# Patient Record
Sex: MALE | Race: WHITE | Employment: OTHER | URBAN - METROPOLITAN AREA
[De-identification: names, ages, dates, MRNs, and addresses within clinical notes are randomized per-mention and may not be internally consistent; named-entity substitution may affect disease eponyms.]

---

## 2018-03-08 ENCOUNTER — HOSPITAL ENCOUNTER (EMERGENCY)
Facility: CLINIC | Age: 73
Discharge: HOME | End: 2018-03-08
Attending: EMERGENCY MEDICINE | Admitting: EMERGENCY MEDICINE
Payer: MEDICARE

## 2018-03-08 ENCOUNTER — APPOINTMENT (OUTPATIENT)
Dept: CT IMAGING | Facility: CLINIC | Age: 73
End: 2018-03-08
Attending: EMERGENCY MEDICINE
Payer: MEDICARE

## 2018-03-08 VITALS
RESPIRATION RATE: 16 BRPM | SYSTOLIC BLOOD PRESSURE: 138 MMHG | OXYGEN SATURATION: 92 % | TEMPERATURE: 98.2 F | WEIGHT: 194 LBS | HEART RATE: 91 BPM | BODY MASS INDEX: 27.77 KG/M2 | DIASTOLIC BLOOD PRESSURE: 88 MMHG | HEIGHT: 70 IN

## 2018-03-08 DIAGNOSIS — S09.90XA CLOSED HEAD INJURY, INITIAL ENCOUNTER: ICD-10-CM

## 2018-03-08 DIAGNOSIS — S01.81XA FACIAL LACERATION, INITIAL ENCOUNTER: ICD-10-CM

## 2018-03-08 PROCEDURE — 99283 EMERGENCY DEPT VISIT LOW MDM: CPT | Mod: 25

## 2018-03-08 PROCEDURE — 70450 CT HEAD/BRAIN W/O DYE: CPT

## 2018-03-08 PROCEDURE — 25000132 ZZH RX MED GY IP 250 OP 250 PS 637

## 2018-03-08 PROCEDURE — 12013 RPR F/E/E/N/L/M 2.6-5.0 CM: CPT

## 2018-03-08 RX ORDER — PANTOPRAZOLE SODIUM 40 MG/1
40 TABLET, DELAYED RELEASE ORAL EVERY EVENING
COMMUNITY
End: 2020-12-03

## 2018-03-08 RX ORDER — ACETAMINOPHEN 325 MG/1
975 TABLET ORAL ONCE
Status: COMPLETED | OUTPATIENT
Start: 2018-03-08 | End: 2018-03-08

## 2018-03-08 RX ORDER — ACETAMINOPHEN 325 MG/1
TABLET ORAL
Status: COMPLETED
Start: 2018-03-08 | End: 2018-03-08

## 2018-03-08 RX ORDER — SIMVASTATIN 80 MG
80 TABLET ORAL EVERY EVENING
COMMUNITY
End: 2021-04-12

## 2018-03-08 RX ORDER — GINSENG 100 MG
CAPSULE ORAL
Status: DISCONTINUED
Start: 2018-03-08 | End: 2018-03-08 | Stop reason: HOSPADM

## 2018-03-08 RX ORDER — LIDOCAINE HYDROCHLORIDE 10 MG/ML
INJECTION, SOLUTION EPIDURAL; INFILTRATION; INTRACAUDAL; PERINEURAL
Status: DISCONTINUED
Start: 2018-03-08 | End: 2018-03-08 | Stop reason: HOSPADM

## 2018-03-08 RX ADMIN — ACETAMINOPHEN 975 MG: 325 TABLET ORAL at 18:11

## 2018-03-08 RX ADMIN — ACETAMINOPHEN 975 MG: 325 TABLET, FILM COATED ORAL at 18:11

## 2018-03-08 NOTE — ED AVS SNAPSHOT
[facility] Emergency Department    [address]    [facility] [address]    Phone:  [phone]    Fax:  [phone]                                       Armen Phelps   MRN: [id number]    Department:  [facility] Emergency Department   Date of Visit:  3/8/2018           Patient Information     Date Of Birth          1945        Your diagnoses for this visit were:     Closed head injury, initial encounter     Facial laceration, initial encounter        You were seen by Armen Lai MD.      Follow-up Information     Follow up with [facility].    Specialties:  Sports Medicine, Pain Management, Obstetrics & Gynecology, Pediatrics, Internal Medicine, Nephrology    Why:  in 5 days for suture removal    Contact information:    [address] [place] [address]  [facility] [address] [phone]        Discharge Instructions       Discharge Instructions  Head Injury    You have been seen today for a head injury. Your evaluation included a history and physical examination. You may have had a CT (CAT) scan performed, though most head injuries do not require a scan. Based on this evaluation, your provider today does not feel that your head injury is serious.    Generally, every Emergency Department visit should have a follow-up clinic visit with either a primary or a specialty clinic/provider. Please follow-up as instructed by your emergency provider today.  Return to the Emergency Department if:    You are confused or you are not acting right.    Your headache gets worse or you start to have a really bad headache even with your recommended treatment plan.    You vomit (throw up) more than once.    You have a seizure.    You have trouble walking.    You have weakness or paralysis (cannot move) in an arm or a leg.    You have blood or fluid coming from your ears or nose.    You have new symptoms or anything that worries you.    Sleeping:   It is okay for you to sleep, but someone should wake you up if instructed by your provider, and someone should check on you at your usual time to wake up.     Activity:    Do not drive for at least 24 hours.    Do not drive if you have dizzy spells or trouble concentrating, or remembering things.    Do not return to any contact sports until cleared by your regular provider.     MORE INFORMATION:    Concussion:  A concussion is a minor head injury that may cause temporary problems with the way the brain works. Although concussions are important, they are generally not an emergency or a reason that a person needs to be hospitalized. Some concussion symptoms include confusion, amnesia (forgetful), nausea (sick to your stomach) and vomiting (throwing up), dizziness, fatigue, memory or concentration problems, irritability and sleep problems. For most people, concussions are mild and temporary but some will have more severe and persistent symptoms that require on-going care and treatment.  CT Scans: Your evaluation today may have included a CT scan (CAT scan) to look for things like bleeding or a skull fracture (broken bone).  CT scans involve radiation and too many CT scans can cause serious health problems like cancer, especially in children.  Because of this, your provider may not have ordered a CT scan today if they think you are at low risk for a serious or life threatening problem.    If you were given a prescription for medicine here today, be sure to read all of the information (including the package insert) that comes with your prescription.  This will include important information about the medicine, its side effects, and any warnings that you need to know about.  The pharmacist who fills the prescription can provide more information and answer questions you may have about the medicine.  If you have questions or concerns that the pharmacist cannot address, please call or return to the Emergency Department.      Remember that you can always come back to the Emergency Department if you are not able to see your regular provider in the amount of time listed above, if you get any new symptoms, or if there is anything that worries you.      Discharge Instructions  Laceration (Cut)    You were seen today for a laceration (cut).  Your provider examined your laceration for any problems such a buried foreign body (like glass, a splinter, or gravel), or injury to blood vessels, tendons, and nerves.  Your provider may have also rinsed and/or scrubbed your laceration to help prevent an infection. It may not be possible to find all problems with your laceration on the first visit; occasionally foreign bodies or a tendon injury can go undetected.    Your laceration may have been closed in one of several ways:    No closure: many wounds will heal just fine without closure.    Stitches: regular stitches that require removal.    Staples: skin staples are often used in the scalp/head.    Wound adhesive (glue): skin glue can be used for certain lacerations and doesn t require removal.    Wound strips (aka Butterfly bandages or steri-strips): these are bandages that help to close a wound.    Absorbable stitches:  dissolving  stitches that go away on their own and usually don t require removal.    A small percentage of wounds will develop an infection regardless of how well the wound is cared for. Antibiotics are generally not indicated to prevent an infection so are only given for a small number of high-risk wounds. Some lacerations are too high risk to close, and are left open to heal because closure can increase the likelihood that an infection will develop.    Remember that all lacerations, no matter how expertly repaired, will cause scarring. We consider many factors, techniques, and materials, in our efforts to provide the best possible cosmetic outcome.    Generally, every Emergency Department visit should have a follow-up clinic  visit with either a primary or a specialty clinic/provider. Please follow-up as instructed by your emergency provider today.     Return to the Emergency Department right away if:    You have more redness, swelling, pain, drainage (pus), a bad smell, or red streaking from your laceration as these symptoms could indicate an infection.    You have a fever of 100.4 F or more.    You have bleeding that you cannot stop at home. If your cut starts to bleed, hold pressure on the bleeding area with a clean cloth or put pressure over the bandage.  If the bleeding does not stop after using constant pressure for 30 minutes, you should return to the Emergency Department for further treatment.    An area past the laceration is cool, pale, or blue compared with the other side, or has a slower return of color when squeezed.    Your dressing seems too tight or starts to get uncomfortable or painful. For children, signs of a problem might be irritability or restlessness.    You have loss of normal function or use of an area, such as being unable to straighten or bend a finger normally.    You have a numb area past the laceration.    Return to the Emergency Department or see your regular provider if:    The laceration starts to come open.     You have something coming out of the cut or a feeling that there is something in the laceration.    Your wound will not heal, or keeps breaking open. There can always be glass, wood, dirt or other things in any wound.  They will not always show up, even on x-rays.  If a wound does not heal, this may be why, and it is important to follow-up with your regular provider.    Home Care:    Take your dressing off in 12-24 hours, or as instructed by your provider, to check your laceration. Remove the dressing sooner if it seems too tight or painful, or if it is getting numb, tingly, or pale past the dressing.    Gently wash your laceration 1-2 times daily with clean water and mild soap. It is okay to  shower or run clean water over the laceration, but do not let the laceration soak in water (no swimming).    If your laceration was closed with wound adhesive or strips: pat it dry and leave it open to the air. For all other repairs: after you wash your laceration, or at least 2 times a day, apply antibiotic ointment (such as Neosporin  or Bacitracin ) to the laceration, then cover it with a Band-Aid  or gauze.    Keep the laceration clean. Wear gloves or other protective clothing if you are around dirt.    Follow-up for removal:    If your wound was closed with staples or regular stitches, they need to be removed according to the instructions and timeline specified by your provider today.    If your wound was closed with absorbable ( dissolving ) sutures, they should fall out, dissolve, or not be visible in about one week. If they are still visible, then they should be removed according to the instructions and timeline specified by your provider today.    Scars:  To help minimize scarring:    Wear sunscreen over the healed laceration when out in the sun.    Massage the area regularly once healed.    You may apply Vitamin E to the healed wound.    Wait. Scars improve in appearance over months and years.    If you were given a prescription for medicine here today, be sure to read all of the information (including the package insert) that comes with your prescription.  This will include important information about the medicine, its side effects, and any warnings that you need to know about.  The pharmacist who fills the prescription can provide more information and answer questions you may have about the medicine.  If you have questions or concerns that the pharmacist cannot address, please call or return to the Emergency Department.       Remember that you can always come back to the Emergency Department if you are not able to see your regular provider in the amount of time listed above, if you get any new symptoms,  or if there is anything that worries you.      24 Hour Appointment Hotline       To make an appointment at any [facility], call [phone] ([phone]). If you don't have a family doctor or clinic, we will help you find one. [place] clinics are conveniently located to serve the needs of you and your family.             Review of your medicines      Our records show that you are taking the medicines listed below. If these are incorrect, please call your family doctor or clinic.        Dose / Directions Last dose taken    ASPIRIN ADULT LOW STRENGTH PO   Dose:  81 mg        Take 81 mg by mouth   Refills:  0        LISINOPRIL PO   Dose:  20 mg        Take 20 mg by mouth   Refills:  0        PROTONIX PO   Dose:  40 mg        Take 40 mg by mouth   Refills:  0        SIMVASTATIN PO        Refills:  0                Procedures and tests performed during your visit     CT Head w/o Contrast      Orders Needing Specimen Collection     None      Pending Results     No orders found from 3/6/2018 to 3/9/2018.            Pending Culture Results     No orders found from 3/6/2018 to 3/9/2018.            Pending Results Instructions     If you had any lab results that were not finalized at the time of your Discharge, you can call the ED Lab Result RN at [phone]. You will be contacted by this team for any positive Lab results or changes in treatment. The nurses are available 7 days a week from 10A to 6:30P.  You can leave a message 24 hours per day and they will return your call.        Test Results From Your Hospital Stay        3/8/2018  6:48 PM      Narrative     CT SCAN OF THE HEAD WITHOUT CONTRAST   3/8/2018 6:11 PM     HISTORY: Closed head injury.    TECHNIQUE: Axial images of the head and coronal reformations without  IV contrast material. Radiation dose for this scan was reduced using  automated exposure control, adjustment of the mA and/or kV according  to patient size, or iterative reconstruction  technique.    COMPARISON: None.    FINDINGS: There is no evidence of intracranial hemorrhage, mass, acute  infarct or anomaly. The ventricles are normal in size, shape and  configuration. Mild diffuse parenchymal volume loss.    The visualized portions of the sinuses and mastoids appear normal.  Left supraorbital soft tissue injury without underlying fracture.        Impression     IMPRESSION:     1. No evidence of acute intracranial hemorrhage, mass, or herniation.  2. Left supraorbital soft tissue injury without underlying fracture.      KIMANI AMIN MD                Clinical Quality Measure: Blood Pressure Screening     Your blood pressure was checked while you were in the emergency department today. The last reading we obtained was  BP: 138/88 . Please read the guidelines below about what these numbers mean and what you should do about them.  If your systolic blood pressure (the top number) is less than 120 and your diastolic blood pressure (the bottom number) is less than 80, then your blood pressure is normal. There is nothing more that you need to do about it.  If your systolic blood pressure (the top number) is 120-139 or your diastolic blood pressure (the bottom number) is 80-89, your blood pressure may be higher than it should be. You should have your blood pressure rechecked within a year by a primary care provider.  If your systolic blood pressure (the top number) is 140 or greater or your diastolic blood pressure (the bottom number) is 90 or greater, you may have high blood pressure. High blood pressure is treatable, but if left untreated over time it can put you at risk for heart attack, stroke, or kidney failure. You should have your blood pressure rechecked by a primary care provider within the next 4 weeks.  If your provider in the emergency department today gave you specific instructions to follow-up with your doctor or provider even sooner than that, you should follow that instruction and not  "wait for up to 4 weeks for your follow-up visit.        Thank you for choosing [facility]       Thank you for choosing [facility] for your care. Our goal is always to provide you with excellent care. Hearing back from our patients is one way we can continue to improve our services. Please take a few minutes to complete the written survey that you may receive in the mail after you visit with us. Thank you!        [employer] Information     [employer] lets you send messages to your doctor, view your test results, renew your prescriptions, schedule appointments and more. To sign up, go to [email or web address] . Click on \"Log in\" on the left side of the screen, which will take you to the Welcome page. Then click on \"Sign up Now\" on the right side of the page.     You will be asked to enter the access code listed below, as well as some personal information. Please follow the directions to create your username and password.     Your access code is: [email or web address]  Expires: 2018  7:34 PM     Your access code will  in 90 days. If you need help or a new code, please call your [facility] clinic or [phone].        Care EveryWhere ID     This is your Care EveryWhere ID. This could be used by other organizations to access your [facility] medical records  [id number]        Equal Access to Services     ROXANNA JAMES AH: Martinez minaya Sosilvino, waaxda luqadaha, qaybta kaalmada adeluda, cory buchanan. So [facility] [phone].    ATENCIÓN: Si habla español, tiene a guevara disposición servicios gratuitos de asistencia lingüística. Llame al [phone].    We comply with applicable federal civil rights laws and [place] laws. We do not discriminate on the basis of race, color, national origin, age, disability, sex, sexual orientation, or gender identity.            After Visit Summary       This is your record. Keep this with you and show to your community pharmacist(s) and doctor(s) at your next visit. "

## 2018-03-08 NOTE — ED PROVIDER NOTES
"  History     Chief Complaint:  Fall and head laceration    HPI   Armen Phelps is a 73 year old male who presents following a fall, with a head laceration. He notes he was walking his dog earlier today and tripped, falling forward. Because he was holding his dogs' leashes, he did not catch him self with both hands, and struck his forehead and nose to the ground. He denies LOC, nausea/vomiting, dizziness, or any other concerns.    Allergies:  No Known Drug Allergies      Medications:    Simvastatin  Lisinopril  Protonix    Past Medical History:    Sciatica  Thoracic or lumbosacral neuritis or radiculitis  Hypertension     Past Surgical History:    Back surgery  Cholecystectomy     Family History:    The patient denies any relevant family medical history.     Social History:  Smoking Status: Former  Alcohol Use: Yes   Marital Status:   [2]    Review of Systems  Neuro: + as per above.  All other systems reviewed and negative      Physical Exam   Vitals:  Patient Vitals for the past 24 hrs:   BP Temp Temp src Pulse Heart Rate Resp SpO2 Height Weight   03/08/18 1745 - - - - - - 92 % - -   03/08/18 1730 138/88 - - 91 91 - 93 % - -   03/08/18 1708 (!) 132/91 98.2  F (36.8  C) Oral - 98 16 97 % 1.778 m (5' 10\") 88 kg (194 lb)        Physical Exam  Constitutional: Alert, attentive, GCS 15  HENT:     Nose: Nose notable for abrasions to the bridge and left nare   Mouth/Throat: Oropharynx is clear, mucous membranes are moist   Ears: Normal external ears. TMs clear bilaterally, normal external canals    bilaterally.  Eyes: EOM are normal. Pupils are equal, round, and reactive to light.   CV: Regular rate and rhythm, no murmurs, rubs or gallups.  Chest: Effort normal and breath sounds normal.   GI: No distension. There is no tenderness.  MSK: Normal range of motion.    C-spine cleared by NEXUS   No tenderness or stepoffs to the C/T/L-spine   Normal, atraumatic inspection to the back  Neurological: Alert, attentive  Skin: " Skin is warm and dry.   Laceration to the left forehead just above the eyebrow, with surrounding abrasions    Emergency Department Course     Imaging:  Radiology findings were communicated with the patient who voiced understanding of the findings.  CT head w/o contrast:  IMPRESSION:     1. No evidence of acute intracranial hemorrhage, mass, or herniation.  2. Left supraorbital soft tissue injury without underlying fracture.  Reading per radiology.     Procedures:  Laceration repaired by ANDRZEJ Becerril with excellent approximation.    Interventions:  1811 Tylenol 975 mg oral    Emergency Department Course:  Nursing notes and vitals reviewed.  I performed an exam of the patient as documented above.   The patient was sent for a CT while in the emergency department, results above.      I discussed the treatment plan with the patient. They expressed understanding of this plan and consented to discharge. They will be discharged home with instructions for care and follow up. In addition, the patient will return to the emergency department if their symptoms persist, worsen, if new symptoms arise or if there is any concern.  All questions were answered.     I personally reviewed the laboratory results with the patient and answered all related questions prior to discharge.    Impression & Plan      Medical Decision Making:  This is a very pleasant 73 year old male who presents with closed head injury and facial laceration after a mechanical fall. Given advanced age, CT was performed to rule out intracranial injury and is negative. C-spine is cleared by NEXUS. He is UTD tetanus. Laceration repair was performed with excellent approximation, plan SR in 5 days. Discussed his CHI and that he would need to return for any worsening headache, vomiting, confusion, etc., as these could indicate delayed bleed.      Diagnosis:    ICD-10-CM    1. Closed head injury, initial encounter S09.90XA    2. Facial laceration, initial encounter  S01.81XA         Disposition:   Discharged    CMS Diagnoses: None     Scribe Disclosure:  I, Cam Horta, am serving as a scribe at 5:16 PM on 3/8/2018 to document services personally performed by Armen Lai MD, based on my observations and the provider's statements to me.   [facility] EMERGENCY DEPARTMENT       Armen Lai MD  03/08/18 7600

## 2018-03-08 NOTE — ED AVS SNAPSHOT
[facility] Emergency Department    [address]    [facility] [address]    Phone:  [phone]    Fax:  [phone]                                       Armen Phelps   MRN: [id number]    Department:  [facility] Emergency Department   Date of Visit:  3/8/2018           After Visit Summary Signature Page     I have received my discharge instructions, and my questions have been answered. I have discussed any challenges I see with this plan with the nurse or doctor.    ..........................................................................................................................................  Patient/Patient Representative Signature      ..........................................................................................................................................  Patient Representative Print Name and Relationship to Patient    ..................................................               ................................................  Date                                            Time    ..........................................................................................................................................  Reviewed by Signature/Title    ...................................................              ..............................................  Date                                                            Time

## 2018-03-08 NOTE — ED NOTES
Pt was walking his dogs and fell on ice.  He struck forehead onto pavement.  He thinks he did not lose consciousness.  He has laceration to left forehead and abrasions to face and nose.

## 2018-03-09 NOTE — DISCHARGE INSTRUCTIONS
Discharge Instructions  Head Injury    You have been seen today for a head injury. Your evaluation included a history and physical examination. You may have had a CT (CAT) scan performed, though most head injuries do not require a scan. Based on this evaluation, your provider today does not feel that your head injury is serious.    Generally, every Emergency Department visit should have a follow-up clinic visit with either a primary or a specialty clinic/provider. Please follow-up as instructed by your emergency provider today.  Return to the Emergency Department if:    You are confused or you are not acting right.    Your headache gets worse or you start to have a really bad headache even with your recommended treatment plan.    You vomit (throw up) more than once.    You have a seizure.    You have trouble walking.    You have weakness or paralysis (cannot move) in an arm or a leg.    You have blood or fluid coming from your ears or nose.    You have new symptoms or anything that worries you.    Sleeping:  It is okay for you to sleep, but someone should wake you up if instructed by your provider, and someone should check on you at your usual time to wake up.     Activity:    Do not drive for at least 24 hours.    Do not drive if you have dizzy spells or trouble concentrating, or remembering things.    Do not return to any contact sports until cleared by your regular provider.     MORE INFORMATION:    Concussion:  A concussion is a minor head injury that may cause temporary problems with the way the brain works. Although concussions are important, they are generally not an emergency or a reason that a person needs to be hospitalized. Some concussion symptoms include confusion, amnesia (forgetful), nausea (sick to your stomach) and vomiting (throwing up), dizziness, fatigue, memory or concentration problems, irritability and sleep problems. For most people, concussions are mild and temporary but some will have more  severe and persistent symptoms that require on-going care and treatment.  CT Scans: Your evaluation today may have included a CT scan (CAT scan) to look for things like bleeding or a skull fracture (broken bone).  CT scans involve radiation and too many CT scans can cause serious health problems like cancer, especially in children.  Because of this, your provider may not have ordered a CT scan today if they think you are at low risk for a serious or life threatening problem.    If you were given a prescription for medicine here today, be sure to read all of the information (including the package insert) that comes with your prescription.  This will include important information about the medicine, its side effects, and any warnings that you need to know about.  The pharmacist who fills the prescription can provide more information and answer questions you may have about the medicine.  If you have questions or concerns that the pharmacist cannot address, please call or return to the Emergency Department.     Remember that you can always come back to the Emergency Department if you are not able to see your regular provider in the amount of time listed above, if you get any new symptoms, or if there is anything that worries you.      Discharge Instructions  Laceration (Cut)    You were seen today for a laceration (cut).  Your provider examined your laceration for any problems such a buried foreign body (like glass, a splinter, or gravel), or injury to blood vessels, tendons, and nerves.  Your provider may have also rinsed and/or scrubbed your laceration to help prevent an infection. It may not be possible to find all problems with your laceration on the first visit; occasionally foreign bodies or a tendon injury can go undetected.    Your laceration may have been closed in one of several ways:    No closure: many wounds will heal just fine without closure.    Stitches: regular stitches that require removal.    Staples:  skin staples are often used in the scalp/head.    Wound adhesive (glue): skin glue can be used for certain lacerations and doesn t require removal.    Wound strips (aka Butterfly bandages or steri-strips): these are bandages that help to close a wound.    Absorbable stitches:  dissolving  stitches that go away on their own and usually don t require removal.    A small percentage of wounds will develop an infection regardless of how well the wound is cared for. Antibiotics are generally not indicated to prevent an infection so are only given for a small number of high-risk wounds. Some lacerations are too high risk to close, and are left open to heal because closure can increase the likelihood that an infection will develop.    Remember that all lacerations, no matter how expertly repaired, will cause scarring. We consider many factors, techniques, and materials, in our efforts to provide the best possible cosmetic outcome.    Generally, every Emergency Department visit should have a follow-up clinic visit with either a primary or a specialty clinic/provider. Please follow-up as instructed by your emergency provider today.     Return to the Emergency Department right away if:    You have more redness, swelling, pain, drainage (pus), a bad smell, or red streaking from your laceration as these symptoms could indicate an infection.    You have a fever of 100.4 F or more.    You have bleeding that you cannot stop at home. If your cut starts to bleed, hold pressure on the bleeding area with a clean cloth or put pressure over the bandage.  If the bleeding does not stop after using constant pressure for 30 minutes, you should return to the Emergency Department for further treatment.    An area past the laceration is cool, pale, or blue compared with the other side, or has a slower return of color when squeezed.    Your dressing seems too tight or starts to get uncomfortable or painful. For children, signs of a problem might  be irritability or restlessness.    You have loss of normal function or use of an area, such as being unable to straighten or bend a finger normally.    You have a numb area past the laceration.    Return to the Emergency Department or see your regular provider if:    The laceration starts to come open.     You have something coming out of the cut or a feeling that there is something in the laceration.    Your wound will not heal, or keeps breaking open. There can always be glass, wood, dirt or other things in any wound.  They will not always show up, even on x-rays.  If a wound does not heal, this may be why, and it is important to follow-up with your regular provider.    Home Care:    Take your dressing off in 12-24 hours, or as instructed by your provider, to check your laceration. Remove the dressing sooner if it seems too tight or painful, or if it is getting numb, tingly, or pale past the dressing.    Gently wash your laceration 1-2 times daily with clean water and mild soap. It is okay to shower or run clean water over the laceration, but do not let the laceration soak in water (no swimming).    If your laceration was closed with wound adhesive or strips: pat it dry and leave it open to the air. For all other repairs: after you wash your laceration, or at least 2 times a day, apply antibiotic ointment (such as Neosporin  or Bacitracin ) to the laceration, then cover it with a Band-Aid  or gauze.    Keep the laceration clean. Wear gloves or other protective clothing if you are around dirt.    Follow-up for removal:    If your wound was closed with staples or regular stitches, they need to be removed according to the instructions and timeline specified by your provider today.    If your wound was closed with absorbable ( dissolving ) sutures, they should fall out, dissolve, or not be visible in about one week. If they are still visible, then they should be removed according to the instructions and timeline  specified by your provider today.    Scars:  To help minimize scarring:    Wear sunscreen over the healed laceration when out in the sun.    Massage the area regularly once healed.    You may apply Vitamin E to the healed wound.    Wait. Scars improve in appearance over months and years.    If you were given a prescription for medicine here today, be sure to read all of the information (including the package insert) that comes with your prescription.  This will include important information about the medicine, its side effects, and any warnings that you need to know about.  The pharmacist who fills the prescription can provide more information and answer questions you may have about the medicine.  If you have questions or concerns that the pharmacist cannot address, please call or return to the Emergency Department.       Remember that you can always come back to the Emergency Department if you are not able to see your regular provider in the amount of time listed above, if you get any new symptoms, or if there is anything that worries you.

## 2018-03-09 NOTE — ED NOTES
Narrative: Procedure: Laceration Repair        LACERATION:  A simple minimally Contaminated 3.5 cm laceration with   surrounding abrasions.       LOCATION: Above left eyebrow      FUNCTION:  Distally sensation and circulation are intact.      ANESTHESIA:  Local using 5 mLs of 1% Lidocaine      PREPARATION:  Scrubbing with Normal Saline and Shur Clens      DEBRIDEMENT:  No debridement.       CLOSURE:  Wound was closed with One Layer. Skin closed with 7 x 5.0   Ethylon using interrupted sutures.    I performed the above laceration repair on the patient. Dr. Lai supervised the procedure.      Diana Becerril PA-C  03/09/18 [id number]

## 2018-10-20 ENCOUNTER — APPOINTMENT (OUTPATIENT)
Dept: CT IMAGING | Facility: CLINIC | Age: 73
End: 2018-10-20
Attending: EMERGENCY MEDICINE
Payer: MEDICARE

## 2018-10-20 ENCOUNTER — HOSPITAL ENCOUNTER (EMERGENCY)
Facility: CLINIC | Age: 73
Discharge: HOME | End: 2018-10-20
Attending: EMERGENCY MEDICINE | Admitting: EMERGENCY MEDICINE
Payer: MEDICARE

## 2018-10-20 VITALS
OXYGEN SATURATION: 96 % | HEART RATE: 98 BPM | SYSTOLIC BLOOD PRESSURE: 154 MMHG | WEIGHT: 185 LBS | RESPIRATION RATE: 20 BRPM | TEMPERATURE: 98 F | BODY MASS INDEX: 26.54 KG/M2 | DIASTOLIC BLOOD PRESSURE: 85 MMHG

## 2018-10-20 DIAGNOSIS — R11.2 NAUSEA AND VOMITING, INTRACTABILITY OF VOMITING NOT SPECIFIED, UNSPECIFIED VOMITING TYPE: ICD-10-CM

## 2018-10-20 LAB
ALBUMIN SERPL-MCNC: 4.4 G/DL (ref 3.4–5)
ALP SERPL-CCNC: 61 U/L (ref 40–150)
ALT SERPL W P-5'-P-CCNC: 27 U/L (ref 0–70)
ANION GAP SERPL CALCULATED.3IONS-SCNC: 14 MMOL/L (ref 3–14)
AST SERPL W P-5'-P-CCNC: 42 U/L (ref 0–45)
BASOPHILS # BLD AUTO: 0 10E9/L (ref 0–0.2)
BASOPHILS NFR BLD AUTO: 0.2 %
BILIRUB SERPL-MCNC: 0.9 MG/DL (ref 0.2–1.3)
BUN SERPL-MCNC: 17 MG/DL (ref 7–30)
CALCIUM SERPL-MCNC: 9 MG/DL (ref 8.5–10.1)
CHLORIDE SERPL-SCNC: 99 MMOL/L (ref 94–109)
CO2 SERPL-SCNC: 23 MMOL/L (ref 20–32)
CREAT BLD-MCNC: 0.9 MG/DL (ref 0.66–1.25)
CREAT SERPL-MCNC: 0.92 MG/DL (ref 0.66–1.25)
DIFFERENTIAL METHOD BLD: ABNORMAL
EOSINOPHIL # BLD AUTO: 0 10E9/L (ref 0–0.7)
EOSINOPHIL NFR BLD AUTO: 0 %
ERYTHROCYTE [DISTWIDTH] IN BLOOD BY AUTOMATED COUNT: 14.2 % (ref 10–15)
ETHANOL SERPL-MCNC: <0.01 G/DL
GFR SERPL CREATININE-BSD FRML MDRD: 81 ML/MIN/1.7M2
GFR SERPL CREATININE-BSD FRML MDRD: 83 ML/MIN/1.7M2
GLUCOSE SERPL-MCNC: 139 MG/DL (ref 70–99)
HCT VFR BLD AUTO: 35.1 % (ref 40–53)
HGB BLD-MCNC: 11.7 G/DL (ref 13.3–17.7)
IMM GRANULOCYTES # BLD: 0 10E9/L (ref 0–0.4)
IMM GRANULOCYTES NFR BLD: 0.5 %
LIPASE SERPL-CCNC: 135 U/L (ref 73–393)
LYMPHOCYTES # BLD AUTO: 0.2 10E9/L (ref 0.8–5.3)
LYMPHOCYTES NFR BLD AUTO: 5.1 %
MCH RBC QN AUTO: 35 PG (ref 26.5–33)
MCHC RBC AUTO-ENTMCNC: 33.3 G/DL (ref 31.5–36.5)
MCV RBC AUTO: 105 FL (ref 78–100)
MONOCYTES # BLD AUTO: 0.2 10E9/L (ref 0–1.3)
MONOCYTES NFR BLD AUTO: 4.6 %
NEUTROPHILS # BLD AUTO: 3.7 10E9/L (ref 1.6–8.3)
NEUTROPHILS NFR BLD AUTO: 89.6 %
NRBC # BLD AUTO: 0 10*3/UL
NRBC BLD AUTO-RTO: 0 /100
PLATELET # BLD AUTO: 140 10E9/L (ref 150–450)
POTASSIUM SERPL-SCNC: 3.4 MMOL/L (ref 3.4–5.3)
PROT SERPL-MCNC: 7.4 G/DL (ref 6.8–8.8)
RBC # BLD AUTO: 3.34 10E12/L (ref 4.4–5.9)
SODIUM SERPL-SCNC: 136 MMOL/L (ref 133–144)
TROPONIN I SERPL-MCNC: <0.015 UG/L (ref 0–0.04)
WBC # BLD AUTO: 4.1 10E9/L (ref 4–11)

## 2018-10-20 PROCEDURE — 83690 ASSAY OF LIPASE: CPT | Performed by: EMERGENCY MEDICINE

## 2018-10-20 PROCEDURE — 25000128 H RX IP 250 OP 636: Performed by: EMERGENCY MEDICINE

## 2018-10-20 PROCEDURE — 82565 ASSAY OF CREATININE: CPT

## 2018-10-20 PROCEDURE — 80053 COMPREHEN METABOLIC PANEL: CPT | Performed by: EMERGENCY MEDICINE

## 2018-10-20 PROCEDURE — 96374 THER/PROPH/DIAG INJ IV PUSH: CPT | Mod: 59

## 2018-10-20 PROCEDURE — 36415 COLL VENOUS BLD VENIPUNCTURE: CPT | Performed by: EMERGENCY MEDICINE

## 2018-10-20 PROCEDURE — 25000125 ZZHC RX 250: Performed by: EMERGENCY MEDICINE

## 2018-10-20 PROCEDURE — 74177 CT ABD & PELVIS W/CONTRAST: CPT

## 2018-10-20 PROCEDURE — 96375 TX/PRO/DX INJ NEW DRUG ADDON: CPT

## 2018-10-20 PROCEDURE — 85025 COMPLETE CBC W/AUTO DIFF WBC: CPT | Performed by: EMERGENCY MEDICINE

## 2018-10-20 PROCEDURE — 99285 EMERGENCY DEPT VISIT HI MDM: CPT | Mod: 25

## 2018-10-20 PROCEDURE — 80320 DRUG SCREEN QUANTALCOHOLS: CPT | Performed by: EMERGENCY MEDICINE

## 2018-10-20 PROCEDURE — 25000132 ZZH RX MED GY IP 250 OP 250 PS 637: Mod: GY | Performed by: EMERGENCY MEDICINE

## 2018-10-20 PROCEDURE — 96361 HYDRATE IV INFUSION ADD-ON: CPT

## 2018-10-20 PROCEDURE — 84484 ASSAY OF TROPONIN QUANT: CPT | Performed by: EMERGENCY MEDICINE

## 2018-10-20 PROCEDURE — A9270 NON-COVERED ITEM OR SERVICE: HCPCS | Mod: GY | Performed by: EMERGENCY MEDICINE

## 2018-10-20 RX ORDER — ONDANSETRON 2 MG/ML
4 INJECTION INTRAMUSCULAR; INTRAVENOUS ONCE
Status: COMPLETED | OUTPATIENT
Start: 2018-10-20 | End: 2018-10-20

## 2018-10-20 RX ORDER — PROMETHAZINE HYDROCHLORIDE 25 MG/1
25 SUPPOSITORY RECTAL EVERY 6 HOURS PRN
Qty: 20 SUPPOSITORY | Refills: 1 | Status: SHIPPED | OUTPATIENT
Start: 2018-10-20 | End: 2019-07-10

## 2018-10-20 RX ORDER — DIPHENHYDRAMINE HYDROCHLORIDE 50 MG/ML
25 INJECTION INTRAMUSCULAR; INTRAVENOUS
Status: COMPLETED | OUTPATIENT
Start: 2018-10-20 | End: 2018-10-20

## 2018-10-20 RX ORDER — METOCLOPRAMIDE HYDROCHLORIDE 5 MG/ML
5 INJECTION INTRAMUSCULAR; INTRAVENOUS
Status: COMPLETED | OUTPATIENT
Start: 2018-10-20 | End: 2018-10-20

## 2018-10-20 RX ORDER — IOPAMIDOL 755 MG/ML
500 INJECTION, SOLUTION INTRAVASCULAR ONCE
Status: COMPLETED | OUTPATIENT
Start: 2018-10-20 | End: 2018-10-20

## 2018-10-20 RX ORDER — PROMETHAZINE HYDROCHLORIDE 25 MG/1
25 TABLET ORAL EVERY 6 HOURS PRN
Qty: 15 TABLET | Refills: 0 | Status: SHIPPED | OUTPATIENT
Start: 2018-10-20 | End: 2019-07-10

## 2018-10-20 RX ADMIN — METOCLOPRAMIDE 5 MG: 5 INJECTION, SOLUTION INTRAMUSCULAR; INTRAVENOUS at 16:08

## 2018-10-20 RX ADMIN — DIPHENHYDRAMINE HYDROCHLORIDE 25 MG: 50 INJECTION, SOLUTION INTRAMUSCULAR; INTRAVENOUS at 16:08

## 2018-10-20 RX ADMIN — LIDOCAINE HYDROCHLORIDE 30 ML: 20 SOLUTION ORAL; TOPICAL at 16:56

## 2018-10-20 RX ADMIN — SODIUM CHLORIDE, POTASSIUM CHLORIDE, SODIUM LACTATE AND CALCIUM CHLORIDE 1000 ML: 600; 310; 30; 20 INJECTION, SOLUTION INTRAVENOUS at 16:07

## 2018-10-20 RX ADMIN — SODIUM CHLORIDE 63 ML: 9 INJECTION, SOLUTION INTRAVENOUS at 16:36

## 2018-10-20 RX ADMIN — ONDANSETRON 4 MG: 2 INJECTION INTRAMUSCULAR; INTRAVENOUS at 16:08

## 2018-10-20 RX ADMIN — IOPAMIDOL 93 ML: 755 INJECTION, SOLUTION INTRAVENOUS at 16:36

## 2018-10-20 ASSESSMENT — ENCOUNTER SYMPTOMS
TREMORS: 1
TROUBLE SWALLOWING: 0
DYSURIA: 0
VOMITING: 1
HEADACHES: 1
DIARRHEA: 0
SPEECH DIFFICULTY: 0
NAUSEA: 1
SHORTNESS OF BREATH: 0

## 2018-10-20 NOTE — ED PROVIDER NOTES
Brief visit to initiate the workup near shift change    73-year-old gentleman who has had acute onset of nausea nonbilious nonbloody emesis once a month for the last several months going back to March (7 months).  No recent travel antibiotics or known sick contacts.  Woke up today just not feeling right and then developed nausea multiple episodes of emesis again nonbilious and nonbloody.  He does not have diarrhea dysuria.  Abdominal cramping with the vomiting otherwise has just a feeling of distention.  No fever no intra-abdominal surgery.  No known heart or lung problems other than hypertension.  Was seen by Dr. Barreto blood work was done and was unremarkable and told he had acid reflux.    Ordered: ECG, CBC, CMP, lipase, CT scan abdomen pelvis, IV fluids, antiemetics    Concerns here would be for a abdominal anatomical/functional etiology versus referred cardiac etiology unlikely a referred CNS pathology given the history and examination.    Ronald Vázquez MD  [facility]  [facility]     Ronald Vázquez MD  10/20/18 [id number]

## 2018-10-20 NOTE — ED TRIAGE NOTES
Pt states intermittent vomiting for over 3 months, states has been seen by PCP for same without definitive diagnosis or relief. States had episode of palpitations immediately after vomiting, denies palpitations currently. States has not had any imaging done for vomiting and has not seen a specialist for same. ABCs intact, GCS 15

## 2018-10-20 NOTE — ED PROVIDER NOTES
History     Chief Complaint:  Vomiting    HPI   Armen Phelps is a 73 year old male status post cholecystectomy who presents with vomiting. The patient reports to having monthly day long emesis episodes since March where he feels nauseas before onset.  For these, after onset he has had symptoms of tremors and headaches, and by spouse nighttime confusion. He has presented to his family physician at [facility] and was told these symptoms could be attributed to acid reflex. He further reports that today he woke up at 0600 hours with vomiting, had breakfast and vomited that up shortly after, and then vomited three more times with unusually violent tremors, prompting his presentation by spouse.  Upon presentation, he states that he has no headaches and no pain.  He denies chest pain, shortness of breath, diarrhea, black stool, and hematemesis, as well as a history of heart attack and angina. He also denies difficulty walking and talking or swallowing, as well as double vision, vertigo, and dysuria. He further denies coffee or nicotine intake. Per spouse account, he endorsed drinking almost a bottle of wine per day and last intake was last night. Of note, he mentioned that he took Zofran just prior to presentation.     Allergies:  No known drug allergies    Medications:    ASPIRIN ADULT LOW STRENGTH PO  LISINOPRIL PO  Pantoprazole Sodium (PROTONIX PO)  SIMVASTATIN PO    Past Medical History:    Hypertension  Sciatica  Thoracic or lumbosacral neuritis or radiculitis, unspecified    Past Surgical History:    Back Surgery  Cholecystectomy    Family History:    History reviewed. No pertinent family history.     Social History:  Marital Status:     Tobacco Status: Former Smoker; Never Used Smokeless   Alcohol Use: Almost a bottle of wine daily  Presents: By Spouse         Review of Systems   HENT: Negative for trouble swallowing.    Eyes: Negative for visual disturbance.   Respiratory: Negative for  shortness of breath.    Cardiovascular: Negative for chest pain.   Gastrointestinal: Positive for nausea and vomiting. Negative for diarrhea.        Negative: Black Stool; Hematemesis   Genitourinary: Negative for dysuria.   Musculoskeletal: Negative for gait problem.   Neurological: Positive for tremors and headaches. Negative for speech difficulty.   All other systems reviewed and are negative.    Physical Exam     Patient Vitals for the past 24 hrs:   BP Temp Temp src Pulse Resp SpO2 Weight   10/20/18 1830 154/85 - - - - 96 % -   10/20/18 1815 - - - - - 97 % -   10/20/18 1800 (!) 161/98 - - - - 95 % -   10/20/18 1745 - - - - - 95 % -   10/20/18 1730 (!) 141/93 - - - - 94 % -   10/20/18 1700 140/88 - - - - 94 % -   10/20/18 1600 163/86 - - - - 99 % -   10/20/18 1545 - - - - - 98 % -   10/20/18 1534 (!) 160/114 98  F (36.7  C) Oral 98 20 96 % 83.9 kg (185 lb)       Physical Exam  General: Anxious. Lying comfortably.  HEENT:   The scalp and head appear normal    The pupils are equal, round, and reactive to light    Extraocular muscles are intact.    The nose is normal.    The oropharynx is normal.      Uvula is in the midline.    Neck:  Normal range of motion.    Lungs:  Clear.      No rales, no wheezing.      There is no tachypnea.  Non-labored.  Cardiac: Regular rate.      Normal S1 and S2.      No pathological murmur/rub    Abdomen: Soft. No distension, no localized tenderness or rebound.  MS:  Normal tone. Normal movement of all extremities.   Neurologic:     Normal mentation.  No cranial nerve deficits.  No focal motor or sensory                              changes.  A little tremulous.    Speech normal.  Psych:  Awake.     Alert.      Normal affect.      Appropriate interactions.  Skin:  No rash.      No lesions.     Emergency Department Course   ECG:  ECG (15:23:29):  Rate 89 bpm. ME interval 154. QRS duration 86. QT/QTc 362/440. P-R-T axes 50  23  25. Interpretation: Normal Sinus Rhythm, Normal ECG.  Interpreted at 1530 by Ronald Vázquez MD on 10/20/2018.    Imaging:  Radiographic findings were communicated with the patient and spouse who voiced understanding of the findings.  CT-scan Abdomen/Pelvis, IV Contrast Only TRAUMA / AAA:  IMPRESSION: No acute abnormality in the abdomen or pelvis. No cause  for abdominal pain or vomiting is identified. No bowel obstruction.  Preliminary result per radiology.     Laboratory:  CBC: RBC 3.34 (L), HGB 11.7 (L), HCT 35.1 (L),  (H), MCH 35.0 (H),  (L), Absolute Lymphocytes 0.2 (L) o/w WNL (WBC 4.1)  CMP: Glucose 139 (H) o/w WNL (Creatinine 0.92)  Lipase: 135  1609: Troponin: <0.015  Alcohol Ethyl: <0.01  Creatinine POCT: Creatinine 0.9 (N), GFR 83 (N)    Interventions:  1607: Lactated Ringers Bolus 1000 ml IV  1608: Zofran 4 mg IV Injection   1608: Reglan 5 mg IV Injection  1608: Benadryl 25 mg IV Injection  1656: GI Cocktail - Maalox 15 mL, Viscous Lidocaine 15 mL, 30 mL suspension PO    Emergency Department Course:  Past medical records, nursing notes, and vitals reviewed.  1540: I performed an exam of the patient and obtained history, as documented above.  IV inserted and blood drawn. The patient was placed on continuous cardiac monitoring and pulse oximetry.  The patient was sent for a Abdomen/Pelvis CT-Scan while in the emergency department, findings above.  1807: I rechecked the patient.   1817: Findings and plan explained to the Patient and spouse. Patient discharged home with instructions regarding supportive care, medications, and reasons to return. The importance of close follow-up was reviewed. The patient was prescribed Promethazine.     Impression & Plan    Medical Decision Making:  Armen Phelps is a 73 year old male who comes in with several months history of recurrent vomiting that happens episodically.  He denies that there has been any bloody vomit.  Evaluation here reveals a comfortable appearing 73-year-old, and had no more vomiting here after  Zofran and fluids.   Reglan and Benadryl that was given to him took away all of his nausea, and he had no more vomiting.  He drank fluids here to go with that.  CT-Scan of the abdomen was included an aorta survey was negative, completely normal.  All of his labs are normal.  He does not have an elevated white count, his vital signs are normal.    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  He does not have any signs of pancreatitis, although I believe that he has to cut back on the amount of wine that he is drinking.  It sounds like, according to his wife, he can drink up to 2 normal bottles of wine per night.  That being said, he needs to continue his pantoprazole, use the anti-emetics as directed, follow-up with [facility].  I did put in a consult for him regarding them.    Diagnosis:    ICD-10-CM    1. Nausea and vomiting, intractability of vomiting not specified, unspecified vomiting type R11.2 GASTROENTEROLOGY ADULT REF CONSULT ONLY       Disposition:  discharged to home    Discharge Medications:   Details   promethazine (PHENERGAN) 25 MG Suppository Place 1 suppository (25 mg) rectally every 6 hours as needed for nausea, Disp-20 suppository, R-1, Local Print      promethazine (PHENERGAN) 25 MG tablet Take 1 tablet (25 mg) by mouth every 6 hours as needed for nausea, Disp-15 tablet, R-0, Local Print          Bennie Greene  10/20/2018   [facility] EMERGENCY DEPARTMENT  I, Bennie Greene, am serving as a scribe at 3:40 PM on 10/20/2018 to document services personally performed by Jeff Bautista MD based on my observations and the provider's statements to me.         Jeff Bautista MD  11/09/18 [id number]

## 2018-10-20 NOTE — ED AVS SNAPSHOT
[facility]    [address] [place] [address]    Phone:  [phone]    Fax:  [phone]                                       Armen Phelps   MRN: [id number]    Department:  [facility] [place] [facility]   Date of Visit:  10/20/2018           Patient Information     Date Of Birth          1945        Your diagnoses for this visit were:     Nausea and vomiting, intractability of vomiting not specified, unspecified vomiting type        You were seen by Ronald Vázquez MD and Jeff Bautista MD.      Follow-up Information     Follow up with Gerald Dickerson MD In 2 days.    Specialty:  Gastroenterology    Contact information:    [facility]  [address] [place] [address]  [phone]          Discharge Instructions       Decrease alcohol consumption recommendation for US male is two 5 ounce glasses of wine/day, or two 12 ounce beers/day, or two 1 ounce shots of hard liquor daily.  Take your aspirin after a full meal.  Use tylenol for pain, not ibuprofen.  Follow up for GI evaluation and possible endoscopy.      Discharge Instructions  Vomiting    You have been seen today for vomiting. This is usually caused by a virus, but some bacteria, parasites, medicines or other medical conditions can cause similar symptoms. At this time your doctor does not find that your vomiting is a sign of anything dangerous or life-threatening. However, sometimes the signs of serious illness do not show up right away. If you have new or worse symptoms, you may need to be seen again in the emergency department or by your primary doctor. Remember that serious problems like appendicitis can start as vomiting.     Return to the Emergency Department if:    You keep throwing up and you are not able to keep liquids down.     You feel you are getting dehydrated, such as being very thirsty, not urinating at least every 8-12 hours, or feeling  faint or lightheaded.     You develop a new fever, or your fever continues for more than 2 days.     You have belly pain that seems worse than cramps, is in one spot, or is getting worse over time.     You have blood in your vomit or stools.     You feel very weak.    You are not starting to improve within 24 hours of your visit here.     What can I do to help myself?    The most important thing to do is to drink clear liquids. If you have been vomiting a lot, it is best to have only small, frequent sips of liquids. Drinking too much at once may cause more vomiting. If you are vomiting often, you must replace minerals, sodium and potassium lost with your illness. Pedialyte  and sports drinks can help you replace these minerals. You can also drink clear liquids such as water, weak tea, apple juice, and 7-Up . Avoid acid liquids (orange), caffeine (coffee) or alcohol. Do not drink milk until you no longer have diarrhea.     After liquids are staying down, you may start eating mild foods. Soda crackers, toast, plain noodles, gelatin, applesauce and bananas are good first choices. Avoid foods that have acid, are spicy, fatty or have a lot of fiber (such as meats, coarse grains, vegetables). You may start eating these foods again in about 3 days when you are better.     Sometimes treatment includes prescription medicine to prevent nausea and vomiting. If your doctor prescribes these for you, take them as directed.     Don t take ibuprofen, or other nonsteroidal anti-inflammatory medicines without checking with your healthcare provider.   If you were given a prescription for medicine here today, be sure to read all of the information (including the package insert) that comes with your prescription.  This will include important information about the medicine, its side effects, and any warnings that you need to know about.  The pharmacist who fills the prescription can provide more information and answer questions you may  have about the medicine.  If you have questions or concerns that the pharmacist cannot address, please call or return to the Emergency Department.       Opioid Medication Information    Pain medications are among the most commonly prescribed medicines, so we are including this information for all our patients. If you did not receive pain medication or get a prescription for pain medicine, you can ignore it.     You may have been given a prescription for an opioid (narcotic) pain medicine and/or have received a pain medicine while here in the Emergency Department. These medicines can make you drowsy or impaired. You must not drive, operate dangerous equipment, or engage in any other dangerous activities while taking these medications. If you drive while taking these medications, you could be arrested for DUI, or driving under the influence. Do not drink any alcohol while you are taking these medications.     Opioid pain medications can cause addiction. If you have a history of chemical dependency of any type, you are at a higher risk of becoming addicted to pain medications.  Only take these prescribed medications to treat your pain when all other options have been tried. Take it for as short a time and as few doses as possible. Store your pain pills in a secure place, as they are frequently stolen and provide a dangerous opportunity for children or visitors in your house to start abusing these powerful medications. We will not replace any lost or stolen medicine.  As soon as your pain is better, you should flush all your remaining medication.     Many prescription pain medications contain Tylenol  (acetaminophen), including Vicodin , Tylenol #3 , Norco , Lortab , and Percocet .  You should not take any extra pills of Tylenol  if you are using these prescription medications or you can get very sick.  Do not ever take more than 3000 mg of acetaminophen in any 24 hour period.    All opioids tend to cause constipation.  Drink plenty of water and eat foods that have a lot of fiber, such as fruits, vegetables, prune juice, apple juice and high fiber cereal.  Take a laxative if you don t move your bowels at least every other day. Miralax , Milk of Magnesia, Colace , or Senna  can be used to keep you regular.      Remember that you can always come back to the Emergency Department if you are not able to see your regular doctor in the amount of time listed above, if you get any new symptoms, or if there is anything that worries you.        24 Hour Appointment Hotline       To make an appointment at any [facility], call [phone] ([phone]). If you don't have a family doctor or clinic, we will help you find one. [facility] clinics are conveniently located to serve the needs of you and your family.          ED Discharge Orders     GASTROENTEROLOGY ADULT REF CONSULT ONLY       Preferred Location: [facility] [phone]      Please be aware that coverage of these services is subject to the terms and limitations of your health insurance plan.  Call member services at your health plan with any benefit or coverage questions.  Any procedures must be performed at a [facility] facility OR coordinated by your clinic's referral office.    Please bring the following with you to your appointment:    (1) Any X-Rays, CTs or MRIs which have been performed.  Contact the facility where they were done to arrange for  prior to your scheduled appointment.    (2) List of current medications   (3) This referral request   (4) Any documents/labs given to you for this referral                     Review of your medicines      START taking        Dose / Directions Last dose taken    * promethazine 25 MG tablet   Commonly known as:  PHENERGAN   Dose:  25 mg   Quantity:  15 tablet        Take 1 tablet (25 mg) by mouth every 6 hours as needed for nausea   Refills:  0        * promethazine 25 MG Suppository   Commonly known as:  PHENERGAN    Dose:  25 mg   Quantity:  20 suppository        Place 1 suppository (25 mg) rectally every 6 hours as needed for nausea   Refills:  1        * Notice:  This list has 2 medication(s) that are the same as other medications prescribed for you. Read the directions carefully, and ask your doctor or other care provider to review them with you.      Our records show that you are taking the medicines listed below. If these are incorrect, please call your family doctor or clinic.        Dose / Directions Last dose taken    ASPIRIN ADULT LOW STRENGTH PO   Dose:  81 mg        Take 81 mg by mouth   Refills:  0        LISINOPRIL PO   Dose:  20 mg        Take 20 mg by mouth   Refills:  0        PROTONIX PO   Dose:  40 mg        Take 40 mg by mouth   Refills:  0        SIMVASTATIN PO        Refills:  0                Prescriptions were sent or printed at these locations (2 Prescriptions)                   Other Prescriptions                Printed at Department/Unit printer (2 of 2)         promethazine (PHENERGAN) 25 MG Suppository               promethazine (PHENERGAN) 25 MG tablet                Procedures and tests performed during your visit     Abd/pelvis CT,  IV  contrast only TRAUMA / AAA    Alcohol ethyl    CBC with platelets differential    Comprehensive metabolic panel    Creatinine POCT    EKG 12-lead, tracing only    ISTAT creatinine nursing POCT    Lipase    Troponin I      Orders Needing Specimen Collection     None      Pending Results     Date and Time Order Name Status Description    10/20/2018 1524 EKG 12-lead, tracing only Preliminary             Pending Culture Results     No orders found from 10/18/2018 to 10/21/2018.            Pending Results Instructions     If you had any lab results that were not finalized at the time of your Discharge, you can call the ED Lab Result RN at [phone]. You will be contacted by this team for any positive Lab results or changes in treatment. The nurses are available 7  days a week from 10A to 6:30P.  You can leave a message 24 hours per day and they will return your call.        Test Results From Your Hospital Stay              10/20/2018  4:47 PM      Component Results     Component Value Ref Range & Units Status    Sodium 136 133 - 144 mmol/L Final    Potassium 3.4 3.4 - 5.3 mmol/L Final    Chloride 99 94 - 109 mmol/L Final    Carbon Dioxide 23 20 - 32 mmol/L Final    Anion Gap 14 3 - 14 mmol/L Final    Glucose 139 (H) 70 - 99 mg/dL Final    Urea Nitrogen 17 7 - 30 mg/dL Final    Creatinine 0.92 0.66 - 1.25 mg/dL Final    GFR Estimate 81 >60 mL/min/1.7m2 Final    Non  GFR Calc    GFR Estimate If Black >90 >60 mL/min/1.7m2 Final    African American GFR Calc    Calcium 9.0 8.5 - 10.1 mg/dL Final    Bilirubin Total 0.9 0.2 - 1.3 mg/dL Final    Albumin 4.4 3.4 - 5.0 g/dL Final    Protein Total 7.4 6.8 - 8.8 g/dL Final    Alkaline Phosphatase 61 40 - 150 U/L Final    ALT 27 0 - 70 U/L Final    AST 42 0 - 45 U/L Final         10/20/2018  4:47 PM      Component Results     Component Value Ref Range & Units Status    Lipase 135 73 - 393 U/L Final         10/20/2018  4:47 PM      Component Results     Component Value Ref Range & Units Status    Troponin I ES <0.015 0.000 - 0.045 ug/L Final    The 99th percentile for upper reference range is 0.045 ug/L.  Troponin values   in the range of 0.045 - 0.120 ug/L may be associated with risks of adverse   clinical events.           10/20/2018  5:32 PM      Narrative     CT ABDOMEN AND PELVIS WITH CONTRAST   10/20/2018 4:44 PM     HISTORY: Abdominal pain and vomiting.    TECHNIQUE: 93 mL Isovue-370 IV were administered. After contrast  administration, volumetric helical sections were acquired from the  lung bases to the ischial tuberosities. Coronal images were also  reconstructed. Radiation dose for this scan was reduced using  automated exposure control, adjustment of the mA and/or kV according  to patient size, or iterative  reconstruction technique.    COMPARISON: None.     FINDINGS: No bowel obstruction. Unremarkable appendix. No free fluid  in the pelvis. No convincing evidence for colitis or diverticulitis.  Small fat-containing paraumbilical hernia. Mild atherosclerotic  aortoiliac calcification. Prior cholecystectomy. A few small bilateral  renal cysts, with the largest on the left measuring 2.8 cm. 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  The liver, spleen, adrenal glands, pancreas, and kidneys are otherwise  unremarkable. No hydronephrosis. Mild prostatic enlargement.  Degenerative changes are noted throughout the visualized thoracolumbar  spine.        Impression     IMPRESSION: No acute abnormality in the abdomen or pelvis. No cause  for abdominal pain or vomiting is identified. No bowel obstruction.    MARIE SIDDIQUI MD         10/20/2018  4:10 PM      Component Results     Component Value Ref Range & Units Status    Creatinine 0.9 0.66 - 1.25 mg/dL Final    GFR Estimate 83 >60 mL/min/1.7m2 Final    GFR Estimate If Black >90 >60 mL/min/1.7m2 Final         10/20/2018  4:47 PM      Component Results     Component Value Ref Range & Units Status    Ethanol g/dL <0.01 <0.01 g/dL Final         10/20/2018  4:56 PM      Component Results     Component Value Ref Range & Units Status    WBC 4.1 4.0 - 11.0 10e9/L Final    RBC Count 3.34 (L) 4.4 - 5.9 10e12/L Final    Hemoglobin 11.7 (L) 13.3 - 17.7 g/dL Final    Hematocrit 35.1 (L) 40.0 - 53.0 % Final     (H) 78 - 100 fl Final    MCH 35.0 (H) 26.5 - 33.0 pg Final    MCHC 33.3 31.5 - 36.5 g/dL Final    RDW 14.2 10.0 - 15.0 % Final    Platelet Count 140 (L) 150 - 450 10e9/L Final    Diff Method Automated Method  Final    % Neutrophils 89.6 % Final    % Lymphocytes 5.1 % Final    % Monocytes 4.6 % Final    % Eosinophils 0.0 % Final    % Basophils 0.2 % Final    % Immature  Granulocytes 0.5 % Final    Nucleated RBCs 0 0 /100 Final    Absolute Neutrophil 3.7 1.6 - 8.3 10e9/L Final    Absolute Lymphocytes 0.2 (L) 0.8 - 5.3 10e9/L Final    Absolute Monocytes 0.2 0.0 - 1.3 10e9/L Final    Absolute Eosinophils 0.0 0.0 - 0.7 10e9/L Final    Absolute Basophils 0.0 0.0 - 0.2 10e9/L Final    Abs Immature Granulocytes 0.0 0 - 0.4 10e9/L Final    Absolute Nucleated RBC 0.0  Final                Clinical Quality Measure: Blood Pressure Screening     Your blood pressure was checked while you were in the emergency department today. The last reading we obtained was  BP: (!) 141/93 . Please read the guidelines below about what these numbers mean and what you should do about them.  If your systolic blood pressure (the top number) is less than 120 and your diastolic blood pressure (the bottom number) is less than 80, then your blood pressure is normal. There is nothing more that you need to do about it.  If your systolic blood pressure (the top number) is 120-139 or your diastolic blood pressure (the bottom number) is 80-89, your blood pressure may be higher than it should be. You should have your blood pressure rechecked within a year by a primary care provider.  If your systolic blood pressure (the top number) is 140 or greater or your diastolic blood pressure (the bottom number) is 90 or greater, you may have high blood pressure. High blood pressure is treatable, but if left untreated over time it can put you at risk for heart attack, stroke, or kidney failure. You should have your blood pressure rechecked by a primary care provider within the next 4 weeks.  If your provider in the emergency department today gave you specific instructions to follow-up with your doctor or provider even sooner than that, you should follow that instruction and not wait for up to 4 weeks for your follow-up visit.        Thank you for choosing [facility]       Thank you for choosing [facility] for your care. Our goal is  "always to provide you with excellent care. Hearing back from our patients is one way we can continue to improve our services. Please take a few minutes to complete the written survey that you may receive in the mail after you visit with us. Thank you!        mmCHANNEL Information     mmCHANNEL lets you send messages to your doctor, view your test results, renew your prescriptions, schedule appointments and more. To sign up, go to [email or web address] . Click on \"Log in\" on the left side of the screen, which will take you to the Welcome page. Then click on \"Sign up Now\" on the right side of the page.     You will be asked to enter the access code listed below, as well as some personal information. Please follow the directions to create your username and password.     Your access code is: [email or web address]  Expires: 2019  6:44 PM     Your access code will  in 90 days. If you need help or a new code, please call your [facility] clinic or [phone].        Care EveryWhere ID     This is your Care EveryWhere ID. This could be used by other organizations to access your [facility] medical records  [id number]        Equal Access to Services     ROXANNA JAMES : Martinez [place], watam celestin, john ugarte, cory buchanan. So [facility] [phone].    ATENCIÓN: Si habla español, tiene a guevara disposición servicios gratuitos de asistencia lingüística. Kimberley al [phone].    We comply with applicable federal civil rights laws and [place] laws. We do not discriminate on the basis of race, color, national origin, age, disability, sex, sexual orientation, or gender identity.            After Visit Summary       This is your record. Keep this with you and show to your community pharmacist(s) and doctor(s) at your next visit.                  "

## 2018-10-20 NOTE — ED AVS SNAPSHOT
[facility]    [address]    [facility] [address]    Phone:  [phone]    Fax:  [phone]                                       Armen Phelps   MRN: [id number]    Department:  [facility] Emergency Department   Date of Visit:  10/20/2018           After Visit Summary Signature Page     I have received my discharge instructions, and my questions have been answered. I have discussed any challenges I see with this plan with the nurse or doctor.    ..........................................................................................................................................  Patient/Patient Representative Signature      ..........................................................................................................................................  Patient Representative Print Name and Relationship to Patient    ..................................................               ................................................  Date                                   Time    ..........................................................................................................................................  Reviewed by Signature/Title    ...................................................              ..............................................  Date                                               Time          22EPIC Rev 08/18

## 2018-10-20 NOTE — DISCHARGE INSTRUCTIONS
Decrease alcohol consumption recommendation for US male is two 5 ounce glasses of wine/day, or two 12 ounce beers/day, or two 1 ounce shots of hard liquor daily.  Take your aspirin after a full meal.  Use tylenol for pain, not ibuprofen.  Follow up for GI evaluation and possible endoscopy.      Discharge Instructions  Vomiting    You have been seen today for vomiting. This is usually caused by a virus, but some bacteria, parasites, medicines or other medical conditions can cause similar symptoms. At this time your doctor does not find that your vomiting is a sign of anything dangerous or life-threatening. However, sometimes the signs of serious illness do not show up right away. If you have new or worse symptoms, you may need to be seen again in the emergency department or by your primary doctor. Remember that serious problems like appendicitis can start as vomiting.     Return to the Emergency Department if:    You keep throwing up and you are not able to keep liquids down.     You feel you are getting dehydrated, such as being very thirsty, not urinating at least every 8-12 hours, or feeling faint or lightheaded.     You develop a new fever, or your fever continues for more than 2 days.     You have belly pain that seems worse than cramps, is in one spot, or is getting worse over time.     You have blood in your vomit or stools.     You feel very weak.    You are not starting to improve within 24 hours of your visit here.     What can I do to help myself?    The most important thing to do is to drink clear liquids. If you have been vomiting a lot, it is best to have only small, frequent sips of liquids. Drinking too much at once may cause more vomiting. If you are vomiting often, you must replace minerals, sodium and potassium lost with your illness. Pedialyte  and sports drinks can help you replace these minerals. You can also drink clear liquids such as water, weak tea, apple juice, and 7-Up . Avoid acid liquids  (orange), caffeine (coffee) or alcohol. Do not drink milk until you no longer have diarrhea.     After liquids are staying down, you may start eating mild foods. Soda crackers, toast, plain noodles, gelatin, applesauce and bananas are good first choices. Avoid foods that have acid, are spicy, fatty or have a lot of fiber (such as meats, coarse grains, vegetables). You may start eating these foods again in about 3 days when you are better.     Sometimes treatment includes prescription medicine to prevent nausea and vomiting. If your doctor prescribes these for you, take them as directed.     Don t take ibuprofen, or other nonsteroidal anti-inflammatory medicines without checking with your healthcare provider.   If you were given a prescription for medicine here today, be sure to read all of the information (including the package insert) that comes with your prescription.  This will include important information about the medicine, its side effects, and any warnings that you need to know about.  The pharmacist who fills the prescription can provide more information and answer questions you may have about the medicine.  If you have questions or concerns that the pharmacist cannot address, please call or return to the Emergency Department.       Opioid Medication Information    Pain medications are among the most commonly prescribed medicines, so we are including this information for all our patients. If you did not receive pain medication or get a prescription for pain medicine, you can ignore it.     You may have been given a prescription for an opioid (narcotic) pain medicine and/or have received a pain medicine while here in the Emergency Department. These medicines can make you drowsy or impaired. You must not drive, operate dangerous equipment, or engage in any other dangerous activities while taking these medications. If you drive while taking these medications, you could be arrested for DUI, or driving under the  influence. Do not drink any alcohol while you are taking these medications.     Opioid pain medications can cause addiction. If you have a history of chemical dependency of any type, you are at a higher risk of becoming addicted to pain medications.  Only take these prescribed medications to treat your pain when all other options have been tried. Take it for as short a time and as few doses as possible. Store your pain pills in a secure place, as they are frequently stolen and provide a dangerous opportunity for children or visitors in your house to start abusing these powerful medications. We will not replace any lost or stolen medicine.  As soon as your pain is better, you should flush all your remaining medication.     Many prescription pain medications contain Tylenol  (acetaminophen), including Vicodin , Tylenol #3 , Norco , Lortab , and Percocet .  You should not take any extra pills of Tylenol  if you are using these prescription medications or you can get very sick.  Do not ever take more than 3000 mg of acetaminophen in any 24 hour period.    All opioids tend to cause constipation. Drink plenty of water and eat foods that have a lot of fiber, such as fruits, vegetables, prune juice, apple juice and high fiber cereal.  Take a laxative if you don t move your bowels at least every other day. Miralax , Milk of Magnesia, Colace , or Senna  can be used to keep you regular.      Remember that you can always come back to the Emergency Department if you are not able to see your regular doctor in the amount of time listed above, if you get any new symptoms, or if there is anything that worries you.

## 2018-10-21 LAB — INTERPRETATION ECG - MUSE: NORMAL

## 2019-07-09 ENCOUNTER — APPOINTMENT (OUTPATIENT)
Dept: CT IMAGING | Facility: CLINIC | Age: 74
End: 2019-07-09
Attending: EMERGENCY MEDICINE
Payer: COMMERCIAL

## 2019-07-09 ENCOUNTER — HOSPITAL ENCOUNTER (EMERGENCY)
Facility: CLINIC | Age: 74
Discharge: HOME | End: 2019-07-10
Attending: EMERGENCY MEDICINE | Admitting: EMERGENCY MEDICINE
Payer: COMMERCIAL

## 2019-07-09 DIAGNOSIS — F10.920 ALCOHOLIC INTOXICATION WITHOUT COMPLICATION (H): ICD-10-CM

## 2019-07-09 LAB
BASOPHILS # BLD AUTO: 0 10E9/L (ref 0–0.2)
BASOPHILS NFR BLD AUTO: 0.2 %
DIFFERENTIAL METHOD BLD: ABNORMAL
EOSINOPHIL # BLD AUTO: 0.1 10E9/L (ref 0–0.7)
EOSINOPHIL NFR BLD AUTO: 1.4 %
ERYTHROCYTE [DISTWIDTH] IN BLOOD BY AUTOMATED COUNT: 14.2 % (ref 10–15)
ETHANOL SERPL-MCNC: 0.33 G/DL
HCT VFR BLD AUTO: 34.6 % (ref 40–53)
HGB BLD-MCNC: 11.6 G/DL (ref 13.3–17.7)
IMM GRANULOCYTES # BLD: 0 10E9/L (ref 0–0.4)
IMM GRANULOCYTES NFR BLD: 0.7 %
LYMPHOCYTES # BLD AUTO: 0.8 10E9/L (ref 0.8–5.3)
LYMPHOCYTES NFR BLD AUTO: 13.5 %
MCH RBC QN AUTO: 36 PG (ref 26.5–33)
MCHC RBC AUTO-ENTMCNC: 33.5 G/DL (ref 31.5–36.5)
MCV RBC AUTO: 108 FL (ref 78–100)
MONOCYTES # BLD AUTO: 0.3 10E9/L (ref 0–1.3)
MONOCYTES NFR BLD AUTO: 4.2 %
NEUTROPHILS # BLD AUTO: 4.7 10E9/L (ref 1.6–8.3)
NEUTROPHILS NFR BLD AUTO: 80 %
NRBC # BLD AUTO: 0 10*3/UL
NRBC BLD AUTO-RTO: 0 /100
PLATELET # BLD AUTO: 181 10E9/L (ref 150–450)
RBC # BLD AUTO: 3.22 10E12/L (ref 4.4–5.9)
TROPONIN I SERPL-MCNC: <0.015 UG/L (ref 0–0.04)
WBC # BLD AUTO: 5.9 10E9/L (ref 4–11)

## 2019-07-09 PROCEDURE — 93005 ELECTROCARDIOGRAM TRACING: CPT

## 2019-07-09 PROCEDURE — 25000128 H RX IP 250 OP 636: Performed by: EMERGENCY MEDICINE

## 2019-07-09 PROCEDURE — 80320 DRUG SCREEN QUANTALCOHOLS: CPT | Performed by: EMERGENCY MEDICINE

## 2019-07-09 PROCEDURE — 85025 COMPLETE CBC W/AUTO DIFF WBC: CPT | Performed by: EMERGENCY MEDICINE

## 2019-07-09 PROCEDURE — 90471 IMMUNIZATION ADMIN: CPT

## 2019-07-09 PROCEDURE — 96360 HYDRATION IV INFUSION INIT: CPT

## 2019-07-09 PROCEDURE — 99285 EMERGENCY DEPT VISIT HI MDM: CPT | Mod: 25

## 2019-07-09 PROCEDURE — 80053 COMPREHEN METABOLIC PANEL: CPT | Performed by: EMERGENCY MEDICINE

## 2019-07-09 PROCEDURE — 72125 CT NECK SPINE W/O DYE: CPT

## 2019-07-09 PROCEDURE — 36415 COLL VENOUS BLD VENIPUNCTURE: CPT | Performed by: EMERGENCY MEDICINE

## 2019-07-09 PROCEDURE — 90715 TDAP VACCINE 7 YRS/> IM: CPT | Performed by: EMERGENCY MEDICINE

## 2019-07-09 PROCEDURE — 84484 ASSAY OF TROPONIN QUANT: CPT | Performed by: EMERGENCY MEDICINE

## 2019-07-09 PROCEDURE — 70450 CT HEAD/BRAIN W/O DYE: CPT

## 2019-07-09 RX ADMIN — CLOSTRIDIUM TETANI TOXOID ANTIGEN (FORMALDEHYDE INACTIVATED), CORYNEBACTERIUM DIPHTHERIAE TOXOID ANTIGEN (FORMALDEHYDE INACTIVATED), BORDETELLA PERTUSSIS TOXOID ANTIGEN (GLUTARALDEHYDE INACTIVATED), BORDETELLA PERTUSSIS FILAMENTOUS HEMAGGLUTININ ANTIGEN (FORMALDEHYDE INACTIVATED), BORDETELLA PERTUSSIS PERTACTIN ANTIGEN, AND BORDETELLA PERTUSSIS FIMBRIAE 2/3 ANTIGEN 0.5 ML: 5; 2; 2.5; 5; 3; 5 INJECTION, SUSPENSION INTRAMUSCULAR at 23:50

## 2019-07-09 RX ADMIN — SODIUM CHLORIDE 1000 ML: 9 INJECTION, SOLUTION INTRAVENOUS at 23:30

## 2019-07-09 ASSESSMENT — ENCOUNTER SYMPTOMS
VOMITING: 0
HEADACHES: 0

## 2019-07-09 NOTE — ED AVS SNAPSHOT
[facility] Emergency Department  [address]  [facility] [address]  Phone:  [phone]  Fax:  [phone]                                    Armen Phelps   MRN: [id number]    Department:  [facility] Emergency Department   Date of Visit:  7/9/2019           After Visit Summary Signature Page    I have received my discharge instructions, and my questions have been answered. I have discussed any challenges I see with this plan with the nurse or doctor.    ..........................................................................................................................................  Patient/Patient Representative Signature      ..........................................................................................................................................  Patient Representative Print Name and Relationship to Patient    ..................................................               ................................................  Date                                   Time    ..........................................................................................................................................  Reviewed by Signature/Title    ...................................................              ..............................................  Date                                               Time          22EPIC Rev 08/18

## 2019-07-10 VITALS
BODY MASS INDEX: 25.83 KG/M2 | WEIGHT: 180 LBS | HEART RATE: 77 BPM | TEMPERATURE: 97.6 F | RESPIRATION RATE: 16 BRPM | OXYGEN SATURATION: 96 % | SYSTOLIC BLOOD PRESSURE: 120 MMHG | DIASTOLIC BLOOD PRESSURE: 85 MMHG

## 2019-07-10 LAB
ALBUMIN SERPL-MCNC: 3.7 G/DL (ref 3.4–5)
ALP SERPL-CCNC: 54 U/L (ref 40–150)
ALT SERPL W P-5'-P-CCNC: 29 U/L (ref 0–70)
ANION GAP SERPL CALCULATED.3IONS-SCNC: 8 MMOL/L (ref 3–14)
AST SERPL W P-5'-P-CCNC: 35 U/L (ref 0–45)
BILIRUB SERPL-MCNC: 0.4 MG/DL (ref 0.2–1.3)
BUN SERPL-MCNC: 13 MG/DL (ref 7–30)
CALCIUM SERPL-MCNC: 7.6 MG/DL (ref 8.5–10.1)
CHLORIDE SERPL-SCNC: 110 MMOL/L (ref 94–109)
CO2 SERPL-SCNC: 24 MMOL/L (ref 20–32)
CREAT SERPL-MCNC: 0.78 MG/DL (ref 0.66–1.25)
GFR SERPL CREATININE-BSD FRML MDRD: 87 ML/MIN/{1.73_M2}
GLUCOSE SERPL-MCNC: 107 MG/DL (ref 70–99)
INTERPRETATION ECG - MUSE: NORMAL
POTASSIUM SERPL-SCNC: 3.7 MMOL/L (ref 3.4–5.3)
PROT SERPL-MCNC: 6.5 G/DL (ref 6.8–8.8)
SODIUM SERPL-SCNC: 142 MMOL/L (ref 133–144)

## 2019-07-10 RX ORDER — ONDANSETRON 4 MG/1
4 TABLET, ORALLY DISINTEGRATING ORAL EVERY 8 HOURS PRN
COMMUNITY
End: 2020-06-17

## 2019-07-10 RX ORDER — LISINOPRIL AND HYDROCHLOROTHIAZIDE 12.5; 2 MG/1; MG/1
1 TABLET ORAL EVERY EVENING
COMMUNITY
End: 2020-12-03

## 2019-07-10 RX ORDER — ACETAMINOPHEN 325 MG/1
325-650 TABLET ORAL EVERY 6 HOURS PRN
COMMUNITY
End: 2022-02-14

## 2019-07-10 ASSESSMENT — ENCOUNTER SYMPTOMS
BACK PAIN: 0
NECK PAIN: 0
ABDOMINAL PAIN: 0

## 2019-07-10 NOTE — PHARMACY-ADMISSION MEDICATION HISTORY
Admission medication history interview status for this patient is complete. See [facility] admission navigator for allergy information, prior to admission medications and immunization status.     Medication history interview source(s):Patient  Medication history resources (including written lists, pill bottles, clinic record):eBillme/NaviExpert  Primary [facility]    Changes made to PTA medication list:  Added: Zofran, tylenol  Deleted:   Changed: lisinopril to combo product, dose /freq for ASA, Protonix, Zocor    Actions taken by pharmacist (provider contacted, etc):None     Additional medication history information:None    Medication reconciliation/reorder completed by provider prior to medication history? No    Do you take OTC medications (eg tylenol, ibuprofen, fish oil, eye/ear drops, etc)? Y(Y/N)    For patients on insulin therapy: NA (Y/N)      Prior to Admission medications    Medication Sig Last Dose Taking? Auth Provider   acetaminophen (TYLENOL) 325 MG tablet Take 325-650 mg by mouth every 6 hours as needed for mild pain  Yes Unknown, Entered By History   ASPIRIN ADULT LOW STRENGTH PO Take 81 mg by mouth daily as needed  Past Month at Unknown time Yes Reported, Patient   lisinopril-hydrochlorothiazide (PRINZIDE/ZESTORETIC) 20-12.5 MG tablet Take 1 tablet by mouth every evening 7/9/2019 at Unknown time Yes Unknown, Entered By History   ondansetron (ZOFRAN-ODT) 4 MG ODT tab Take 4 mg by mouth every 8 hours as needed for nausea  Yes Unknown, Entered By History   pantoprazole (PROTONIX) 40 MG EC tablet Take 40 mg by mouth every evening  7/9/2019 at Unknown time Yes Reported, Patient   simvastatin (ZOCOR) 80 MG tablet Take 80 mg by mouth every evening  7/9/2019 at Unknown time Yes Reported, Patient

## 2019-07-10 NOTE — ED NOTES
Pt stood up to urinate pulled off his iv's and cloths was somewhat unsteady. Says he has know one to call to come and get him.

## 2019-07-10 NOTE — ED TRIAGE NOTES
Pt presents via EMS, neighbor called PD after finding pt curled up at the top of his concrete steps. Pt was not wearing pants, and found incontinent of urine.   Hx of hemochromatosis, and does frequent blood draws, per medics, wife reports pt does not drink frequently but does become intoxicated easily.  C-collar in place. Pt denies pain. ABCs intact.  PVC 0.267, .

## 2019-07-10 NOTE — ED NOTES
"Pt up walking to bathroom. Steady on feet. Let him know wife on her way back. \" pt stated Im in a lot of trouble.\"  "

## 2019-07-10 NOTE — ED PROVIDER NOTES
"  History     Chief Complaint:  Alcohol Intoxication    The history is provided by the EMS personnel and the patient. The history is limited by the condition of the patient.      Armen Phelps is a 74 year old male with a history of hemochromatosis, HTN, and HLD who presents via EMS for evaluation due to alcohol intoxication. Per EMS, Armen was found by a neighbor \"curled up\" at the top of his concrete steps. He endorsed use of alcohol today and had Breathalyzer of 0.267. He has been incontinent of urine. His wife is out of town and there was no witnessed fall. Armen reports he had \"a couple of drinks\" but does not believe he fell. He denies any pain including headache and has had no emesis. His last tetanus booster is unknown.    Allergies:  NKDA     Medications:    Aspirin Adult Low Strength  Lisinopril  Protonix  Phenergan  Simvastatin  Zocor  Ativan    Past Medical History:    HTN  Spinal stenosis of lumbar region  HNP, lumbar  Thoracic or lumbosacral neuritis or radiulitis  GERD  HLD  Hemochromatosis     Past Surgical History:    Bilateral lumbar laminectomy L2-3 and L3-4 with left discectomy and excision of left cyst  Laparoscopic colecystectomy    Family History:    No past pertinent family history.    Social History:  Smoking Status: Former Smoker  Smokeless Tobacco: Negative  Alcohol Use: Positive, see HPI  Drug Use: Negative  Marital Status:     Lives with his wife who is out of town.    Review of Systems   Unable to perform ROS: Other (Intoxicated)   Cardiovascular: Negative for chest pain.   Gastrointestinal: Negative for abdominal pain and vomiting.   Musculoskeletal: Negative for back pain and neck pain.   Neurological: Negative for headaches.   Psychiatric/Behavioral:        Intoxication     Physical Exam     Patient Vitals for the past 24 hrs:   BP Temp Temp src Pulse Heart Rate Resp SpO2 Weight   07/10/19 0500 116/81 -- -- 81 -- -- -- --   07/10/19 0445 125/81 -- -- -- -- -- -- --   07/10/19 " 0415 120/77 -- -- -- -- -- 96 % --   07/10/19 0400 116/70 -- -- 73 -- -- 93 % --   07/10/19 0345 123/77 -- -- -- -- -- 95 % --   07/10/19 0300 98/60 -- -- 76 -- -- 94 % --   07/10/19 0245 124/70 -- -- -- -- -- 93 % --   07/10/19 0230 -- -- -- -- -- -- 95 % --   07/10/19 0215 102/61 -- -- -- -- -- 96 % --   07/10/19 0200 109/67 -- -- 80 -- -- 96 % --   07/10/19 0030 -- -- -- -- 86 -- 95 % --   07/10/19 0015 (!) 137/91 -- -- -- 91 27 -- --   07/10/19 0000 102/72 -- -- 92 83 -- 93 % --   07/09/19 2230 -- -- -- -- 89 15 -- --   07/09/19 2215 -- -- -- -- 85 10 97 % --   07/09/19 2213 (!) 152/93 97.6  F (36.4  C) Oral -- 86 16 100 % 81.6 kg (180 lb)       Physical Exam  General: Well-developed and well-nourished. Well appearing elderly  man. Cooperative.  Head:  Atraumatic.  Eyes:  Conjunctivae, lids, and sclerae are normal.  ENT:    Normal nose. Moist mucous membranes.  Neck:  Cervical collar in place.  CV:  Regular rate and rhythm. Normal heart sounds with no murmurs, rubs, or gallops detected.  Resp:  No respiratory distress. Clear to auscultation bilaterally without decreased breath sounds, wheezing, rales, or rhonchi.  GI:  Soft. Non-distended. Non-tender.    MS:  Normal ROM. No bilateral lower extremity edema.  No midline tenderness of the thoracic or lumbar spine.  Skin:  Warm. Non-diaphoretic. No pallor.  Abrasion on the lateral aspect of the left knee.  Neuro: Awake. A&Ox3. Normal strength.  Dysarthric speech.  PERRL.  Psych: Normal mood and affect. Normal speech.  Vitals reviewed.      Emergency Department Course   EKG  Indication: Fall  Time: 2217  Rate 83 bpm. NJ interval 160. QRS duration 86. QT/QTc 384/451.   Normal sinus rhythm. Normal ECG.   No acute ST changes.  No significant change as compared to prior, dated 10/20/18.    Imaging:  Radiographic findings were communicated with the patient who voiced understanding of the findings.    CT Head without contrast:   1.  No CT finding of mass, infarct  or hemorrhage.  2.  Age-related changes as per radiology.    CT Cervical Spine without contrast:   1.  No acute cervical spine abnormality.   2.  Cervical spondylosis at C3-C4, C4-C5, and C5-C6 with foraminal stenosis as described. as per radiology.    Laboratory:  CBC: WBC: 5.9, HGB: 11.6 (L), PLT: 181  CMP: Glucose 107 (H), Chloride 10 (H), Calcium 7.6 (L), Protein total 6.5 (L), o/w WNL (Creatinine: 0.78)    Alcohol ethyl: 0.33 (H)    2317 Troponin: <0.015    Interventions:  2330 NS 1L IV  2350 Tdap 0.5 mL IM    Emergency Department Course:  Nursing notes and vitals reviewed. (2209) I performed an exam of the patient as documented above.      IV inserted. Medicine administered as documented above. Blood drawn. This was sent to the lab for further testing, results above.     The patient was sent for a CT head and cervical spine while in the emergency department, findings above.      (0006) I removed cervical collar and advised he call a friend to come pick him up since his wife is out of town.    (8130) I rechecked the patient and discussed the results of his workup thus far.    I personally answered all related questions prior to signing out the patient to Dr. Roblero.  Impression & Plan    Medical Decision Making:  Armen is a 74-year-old man with hemochromatosis who was found at the top of his steps by a neighbor intoxicated.  There is no known fall, but the patient was placed in a cervical collar by paramedics.  Armen denies any concerns or complaints and generally appears well on exam.  He does have an abrasion on the lateral aspect of his left knee though no other evidence of trauma including no focal bony tenderness of the thoracic or lumbar spine.  However, given his intoxicated state I am unable to clear his cervical collar and he was sent for CT of the cervical spine.  Fortunately, this shows only chronic spondylosis without acute fracture or malalignment.  Further, CT of the head reveals no skull fracture  or intracranial hemorrhage.  EKG is reassuring without acute ST changes or arrhythmias and troponin is negative.  Without complaint of chest pain or shortness of breath I highly doubt a cardiac etiology and presentation today appears to be solely related to intoxication.  There is no evidence of kidney injury or significant electrolyte disturbances.  There is no evidence of biliary obstruction, hepatitis, leukocytosis, with baseline macrocytic anemia to 11.6.  Blood alcohol is elevated at 0.33 as expected.  Patient was placed on health officer hold for his safety given he is intoxicated, and he was allowed to sober in the emergency department.  He was calm, cooperative, and without complaint while under my care.  However, at the end of my shift he will require further time for sobriety as he is unable to find a safe ride home.  His wife is out of town currently but has called to let us know she is on her way back home and will be able to pick him up later this morning.  At the end of my shift, patient was endorsed my partner, Dr. Roblero, pending sobriety and/or safe ride for discharge.    Diagnosis:    ICD-10-CM    1. Alcoholic intoxication without complication (H) F10.920        Disposition:  Signed out to Dr. Roblero    Scribe Disclosure:  I, Amanda Pradhan, am serving as a scribe on 7/9/2019 at 10:58 PM to personally document services performed by Rosamaria Vera MD based on my observations and the provider's statements to me.       Amanda Pradhan  7/9/2019   [facility] EMERGENCY DEPARTMENT       Rosamaria Vera MD  07/10/19 3302

## 2019-07-10 NOTE — ED PROVIDER NOTES
Patient changed over from Dr. Dr. Vera to be discharged once he has metabolized the effects of alcohol.  Reexamination at 1115 reveals patient is alert and oriented x3.  He is ambulatory.  Speech and dee are normal. He has no concerns at this time.  He is clinically sober.  He has no suicidal or homicidal ideation.  Patient feels comfortable with discharge home.  KENNY discontinued.  Patient safe for discharge.     Alberto Roblero MD  07/10/19 1117

## 2020-06-17 ENCOUNTER — OFFICE VISIT (OUTPATIENT)
Dept: FAMILY MEDICINE | Facility: CLINIC | Age: 75
End: 2020-06-17

## 2020-06-17 VITALS
HEIGHT: 68 IN | TEMPERATURE: 98.3 F | WEIGHT: 177.8 LBS | BODY MASS INDEX: 26.95 KG/M2 | OXYGEN SATURATION: 95 % | DIASTOLIC BLOOD PRESSURE: 80 MMHG | HEART RATE: 105 BPM | SYSTOLIC BLOOD PRESSURE: 162 MMHG

## 2020-06-17 DIAGNOSIS — Z76.89 HEALTH CARE HOME: ICD-10-CM

## 2020-06-17 DIAGNOSIS — Z71.89 ACP (ADVANCE CARE PLANNING): ICD-10-CM

## 2020-06-17 DIAGNOSIS — R25.1 TREMOR: ICD-10-CM

## 2020-06-17 DIAGNOSIS — E83.110 HEREDITARY HEMOCHROMATOSIS (H): ICD-10-CM

## 2020-06-17 DIAGNOSIS — R30.0 DYSURIA: ICD-10-CM

## 2020-06-17 DIAGNOSIS — I10 ESSENTIAL HYPERTENSION: ICD-10-CM

## 2020-06-17 DIAGNOSIS — R00.0 TACHYCARDIA: Primary | ICD-10-CM

## 2020-06-17 PROBLEM — F10.20: Status: ACTIVE | Noted: 2020-06-17

## 2020-06-17 LAB
% GRANULOCYTES: 71.3 %
ALBUMIN (URINE): ABNORMAL MG/DL
ALBUMIN SERPL-MCNC: 4 G/DL (ref 3.6–5.1)
ALBUMIN/GLOB SERPL: 1.8 {RATIO} (ref 1–2.5)
ALP SERPL-CCNC: 60 U/L (ref 33–130)
ALT 1742-6: 53 U/L (ref 0–32)
APPEARANCE UR: ABNORMAL
AST 1920-8: 113 U/L (ref 0–35)
BACTERIA, UR: ABNORMAL
BILIRUB SERPL-MCNC: 1 MG/DL (ref 0.2–1.2)
BILIRUB UR QL: ABNORMAL
BUN SERPL-MCNC: 7 MG/DL (ref 7–25)
BUN/CREATININE RATIO: 7.6 (ref 6–22)
CALCIUM SERPL-MCNC: 9.2 MG/DL (ref 8.6–10.3)
CASTS/LPF: ABNORMAL
CHLORIDE SERPLBLD-SCNC: 104.3 MMOL/L (ref 98–110)
CO2 SERPL-SCNC: 27.2 MMOL/L (ref 20–32)
COLOR UR: ABNORMAL
CREAT SERPL-MCNC: 0.92 MG/DL (ref 0.7–1.18)
EP/HPF: ABNORMAL
GLOBULIN, CALCULATED - QUEST: 2.2 (ref 1.9–3.7)
GLUCOSE SERPL-MCNC: 150 MG/DL (ref 60–99)
GLUCOSE URINE: ABNORMAL MG/DL
HCT VFR BLD AUTO: 40.5 % (ref 40–53)
HEMOGLOBIN: 13.4 G/DL (ref 13.3–17.7)
HGB UR QL: ABNORMAL
KETONES UR QL: ABNORMAL MG/DL
LEUKOCYTE ESTERASE - QUEST: ABNORMAL
LYMPHOCYTES NFR BLD AUTO: 18.3 %
MCH RBC QN AUTO: 37.4 PG (ref 26–33)
MCHC RBC AUTO-ENTMCNC: 33.1 G/DL (ref 31–36)
MCV RBC AUTO: 113.1 FL (ref 78–100)
MISC.: ABNORMAL
MONOCYTES NFR BLD AUTO: 10.4 %
NITRITE UR QL STRIP: ABNORMAL
PH UR STRIP: 5 PH (ref 5–7)
PLATELET COUNT - QUEST: 160 10^9/L (ref 150–375)
POTASSIUM SERPL-SCNC: 3.8 MMOL/L (ref 3.5–5.3)
PROT SERPL-MCNC: 6.2 G/DL (ref 6.1–8.1)
RBC # BLD AUTO: 3.58 10*12/L (ref 4.4–5.9)
RBC, UR MICRO: ABNORMAL (ref ?–2)
SODIUM SERPL-SCNC: 141.5 MMOL/L (ref 135–146)
SP. GRAVITY: >=1.03
UROBILINOGEN UR QL STRIP: 0.2 EU/DL (ref 0.2–1)
WBC # BLD AUTO: 4.3 10*9/L (ref 4–11)
WBC, UR MICRO: ABNORMAL (ref ?–2)

## 2020-06-17 PROCEDURE — 80053 COMPREHEN METABOLIC PANEL: CPT | Performed by: FAMILY MEDICINE

## 2020-06-17 PROCEDURE — 85025 COMPLETE CBC W/AUTO DIFF WBC: CPT | Performed by: FAMILY MEDICINE

## 2020-06-17 PROCEDURE — 81001 URINALYSIS AUTO W/SCOPE: CPT | Performed by: FAMILY MEDICINE

## 2020-06-17 PROCEDURE — 93000 ELECTROCARDIOGRAM COMPLETE: CPT | Performed by: FAMILY MEDICINE

## 2020-06-17 PROCEDURE — 99203 OFFICE O/P NEW LOW 30 MIN: CPT | Performed by: FAMILY MEDICINE

## 2020-06-17 RX ORDER — CIPROFLOXACIN 500 MG/1
500 TABLET, FILM COATED ORAL 2 TIMES DAILY
Qty: 20 TABLET | Refills: 0 | Status: SHIPPED | OUTPATIENT
Start: 2020-06-17 | End: 2020-08-05

## 2020-06-17 SDOH — HEALTH STABILITY: MENTAL HEALTH: HOW MANY STANDARD DRINKS CONTAINING ALCOHOL DO YOU HAVE ON A TYPICAL DAY?: 1 OR 2

## 2020-06-17 SDOH — HEALTH STABILITY: MENTAL HEALTH: HOW OFTEN DO YOU HAVE A DRINK CONTAINING ALCOHOL?: 4 OR MORE TIMES A WEEK

## 2020-06-17 ASSESSMENT — MIFFLIN-ST. JEOR: SCORE: 1508.06

## 2020-06-17 NOTE — NURSING NOTE
Armen is here to discuss memory loss, some other health issues shaking today    Pre-visit Screening:  Immunizations:  up to date  Colonoscopy:  NA d/t age  Mammogram: NA  Asthma Action Test/Plan:  NA  PHQ9:  None  GAD7:  None  Questioned patient about current smoking habits Pt. quit smoking some time ago.  Ok to leave detailed message on voice mail for today's visit only Yes, phone # [phone]

## 2020-06-17 NOTE — LETTER
[facility]  [address]  [address]  [facility] [address]  [phone]      June 17, 2020      Armen MADHAVI Pehlps  [address]  [facility] [address]      EMERGENCY CARE PLAN  Presenting Problem Treatment Plan   Questions or concerns during clinic hours I will call the clinic directly:    [facility]  [address] , [address]  [place] [address] [phone]   Questions or concerns outside clinic hours  I will call the 24 hour line at [phone]   Patient needs to schedule an appointment  I will call the  scheduling line at [phone]   Same day treatment   I will call the clinic first, then  urgent care and/or  express care if needed   Clinic Care Coordinators Ira Aponte RN:  [phone]  [facility] Clinical Support Staff: [phone]    Crisis Services:  Behavioral or Mental Health [facility] ([facility] Providers)   [phone]   Emergency treatment--Immediately CALL [phone]

## 2020-06-17 NOTE — PROGRESS NOTES
Subjective     Armen Phelps is a 75 year old male who presents to clinic today for the following health issues:    HPI   Hyperlipidemia Follow-Up      Are you regularly taking any medication or supplement to lower your cholesterol?   Yes- zocor    Are you having muscle aches or other side effects that you think could be caused by your cholesterol lowering medication?  No    Hypertension Follow-up      Do you check your blood pressure regularly outside of the clinic? No     Are you following a low salt diet? Yes    Are your blood pressures ever more than 140 on the top number (systolic) OR more   than 90 on the bottom number (diastolic), for example 140/90? No      How many servings of fruits and vegetables do you eat daily?  2-3    On average, how many sweetened beverages do you drink each day (Examples: soda, juice, sweet tea, etc.  Do NOT count diet or artificially sweetened beverages)?   1    How many days per week do you exercise enough to make your heart beat faster? 6    How many minutes a day do you exercise enough to make your heart beat faster? 30 - 60    How many days per week do you miss taking your medication? 0    Feels shakey today denies fever or chills  Denies dysuria or freq    Patient Active Problem List   Diagnosis     Sciatica     Thoracic or lumbosacral neuritis or radiculitis, unspecified     HNP (herniated nucleus pulposus), lumbar     Mild alcohol dependence (H)     Spinal stenosis of lumbar region     Health Care Home     ACP (advance care planning)     Past Surgical History:   Procedure Laterality Date     BILATERAL LUMBAR LAMINECTOMY L2-3 and L3-4 WITH LEFT DISCECTOMY AND EXCISION OF LEFT CYST AT L5-S1  12/2013     CHOLECYSTECTOMY  1997       Social History     Tobacco Use     Smoking status: Former Smoker     Smokeless tobacco: Never Used   Substance Use Topics     Alcohol use: Yes     Frequency: 4 or more times a week     Drinks per session: 1 or 2     Comment: daily 2 wine     History  "reviewed. No pertinent family history.      Current Outpatient Medications   Medication Sig Dispense Refill     acetaminophen (TYLENOL) 325 MG tablet Take 325-650 mg by mouth every 6 hours as needed for mild pain       lisinopril-hydrochlorothiazide (PRINZIDE/ZESTORETIC) 20-12.5 MG tablet Take 1 tablet by mouth every evening       pantoprazole (PROTONIX) 40 MG EC tablet Take 40 mg by mouth every evening        simvastatin (ZOCOR) 80 MG tablet Take 80 mg by mouth every evening        No Known Allergies      Reviewed and updated as needed this visit by Provider         Review of Systems   Constitutional, HEENT, cardiovascular, pulmonary, gi and gu systems are negative, except as otherwise noted.      Objective    BP (!) 162/80 (BP Location: Right arm, Patient Position: Sitting, Cuff Size: Adult Large)   Pulse 105   Temp 98.3  F (36.8  C) (Oral)   Ht 1.715 m (5' 7.5\")   Wt 80.6 kg (177 lb 12.8 oz)   SpO2 95%   BMI 27.44 kg/m    Body mass index is 27.44 kg/m .  Physical Exam   GENERAL: healthy, alert and no distress  NECK: no adenopathy, no asymmetry, masses, or scars and thyroid normal to palpation  RESP: lungs clear to auscultation - no rales, rhonchi or wheezes  CV: tachycardia, normal S1 S2, no S3 or S4, no murmur, click or rub, peripheral pulses strong and no peripheral edema  ABDOMEN: soft, nontender, no hepatosplenomegaly, no masses and bowel sounds normal  MS: no gross musculoskeletal defects noted, no edema  LYMPH: no cervical, supraclavicular, axillary, or inguinal adenopathy    Diagnostic Test Results:  Labs reviewed in Epic  Results for orders placed or performed in visit on 06/17/20 (from the past 24 hour(s))   HCL  Urinalysis, Routine (BFP)   Result Value Ref Range    Color Urine Paty (A)     Appearance Urine Slightly Cloudy (A)     Glucose Urine Neg neg mg/dL    Bilirubin Urine Neg neg    Ketones Urine Neg neg mg/dL    Specific Gravity >=1.030 (A)     Blood Urine Neg neg    pH Urine 5.0 5.0 - 7.0 " "pH    Albumin Urine trace (A) neg - neg mg/dL    Urobilinogen Urine 0.2 0.2 - 1.0 EU/dL    Nitrite Urine Neg NEG    Leukocyte Esterase neg neg - neg    Wbc, Urine Micro 0-1 neg - 2    RBC Micro Urine neg neg - 2    EP/HPF neg     Bacteria Urine moderate (A) neg - neg    Casts/LPF neg     Miscellaneous mucous strands-large (A)    CL AFF HEMOGRAM/PLATE/DIFF (BFP)   Result Value Ref Range    WBC 4.3 4.0 - 11 10*9/L    RBC Count 3.58 (A) 4.4 - 5.9 10*12/L    Hemoglobin 13.4 13.3 - 17.7 g/dL    Hematocrit 40.5 40.0 - 53.0 %    .1 (A) 78 - 100 fL    MCH 37.4 (A) 26 - 33 pg    MCHC 33.1 31 - 36 g/dL    Platelet Count 160 150 - 375 10^9/L    % Granulocytes 71.3 %    % Lymphocytes 18.3 %    % Monocytes 10.4 %    Narrative    Ran twice.            Assessment & Plan     1. Tachycardia  Possible related to uti  - TSH with free T4 reflex  - EKG 12-lead complete w/read - Clinics  - Comprehensive Metobolic Panel (BFP)    2. Health Care Home      3. ACP (advance care planning)      4. Tremor  Related to uti?  - HCL  Urinalysis, Routine (BFP)  - CL AFF HEMOGRAM/PLATE/DIFF (BFP)  - Comprehensive Metobolic Panel (BFP)    5. Essential hypertension    - Comprehensive Metobolic Panel (BFP)    6. Hereditary hemochromatosis (H)    - Comprehensive Metobolic Panel (BFP)  - IRON ([facility])    7. Dysuria    - Urine Culture Aerobic Bacterial  - ciprofloxacin (CIPRO) 500 MG tablet; Take 1 tablet (500 mg) by mouth 2 times daily  Dispense: 20 tablet; Refill: 0     BMI:   Estimated body mass index is 27.44 kg/m  as calculated from the following:    Height as of this encounter: 1.715 m (5' 7.5\").    Weight as of this encounter: 80.6 kg (177 lb 12.8 oz).           MEDICATIONS:  Continue current medications without change    No follow-ups on file.    Jeff Trivedi MD  [facility]        "

## 2020-06-17 NOTE — PROGRESS NOTES
Cognitive Screen  Patient repeats three objects (ball, flag, tree)      Clock drawing test:   NORMAL  Recalls three objects after 3 minutes (ball,flag,tree):                                                                                               recalls 2 objects (2 points) missed flag

## 2020-06-18 LAB
IRON: 167 MCG/DL (ref 50–180)
TSH SERPL-ACNC: 1.54 MIU/L (ref 0.4–4.5)

## 2020-06-19 LAB — URINE VOIDED CULTURE: NORMAL

## 2020-07-08 DIAGNOSIS — E83.110 HEREDITARY HEMOCHROMATOSIS (H): Primary | ICD-10-CM

## 2020-07-08 RX ORDER — ONDANSETRON 4 MG/1
4 TABLET, ORALLY DISINTEGRATING ORAL EVERY 8 HOURS PRN
Qty: 90 TABLET | Refills: 3 | Status: SHIPPED | OUTPATIENT
Start: 2020-07-08 | End: 2021-04-12

## 2020-07-08 NOTE — TELEPHONE ENCOUNTER
Armen's wife Cam called to say that he needs a new Rx for his ondansetron for his stomach issues called to [facility].    Pending Prescriptions:                       Disp   Refills    ondansetron (ZOFRAN-ODT) 4 MG ODT tab     90 tab*3            Sig: Take 1 tablet (4 mg) by mouth every 8 hours as           needed for nausea    Patient's phone #[phone]

## 2020-08-05 ENCOUNTER — OFFICE VISIT (OUTPATIENT)
Dept: FAMILY MEDICINE | Facility: CLINIC | Age: 75
End: 2020-08-05

## 2020-08-05 VITALS — SYSTOLIC BLOOD PRESSURE: 136 MMHG | DIASTOLIC BLOOD PRESSURE: 78 MMHG

## 2020-08-05 DIAGNOSIS — R73.9 HYPERGLYCEMIA: Primary | ICD-10-CM

## 2020-08-05 DIAGNOSIS — R29.6 FALLS FREQUENTLY: ICD-10-CM

## 2020-08-05 LAB — HBA1C MFR BLD: 4.9 % (ref 4–7)

## 2020-08-05 PROCEDURE — 36415 COLL VENOUS BLD VENIPUNCTURE: CPT | Performed by: FAMILY MEDICINE

## 2020-08-05 PROCEDURE — 99214 OFFICE O/P EST MOD 30 MIN: CPT | Performed by: FAMILY MEDICINE

## 2020-08-05 PROCEDURE — 83036 HEMOGLOBIN GLYCOSYLATED A1C: CPT | Performed by: FAMILY MEDICINE

## 2020-08-05 RX ORDER — ONDANSETRON 4 MG/1
TABLET, FILM COATED ORAL
COMMUNITY
Start: 2020-07-09 | End: 2020-08-05

## 2020-08-05 NOTE — NURSING NOTE
Chief Complaint   Patient presents with     Balance/ Vestibular     has been falling a lot lately, losing balance, shakiness     Memory Loss     has been having memory issues     Pre-visit Screening:  Immunizations:  up to date  Colonoscopy:  is up to date  Mammogram: NA  Asthma Action Test/Plan:  NA  PHQ9:  NA  GAD7:  NA  Questioned patient about current smoking habits Pt. quit smoking some time ago.  Ok to leave detailed message on voice mail for today's visit only Yes, phone #   [phone]      Cognitive Screen  Patient repeats three objects (ball, flag, tree)      Clock drawing test:   NORMAL  Recalls three objects after 3 minutes (ball,flag,tree):                                                                                               recalls 2 objects (2 points)    Recalled ball tree but no flag

## 2020-08-05 NOTE — PROGRESS NOTES
Subjective:   Armen Phelps is a 75 year old male with hypertension.  Current Outpatient Medications   Medication Sig Dispense Refill     acetaminophen (TYLENOL) 325 MG tablet Take 325-650 mg by mouth every 6 hours as needed for mild pain       lisinopril-hydrochlorothiazide (PRINZIDE/ZESTORETIC) 20-12.5 MG tablet Take 1 tablet by mouth every evening       pantoprazole (PROTONIX) 40 MG EC tablet Take 40 mg by mouth every evening        simvastatin (ZOCOR) 80 MG tablet Take 80 mg by mouth every evening        ondansetron (ZOFRAN-ODT) 4 MG ODT tab Take 1 tablet (4 mg) by mouth every 8 hours as needed for nausea (Patient not taking: Reported on 8/5/2020) 90 tablet 3      Hypertension ROS: taking medications as instructed, no medication side effects noted, no TIA's, no chest pain on exertion, no dyspnea on exertion, no swelling of ankles.   New concerns: frequent falls especially at night on steps no head trauma.   Wife says etoh may be involved   Drinks aigloria aft goes to bed early and wakes up at 3AM and then is at risk for falls  Objective:   /78    Appearance healthy, alert and cooperative.  General exam BP noted to be well controlled today in office, S1, S2 normal, no gallop, no murmur, chest clear, no JVD, no HSM, no edema.   Neuro gait nml Mental status nml  Lab review: labs reviewed, I note that elevated glucose  This SmartLink has not been configured with any valid records.     Recent Results (from the past 24 hour(s))   Hemoglobin A1c (BFP)    Collection Time: 08/05/20  3:20 PM   Result Value Ref Range    Hemoglobin A1C 4.9 4.0 - 7.0 %       .     (R73.9) Hyperglycemia  (primary encounter diagnosis)  Comment:   Plan: VENOUS COLLECTION, Hemoglobin A1c (BFP),         CANCELED: Hemoglobin A1c        No evidence of diabetes    (R29.6) Falls frequently  Comment:   Plan: PHYSICAL THERAPY REFERRAL  D/W wife and pt etoh use and risk

## 2020-08-11 ENCOUNTER — TELEPHONE (OUTPATIENT)
Dept: FAMILY MEDICINE | Facility: CLINIC | Age: 75
End: 2020-08-11

## 2020-08-11 NOTE — TELEPHONE ENCOUNTER
Pt's wife called stating her  has decided not to go forward with PT. She just wanted to update you.     Thanks, Laxmi

## 2020-08-18 ENCOUNTER — APPOINTMENT (OUTPATIENT)
Dept: CT IMAGING | Facility: CLINIC | Age: 75
End: 2020-08-18
Attending: EMERGENCY MEDICINE
Payer: COMMERCIAL

## 2020-08-18 ENCOUNTER — HOSPITAL ENCOUNTER (EMERGENCY)
Facility: CLINIC | Age: 75
Discharge: HOME | End: 2020-08-19
Attending: EMERGENCY MEDICINE | Admitting: EMERGENCY MEDICINE
Payer: COMMERCIAL
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
## 2020-08-18 DIAGNOSIS — F10.920 ALCOHOLIC INTOXICATION WITHOUT COMPLICATION (H): ICD-10-CM

## 2020-08-18 DIAGNOSIS — S09.90XA TRAUMATIC INJURY OF HEAD, INITIAL ENCOUNTER: ICD-10-CM

## 2020-08-18 PROCEDURE — 99284 EMERGENCY DEPT VISIT MOD MDM: CPT | Mod: 25

## 2020-08-18 PROCEDURE — 82075 ASSAY OF BREATH ETHANOL: CPT

## 2020-08-18 PROCEDURE — 70450 CT HEAD/BRAIN W/O DYE: CPT

## 2020-08-18 PROCEDURE — 72125 CT NECK SPINE W/O DYE: CPT

## 2020-08-18 NOTE — ED AVS SNAPSHOT
[facility] Emergency Department  [address]  [facility] [address]  Phone:  [phone]  Fax:  [phone]                                    Armen Phelps   MRN: [id number]    Department:  [facility] Emergency Department   Date of Visit:  8/18/2020           After Visit Summary Signature Page    I have received my discharge instructions, and my questions have been answered. I have discussed any challenges I see with this plan with the nurse or doctor.    ..........................................................................................................................................  Patient/Patient Representative Signature      ..........................................................................................................................................  Patient Representative Print Name and Relationship to Patient    ..................................................               ................................................  Date                                   Time    ..........................................................................................................................................  Reviewed by Signature/Title    ...................................................              ..............................................  Date                                               Time          22EPIC Rev 08/18

## 2020-08-19 VITALS
TEMPERATURE: 99 F | DIASTOLIC BLOOD PRESSURE: 102 MMHG | OXYGEN SATURATION: 96 % | SYSTOLIC BLOOD PRESSURE: 160 MMHG | HEART RATE: 78 BPM | RESPIRATION RATE: 16 BRPM

## 2020-08-19 LAB — ALCOHOL BREATH TEST: 0.18 (ref 0–0.01)

## 2020-08-19 PROCEDURE — 25000132 ZZH RX MED GY IP 250 OP 250 PS 637: Performed by: EMERGENCY MEDICINE

## 2020-08-19 RX ORDER — OLANZAPINE 5 MG/1
5 TABLET, ORALLY DISINTEGRATING ORAL ONCE
Status: COMPLETED | OUTPATIENT
Start: 2020-08-19 | End: 2020-08-19

## 2020-08-19 RX ADMIN — OLANZAPINE 5 MG: 5 TABLET, ORALLY DISINTEGRATING ORAL at 01:04

## 2020-08-19 NOTE — ED TRIAGE NOTES
Multiple falls, dementia, wife came home after being gone all day and found him naked in the bathroom, unknown how or when he got there. Pt verbally beligerent and pulling away with cares. Multiple bruises in different stages on body from multiple falls.    Wife does not want him to come home, and felt also mentioned that she felt like she was mistreated when she came to pick him up last time.    Pt yelling out upon arrival, seems to be redirectable and allowed us to complete vital signs.

## 2020-08-19 NOTE — ED NOTES
I called pt's wife Cam w/ care update. She verbalized understanding and reports she is able to provide a sober ride for patient. Will update MD. She will arrive in 15 minutes.

## 2020-08-19 NOTE — ED NOTES
Pt coming out of room again. Pt informed of plan of care again. Pt verbalized understanding and said to try to call his wife around 0530 as she may be up by then.

## 2020-08-19 NOTE — ED NOTES
Pt continually coming out of room requesting to call wife. Pt assisted in calling wife. Pt was able to speak w/ wife using hospital phone.

## 2020-08-19 NOTE — ED NOTES
Pt repeatedly coming out of his room appearing disoriented. Pt wondering where his wife is, pt reminded that it is almost 0100 and that his wife is likely sleeping at this time. Pt reoriented and assisted back to bed.

## 2020-08-19 NOTE — ED PROVIDER NOTES
History     Chief Complaint:  Alcohol Intoxication    HPI   Armen Phelps is a 75 year old male with past medical history of early dementia and alcohol abuse who presents with falls and alcohol intoxication. Per nursing report patient has had multiple falls at home today. He has early dementia and is not able to provide adequate history. Wife came home after being gone all day and found him naked in the bathroom, unknown how or when he got there. Pt verbally beligerent and pulling away with cares. Multiple bruises in different stages on body from multiple falls. Wife does not want him to come home, and felt also mentioned that she felt like she was mistreated when she came to pick him up last time.     Allergies:  No known drug allergies    Medications:    Lisinopril-hydrochlorothiazide  Zofran  Protonix  Simvastatin     Past Medical History:    GERD  HTN  Spinal stenosis of lumbar region  Sciatica  HNP, lumbar  Mild alcohol dependence    Past Surgical History:    Bilateral lumbar laminectomy L2-3 and L3-4 with left discectomy and excision of left cyst at L5S1  Cholecystectomy    Family History:    History reviewed. No pertinent family history.     Social History:  Smoking status: former smoker  Alcohol use: yes  Drug use: no  The patient presents to the emergency department via EMS.  PCP: Jeff Trivedi  Marital Status:       Review of Systems   Unable to perform ROS: Mental status change     Physical Exam     Patient Vitals for the past 24 hrs:   BP Temp Temp src Pulse Heart Rate Resp SpO2   08/19/20 0410 -- -- -- -- -- -- 96 %   08/19/20 0400 (!) 160/102 -- -- 78 -- -- 96 %   08/19/20 0340 (!) 156/109 -- -- 83 -- -- 95 %   08/19/20 0320 (!) 148/106 -- -- 90 -- -- 95 %   08/19/20 0300 (!) 145/102 -- -- 88 -- -- 97 %   08/19/20 0015 -- -- -- -- -- -- 94 %   08/19/20 0000 -- -- -- 95 -- -- 96 %   08/18/20 2330 (!) 153/96 -- -- 98 -- -- 97 %   08/18/20 2325 (!) 154/104 -- -- -- -- -- 97 %   08/18/20  "2220 (!) 149/100 -- -- -- -- -- --   08/18/20 2203 (!) 168/101 99  F (37.2  C) Oral 98 98 16 96 %       Physical Exam  General: sleeping but wakes to voice   Head: small abrasion to the left sleep of the head   Eyes: The pupils are equal, round, and reactive to light. Conjunctivae and sclerae are normal  ENT: No hemotympanum or signs basilar skull fracture. The oropharynx is normal without erythema. No tenderness to palpation of the face, nose or jaw.   Neck: Normal range of motion. No cervical midline tenderness.   CV: Regular rate. S1/S2. No murmurs.   Resp: Lungs are clear without wheezes or rales. No distress. No crepitance.   GI: Abdomen is soft, no rigidity, guarding, or rebound. No contusion. No distension. No tenderness to palpation in any quadrant.     MS: Normal tone. Joints grossly normal without effusions. No asymmetric leg swelling, calf or thigh tenderness. Normal motor assessment of all extremities. PROM performed of all major joints without pain. No C/T/L tenderness in midline  Skin: ecchymosis scattered over arms and legs in various stages of healing. Normal capillary refill noted  Neuro: GCS 15.  CN\"s II-XII intact. Speech is slurred. Face is symmetric. Strength is normal and symmetric.   Psych: labile     Emergency Department Course     Imaging:  Radiographic findings were communicated with the patient who voiced understanding of the findings.  CT Head without contrast:   1.  No acute intracranial abnormality or significant change compared to the prior study.     2.  Mild age-related changes are stable. as per radiology.    CT Cervical spine w/o contrast   1.  No acute cervical spine fracture or posttraumatic subluxation. as per radiology.    Laboratory:  Alcohol breath test POCT: 0.182    Interventions:  104 Zyprexa 5 mg Oral    Emergency Department Course:  Nursing notes and vitals reviewed. () I performed an exam of the patient as documented above.     Medicine administered as documented " above.    The patient was sent for CT while in the emergency department, findings above.     520 I rechecked the patient and discussed the results of his workup thus far.     Findings and plan explained to the Patient. Patient discharged home with instructions regarding supportive care, medications, and reasons to return. The importance of close follow-up was reviewed.     I personally reviewed the laboratory results with the Patient and answered all related questions prior to discharge.      Impression & Plan      Medical Decision Making:  Armen Phelps is a 75 year old male who presents for evaluation of altered mental status.  He is intoxicated here in ED and this is the most likely etiology for their AMS. However he does admit to multiple falls recently and one this evening where he stuck his head. CT scan was obtained and was negative for acute traumatic pathology. Neuro exam intake. He does have scattered bruising over his arms and legs. No bony tenderness requiring X-rays. He has no history of DT's or alcohol withdrawal seizures. There are no history of co-ingestion including acetaminophen, drugs, medications, volatile alcohols. Supportive outpatient management is indicated as I do not believe there is a coingestion nor do I anticipate deterioration from their current level of mentation. He was ambulated and did well here in ED.  Patient has a sober ride.  Alcohol abuse precautions are given for home.        Diagnosis:    ICD-10-CM    1. Alcoholic intoxication without complication (H)  F10.920    2. Fall, initial encounter  W19.XXXA    3. Traumatic injury of head, initial encounter  S09.90XA        Disposition:  discharged to home  Wiley Uribe  8/18/2020   [facility] EMERGENCY DEPARTMENT  Scribe Disclosure:  I, Wiley Uribe, am serving as a scribe at 10:45 PM on 8/18/2020 to document services personally performed by Jono Figueroa MD based on my observations and the provider's statements  to me.        Jono Figueroa MD  08/19/20 0605

## 2020-08-19 NOTE — ED NOTES
Pt often coming out of his room wondering what plan of care is. Pt appears steady on his feet and says that he feels well enough to go home. Pt informed that plan for discharge will be to discharge when wife is available to take him home. Due to the early hour it may be a bit as she is sleeping. Verbalized understanding and was encouraged back into his room. Pt is attempting to rest at this time.

## 2020-10-06 ENCOUNTER — TELEPHONE (OUTPATIENT)
Dept: FAMILY MEDICINE | Facility: CLINIC | Age: 75
End: 2020-10-06

## 2020-10-06 NOTE — TELEPHONE ENCOUNTER
Pt called and left a message asking if he could stop taking his Simvastatin. Please Advise. Thanks

## 2020-10-06 NOTE — TELEPHONE ENCOUNTER
LM on VM informing pt of TAN message. I told him to call back to discuss why he wanted to stop taking it .

## 2020-10-06 NOTE — TELEPHONE ENCOUNTER
The decision is his but the simvistatin does decrease his risk for stroke and Heart attack. Is there a reason why he wants to stop this med? Dr MAURER

## 2020-10-07 ENCOUNTER — TELEPHONE (OUTPATIENT)
Dept: FAMILY MEDICINE | Facility: CLINIC | Age: 75
End: 2020-10-07

## 2020-10-07 NOTE — TELEPHONE ENCOUNTER
Obviously cannot d/w wife pt without permission  Usually taking liscense away is family decision usually they start with taking keys away Dr MAURER

## 2020-10-07 NOTE — TELEPHONE ENCOUNTER
Armen's wife, Cam, called and left a detailed voicemail regarding Armen's memory loss and treatment at Dr. Preston's office.    Armen has told his wife that he forgets how to get places and he is still driving and that concerns her. And possibly how to go about taking his license away.  The other concern was about his treatments with Dr. Preston for his very low iron levels because he has alcoholism.    Armen did not sign a PMI for his wife to have access to his medical information and for us to communicate with her.    I called back and left a detailed message informing Cam that we cannot disclose information and perhaps Armen comes in to sign a PMI and/or come in for an OV regarding his medication and memory loss.      Dr. Trivedi,  Do you have any thoughts about his driving license and if he should continue treatments at Dr. Preston's office.    Thanks, Yue      Cam's # [phone]  Armen's # [phone]

## 2020-10-23 ENCOUNTER — OFFICE VISIT (OUTPATIENT)
Dept: FAMILY MEDICINE | Facility: CLINIC | Age: 75
End: 2020-10-23

## 2020-10-23 VITALS
HEART RATE: 93 BPM | BODY MASS INDEX: 28.18 KG/M2 | OXYGEN SATURATION: 97 % | WEIGHT: 182.6 LBS | DIASTOLIC BLOOD PRESSURE: 80 MMHG | TEMPERATURE: 98.7 F | SYSTOLIC BLOOD PRESSURE: 128 MMHG

## 2020-10-23 DIAGNOSIS — R41.0 CONFUSION: Primary | ICD-10-CM

## 2020-10-23 DIAGNOSIS — R29.6 FALLS FREQUENTLY: ICD-10-CM

## 2020-10-23 DIAGNOSIS — F03.90 DEMENTIA WITHOUT BEHAVIORAL DISTURBANCE, UNSPECIFIED DEMENTIA TYPE: ICD-10-CM

## 2020-10-23 LAB
% GRANULOCYTES: 66.4 %
ALBUMIN (URINE): ABNORMAL MG/DL
APPEARANCE UR: CLEAR
BACTERIA, UR: ABNORMAL
BILIRUB UR QL: ABNORMAL
CASTS/LPF: ABNORMAL
COLOR UR: YELLOW
EP/HPF: ABNORMAL
GLUCOSE URINE: ABNORMAL MG/DL
HCT VFR BLD AUTO: 42.5 % (ref 40–53)
HEMOGLOBIN: 13.5 G/DL (ref 13.3–17.7)
HGB UR QL: ABNORMAL
KETONES UR QL: ABNORMAL MG/DL
LEUKOCYTE ESTERASE - QUEST: ABNORMAL
LYMPHOCYTES NFR BLD AUTO: 23.9 %
MCH RBC QN AUTO: 37.6 PG (ref 26–33)
MCHC RBC AUTO-ENTMCNC: 31.8 G/DL (ref 31–36)
MCV RBC AUTO: 118.4 FL (ref 78–100)
MISC.: ABNORMAL
MONOCYTES NFR BLD AUTO: 9.7 %
NITRITE UR QL STRIP: ABNORMAL
PH UR STRIP: 6 PH (ref 5–7)
PLATELET COUNT - QUEST: 152 10^9/L (ref 150–375)
RBC # BLD AUTO: 3.59 10*12/L (ref 4.4–5.9)
RBC, UR MICRO: ABNORMAL (ref ?–2)
SP. GRAVITY: >=1.03
UROBILINOGEN UR QL STRIP: 0.2 EU/DL (ref 0.2–1)
WBC # BLD AUTO: 4.9 10*9/L (ref 4–11)
WBC, UR MICRO: ABNORMAL (ref ?–2)

## 2020-10-23 PROCEDURE — 85025 COMPLETE CBC W/AUTO DIFF WBC: CPT | Performed by: FAMILY MEDICINE

## 2020-10-23 PROCEDURE — 81001 URINALYSIS AUTO W/SCOPE: CPT | Performed by: FAMILY MEDICINE

## 2020-10-23 PROCEDURE — 99214 OFFICE O/P EST MOD 30 MIN: CPT | Performed by: FAMILY MEDICINE

## 2020-10-23 RX ORDER — FOLIC ACID 1 MG/1
TABLET ORAL
COMMUNITY
Start: 2020-10-17 | End: 2021-07-15

## 2020-10-23 NOTE — PROGRESS NOTES
SUBJECTIVE:                                                      Patient presents for Hospital Followup Visit:    [facility]  Date of Admission: 10/16/2020  Date of Discharge: 10/17/2020  Transitional Care Management Phone Call:   Reason(s) for Admission:Intoxication             Problems taking medications regularly:  None       Medication changes since discharge: None       Problems adhering to non-medication therapy:  None    Summary of [facility]:  [facility] not [facility]  Diagnostic Tests/Treatments reviewed.  Follow up needed: MRI for confusion and head trauma   Other Healthcare Providers Involved in Patient s Care:         Imaging  Update since discharge: stable.     ROS:  Constitutional, HEENT, cardiovascular, pulmonary, gi and gu systems are negative, except as otherwise noted.    OBJECTIVE:                                                      GENERAL APPEARANCE: healthy, alert and no distress  EYES: Eyes grossly normal to inspection, PERRL and conjunctivae and sclerae normal  HENT: ear canals and TM's normal and nose and mouth without ulcers or lesions  NECK: no adenopathy, no asymmetry, masses, or scars and thyroid normal to palpation  RESP: lungs clear to auscultation - no rales, rhonchi or wheezes  CV: normal S1 S2, no S3 or S4, no murmur, click or rub, no irregular beats and borderline tachycardia  LYMPHATICS: no cervical adenopathy  ABDOMEN: soft, nontender, without hepatosplenomegaly or masses and bowel sounds normal  NEURO: Normal strength and tone, speech normal and memory loss  PSYCH: mentation appears normal and affect normal/bright  MENTAL STATUS EXAM:  Appearance/Behavior: No apparent distress  Speech: Normal  Mood/Affect: normal affect  Insight: Fair    ASSESSMENT/PLAN:                                                          Post Discharge Medication Reconciliation: discharge medications reconciled, continue medications without change.  Issues to address: Issues identified were:   alcohol over  use and memory loss.  Plan of care communicated with patient and family      Type of Medical Decision Making Face-to-Face Visit within 7 Days Face-to-Face Visit within 14 days   Moderate Complexity [id number]   High Complexity [id number]

## 2020-10-23 NOTE — NURSING NOTE
Armen is here to discuss his medications.    Pre-Visit Screening:  Immunizations:UTD  Colonoscopy:Needs  Mammogram:NA  Asthma Action Test/Plan:NA  PHQ9:NA  GAD7:NA  Questioned patient about current smoking habits Pt.former smoker  OK to leave a detailed message on voice mail for today's visit yes, phone # [phone]       

## 2020-10-26 DIAGNOSIS — R41.0 CONFUSION: ICD-10-CM

## 2020-10-26 DIAGNOSIS — F03.90 DEMENTIA WITHOUT BEHAVIORAL DISTURBANCE, UNSPECIFIED DEMENTIA TYPE: ICD-10-CM

## 2020-10-26 DIAGNOSIS — R29.6 FALLS FREQUENTLY: ICD-10-CM

## 2020-11-09 ENCOUNTER — TELEPHONE (OUTPATIENT)
Dept: FAMILY MEDICINE | Facility: CLINIC | Age: 75
End: 2020-11-09

## 2020-11-09 NOTE — TELEPHONE ENCOUNTER
Pt wife called to say pt has COVID. She was wondering what could be done about it. I informed her that there is nothing we can do, but if he has severe symptoms to go to ER.

## 2020-12-03 DIAGNOSIS — I10 ESSENTIAL HYPERTENSION: Primary | ICD-10-CM

## 2020-12-03 DIAGNOSIS — K21.00 GASTROESOPHAGEAL REFLUX DISEASE WITH ESOPHAGITIS, UNSPECIFIED WHETHER HEMORRHAGE: ICD-10-CM

## 2020-12-03 NOTE — TELEPHONE ENCOUNTER
Pt last seen 10/23/20. Pt's wife called he is in need of a few medications.    Armen Phelps is requesting a refill of:    Pending Prescriptions:                       Disp   Refills    pantoprazole (PROTONIX) 40 MG EC tablet   90 tab*1            Sig: Take 1 tablet (40 mg) by mouth every evening    lisinopril-hydrochlorothiazide (ZESTORETI*90 tab*1            Sig: Take 1 tablet by mouth every evening

## 2020-12-04 RX ORDER — PANTOPRAZOLE SODIUM 40 MG/1
40 TABLET, DELAYED RELEASE ORAL EVERY EVENING
Qty: 90 TABLET | Refills: 1 | Status: SHIPPED | OUTPATIENT
Start: 2020-12-04 | End: 2021-04-12

## 2020-12-04 RX ORDER — LISINOPRIL AND HYDROCHLOROTHIAZIDE 12.5; 2 MG/1; MG/1
1 TABLET ORAL EVERY EVENING
Qty: 90 TABLET | Refills: 1 | Status: SHIPPED | OUTPATIENT
Start: 2020-12-04 | End: 2021-04-12

## 2020-12-15 ENCOUNTER — OFFICE VISIT (OUTPATIENT)
Dept: FAMILY MEDICINE | Facility: CLINIC | Age: 75
End: 2020-12-15

## 2020-12-15 VITALS
SYSTOLIC BLOOD PRESSURE: 124 MMHG | OXYGEN SATURATION: 99 % | DIASTOLIC BLOOD PRESSURE: 70 MMHG | WEIGHT: 179.8 LBS | TEMPERATURE: 98.1 F | HEART RATE: 79 BPM | BODY MASS INDEX: 27.75 KG/M2

## 2020-12-15 DIAGNOSIS — F05 SUNDOWNING: Primary | ICD-10-CM

## 2020-12-15 PROCEDURE — 99213 OFFICE O/P EST LOW 20 MIN: CPT | Performed by: FAMILY MEDICINE

## 2020-12-15 RX ORDER — QUETIAPINE FUMARATE 25 MG/1
25 TABLET, FILM COATED ORAL 2 TIMES DAILY
Qty: 60 TABLET | Refills: 1 | Status: SHIPPED | OUTPATIENT
Start: 2020-12-15 | End: 2021-04-12

## 2020-12-15 ASSESSMENT — ANXIETY QUESTIONNAIRES
7. FEELING AFRAID AS IF SOMETHING AWFUL MIGHT HAPPEN: SEVERAL DAYS
3. WORRYING TOO MUCH ABOUT DIFFERENT THINGS: NOT AT ALL
2. NOT BEING ABLE TO STOP OR CONTROL WORRYING: NOT AT ALL
6. BECOMING EASILY ANNOYED OR IRRITABLE: SEVERAL DAYS
5. BEING SO RESTLESS THAT IT IS HARD TO SIT STILL: NOT AT ALL
1. FEELING NERVOUS, ANXIOUS, OR ON EDGE: MORE THAN HALF THE DAYS
IF YOU CHECKED OFF ANY PROBLEMS ON THIS QUESTIONNAIRE, HOW DIFFICULT HAVE THESE PROBLEMS MADE IT FOR YOU TO DO YOUR WORK, TAKE CARE OF THINGS AT HOME, OR GET ALONG WITH OTHER PEOPLE: SOMEWHAT DIFFICULT
GAD7 TOTAL SCORE: 4

## 2020-12-15 ASSESSMENT — PATIENT HEALTH QUESTIONNAIRE - PHQ9
5. POOR APPETITE OR OVEREATING: NOT AT ALL
SUM OF ALL RESPONSES TO PHQ QUESTIONS 1-9: 11

## 2020-12-15 NOTE — NURSING NOTE
Armen is here to discuss anxiety issues at bedtime.    Pre-Visit Screening:  Immunizations:UTD  Colonoscopy:Needs  Mammogram:NA  Asthma Action Test/Plan:Na  PHQ9:today  GAD7:today  Questioned patient about current smoking habits Pt.former smoker  OK to leave a detailed message on voice mail for today's visit yes, phone #   [phone]

## 2020-12-15 NOTE — PROGRESS NOTES
SUBJECTIVE:  Pt with known dementia is starting to have increasing confusion in the evening  Starts going to bed around 5:30 but probably falls a sleep at 8:30  will wake up 1/2 hour to 1 hour after complaining he cant breath though O2 sats and pulse are nml  Also a little more confused at night     Patient Active Problem List   Diagnosis     Sciatica     Thoracic or lumbosacral neuritis or radiculitis, unspecified     HNP (herniated nucleus pulposus), lumbar     Mild alcohol dependence (H)     Spinal stenosis of lumbar region     Health Care Home     ACP (advance care planning)     Current Outpatient Medications   Medication Instructions     acetaminophen (TYLENOL) 325-650 mg, Oral, EVERY 6 HOURS PRN     folic acid (FOLVITE) 1 MG tablet TAKE ONE TABLET BY MOUTH EVERY DAY FOR FOLIC ACID SUPPLEMENTATION     lisinopril-hydrochlorothiazide (ZESTORETIC) 20-12.5 MG tablet 1 tablet, Oral, EVERY EVENING     ondansetron (ZOFRAN-ODT) 4 mg, Oral, EVERY 8 HOURS PRN     pantoprazole (PROTONIX) 40 mg, Oral, EVERY EVENING     QUEtiapine (SEROQUEL) 25 mg, Oral, 2 TIMES DAILY     simvastatin (ZOCOR) 80 mg, Oral, EVERY EVENING      ROS: 10 point ROS neg other than the symptoms noted above in the HPI.  Has had some increasing difficulty with driving     /70 (BP Location: Right arm, Patient Position: Sitting, Cuff Size: Adult Large)   Pulse 79   Temp 98.1  F (36.7  C) (Oral)   Wt 81.6 kg (179 lb 12.8 oz)   SpO2 99%   BMI 27.75 kg/m    GENERAL: no apparent distress  EYES: Conjunctiva are not injected, no discharge.  EARS: Left TM -no erythema, no effusion,  not bulged.               Right TM -no erythema, no effusion,  not bulged.  NOSE: no discharge, no sinus tenderness  THROAT: no erythema, no exudate, no lesions  NECK: supple, no adenopathy.  CARDIAC: regular rate and rhythm, no murmur  RESP: clear, no wheezing, no rales, no rhonchi  ABD: soft, no distension, no tenderness    (F05) Sundowning  (primary encounter  diagnosis)  Comment: dementia underlying  Start with first dose at 8pm or so start with 1/2 tablet   Discussed meds and use  Plan: QUEtiapine (SEROQUEL) 25 MG tablet        Recommend stop pt from driving      SKIN: No rashes

## 2020-12-16 ASSESSMENT — ANXIETY QUESTIONNAIRES: GAD7 TOTAL SCORE: 4

## 2021-01-11 RX ORDER — SIMVASTATIN 80 MG
80 TABLET ORAL EVERY EVENING
Qty: 90 TABLET | Status: CANCELLED | OUTPATIENT
Start: 2021-01-11

## 2021-01-11 NOTE — TELEPHONE ENCOUNTER
Wife called wondering how much medication pt should have left. Informed when it was sent in and refills.

## 2021-04-12 ENCOUNTER — OFFICE VISIT (OUTPATIENT)
Dept: FAMILY MEDICINE | Facility: CLINIC | Age: 76
End: 2021-04-12

## 2021-04-12 ENCOUNTER — TELEPHONE (OUTPATIENT)
Dept: FAMILY MEDICINE | Facility: CLINIC | Age: 76
End: 2021-04-12

## 2021-04-12 VITALS
HEART RATE: 68 BPM | SYSTOLIC BLOOD PRESSURE: 128 MMHG | BODY MASS INDEX: 26.85 KG/M2 | RESPIRATION RATE: 20 BRPM | TEMPERATURE: 97.7 F | WEIGHT: 174 LBS | DIASTOLIC BLOOD PRESSURE: 72 MMHG

## 2021-04-12 DIAGNOSIS — R21 RASH: ICD-10-CM

## 2021-04-12 DIAGNOSIS — F03.90 DEMENTIA WITHOUT BEHAVIORAL DISTURBANCE, UNSPECIFIED DEMENTIA TYPE: ICD-10-CM

## 2021-04-12 DIAGNOSIS — I10 ESSENTIAL HYPERTENSION: Primary | ICD-10-CM

## 2021-04-12 DIAGNOSIS — K21.00 GASTROESOPHAGEAL REFLUX DISEASE WITH ESOPHAGITIS, UNSPECIFIED WHETHER HEMORRHAGE: ICD-10-CM

## 2021-04-12 DIAGNOSIS — R79.89 ELEVATED SERUM CREATININE: Primary | ICD-10-CM

## 2021-04-12 DIAGNOSIS — E78.2 MIXED HYPERLIPIDEMIA: ICD-10-CM

## 2021-04-12 DIAGNOSIS — E83.110 HEREDITARY HEMOCHROMATOSIS (H): ICD-10-CM

## 2021-04-12 DIAGNOSIS — F05 SUNDOWNING: ICD-10-CM

## 2021-04-12 LAB
BUN SERPL-MCNC: 11 MG/DL (ref 7–25)
BUN/CREATININE RATIO: 8 (ref 6–22)
CALCIUM SERPL-MCNC: 8.9 MG/DL (ref 8.6–10.3)
CHLORIDE SERPLBLD-SCNC: 92.9 MMOL/L (ref 98–110)
CO2 SERPL-SCNC: 28.6 MMOL/L (ref 20–32)
CREAT SERPL-MCNC: 1.37 MG/DL (ref 0.6–1.3)
GLUCOSE SERPL-MCNC: 145 MG/DL (ref 60–99)
POTASSIUM SERPL-SCNC: 3.73 MMOL/L (ref 3.5–5.3)
SODIUM SERPL-SCNC: 128.6 MMOL/L (ref 135–146)

## 2021-04-12 PROCEDURE — 36415 COLL VENOUS BLD VENIPUNCTURE: CPT | Performed by: FAMILY MEDICINE

## 2021-04-12 PROCEDURE — 99214 OFFICE O/P EST MOD 30 MIN: CPT | Performed by: FAMILY MEDICINE

## 2021-04-12 PROCEDURE — 80048 BASIC METABOLIC PNL TOTAL CA: CPT | Performed by: FAMILY MEDICINE

## 2021-04-12 RX ORDER — SIMVASTATIN 80 MG
80 TABLET ORAL EVERY EVENING
Qty: 90 TABLET | Refills: 1 | Status: ON HOLD | OUTPATIENT
Start: 2021-04-12 | End: 2021-10-05

## 2021-04-12 RX ORDER — TRIAMCINOLONE ACETONIDE 1 MG/G
CREAM TOPICAL 2 TIMES DAILY
Qty: 30 G | Refills: 0 | Status: SHIPPED | OUTPATIENT
Start: 2021-04-12 | End: 2021-07-15

## 2021-04-12 RX ORDER — CYANOCOBALAMIN 1000 UG/ML
1 INJECTION, SOLUTION INTRAMUSCULAR; SUBCUTANEOUS
COMMUNITY
Start: 2021-04-12 | End: 2022-02-14 | Stop reason: DRUGHIGH

## 2021-04-12 RX ORDER — QUETIAPINE FUMARATE 25 MG/1
25 TABLET, FILM COATED ORAL AT BEDTIME
Qty: 90 TABLET | Refills: 1 | Status: SHIPPED | OUTPATIENT
Start: 2021-04-12 | End: 2021-12-09

## 2021-04-12 RX ORDER — TESTOSTERONE CYPIONATE 1000 MG/10ML
50 INJECTION, SOLUTION INTRAMUSCULAR
COMMUNITY
Start: 2021-04-12 | End: 2022-02-14

## 2021-04-12 RX ORDER — PANTOPRAZOLE SODIUM 40 MG/1
40 TABLET, DELAYED RELEASE ORAL EVERY EVENING
Qty: 90 TABLET | Refills: 1 | Status: SHIPPED | OUTPATIENT
Start: 2021-04-12 | End: 2021-12-13

## 2021-04-12 RX ORDER — ONDANSETRON 4 MG/1
4 TABLET, ORALLY DISINTEGRATING ORAL EVERY 8 HOURS PRN
Qty: 90 TABLET | Refills: 3 | Status: SHIPPED | OUTPATIENT
Start: 2021-04-12 | End: 2021-11-24

## 2021-04-12 RX ORDER — LISINOPRIL AND HYDROCHLOROTHIAZIDE 12.5; 2 MG/1; MG/1
1 TABLET ORAL EVERY EVENING
Qty: 90 TABLET | Refills: 1 | Status: SHIPPED | OUTPATIENT
Start: 2021-04-12 | End: 2021-05-06 | Stop reason: SINTOL

## 2021-04-12 ASSESSMENT — ANXIETY QUESTIONNAIRES
5. BEING SO RESTLESS THAT IT IS HARD TO SIT STILL: NOT AT ALL
IF YOU CHECKED OFF ANY PROBLEMS ON THIS QUESTIONNAIRE, HOW DIFFICULT HAVE THESE PROBLEMS MADE IT FOR YOU TO DO YOUR WORK, TAKE CARE OF THINGS AT HOME, OR GET ALONG WITH OTHER PEOPLE: NOT DIFFICULT AT ALL
2. NOT BEING ABLE TO STOP OR CONTROL WORRYING: NOT AT ALL
1. FEELING NERVOUS, ANXIOUS, OR ON EDGE: NOT AT ALL
GAD7 TOTAL SCORE: 0
6. BECOMING EASILY ANNOYED OR IRRITABLE: NOT AT ALL
7. FEELING AFRAID AS IF SOMETHING AWFUL MIGHT HAPPEN: NOT AT ALL
3. WORRYING TOO MUCH ABOUT DIFFERENT THINGS: NOT AT ALL

## 2021-04-12 ASSESSMENT — PATIENT HEALTH QUESTIONNAIRE - PHQ9
SUM OF ALL RESPONSES TO PHQ QUESTIONS 1-9: 0
5. POOR APPETITE OR OVEREATING: NOT AT ALL

## 2021-04-12 NOTE — TELEPHONE ENCOUNTER
D/w pt- creatinine up, should not be from serequel prescribed by TAN- recheck 2 weeks, push fluids

## 2021-04-12 NOTE — NURSING NOTE
Armen Phelps is here for a rash on legs, loose stool at times.  Questioned patient about current smoking habits.  Pt. quit smoking some time ago.  PULSE regular  My Chart:   CLASSIFICATION OF OVERWEIGHT AND OBESITY BY BMI                        Obesity Class           BMI(kg/m2)  Underweight                                    < 18.5  Normal                                         18.5-24.9  Overweight                                     25.0-29.9  OBESITY                     I                  30.0-34.9                             II                 35.0-39.9  EXTREME OBESITY             III                >40                            Patient's  BMI Body mass index is 26.85 kg/m .  http://hin.nhlbi.nih.gov/menuplanner/menu.cgi  Pre-visit planning  Immunizations - up to date  Colonoscopy -   Mammogram -   Asthma -   PHQ9 -    DARLIN-7 -

## 2021-04-12 NOTE — PROGRESS NOTES
"    Assessment & Plan     Essential hypertension  Well controlled, continue current medications at current doses   - pantoprazole (PROTONIX) 40 MG EC tablet  Dispense: 90 tablet; Refill: 1    Gastroesophageal reflux disease with esophagitis, unspecified whether hemorrhage  Well controlled, continue current medications at current doses   - lisinopril-hydrochlorothiazide (ZESTORETIC) 20-12.5 MG tablet  Dispense: 90 tablet; Refill: 1    Hereditary hemochromatosis (H)  Stable, continue current medications at current doses   - ondansetron (ZOFRAN-ODT) 4 MG ODT tab  Dispense: 90 tablet; Refill: 3    Sundowning  Unclear if meds helped but he wishes to continue-wife not here for visit  - QUEtiapine (SEROQUEL) 25 MG tablet  Dispense: 90 tablet; Refill: 1    Dementia without behavioral disturbance, unspecified dementia type (H)  stable    Mixed hyperlipidemia  continue current medications at current doses   - simvastatin (ZOCOR) 80 MG tablet  Dispense: 90 tablet; Refill: 1    Rash-left calf, likely eczematous issue vs granuloma annulare      Review of external notes as documented elsewhere in note  Review of the result(s) of each unique test - labs  Ordering of each unique test  Prescription drug management         BMI:   Estimated body mass index is 26.85 kg/m  as calculated from the following:    Height as of 6/17/20: 1.715 m (5' 7.5\").    Weight as of this encounter: 78.9 kg (174 lb).       FUTURE APPOINTMENTS:       - Follow-up visit in 6 mo  Regular exercise    No follow-ups on file.    Gerald Diggs MD  [facility]    Ivania Ayers is a 76 year old who presents for the following health issues     HPI     Hypertension Follow-up      Do you check your blood pressure regularly outside of the clinic? No     Are you following a low salt diet? No    Are your blood pressures ever more than 140 on the top number (systolic) OR more   than 90 on the bottom number (diastolic), for example 140/90? " No    GERD-No fevers, melena, hematemesis, or weight loss. , takes PPI    MIxed hyperlipidemie-stable on meds, no muscle aches    Dementia- pt here alone- not sure if seroquel helps -somewhat poor historian- he does think he wants to fill    How many servings of fruits and vegetables do you eat daily?  2-3    On average, how many sweetened beverages do you drink each day (Examples: soda, juice, sweet tea, etc.  Do NOT count diet or artificially sweetened beverages)?   1    How many days per week do you exercise enough to make your heart beat faster? 7    How many minutes a day do you exercise enough to make your heart beat faster? 30 - 60    How many days per week do you miss taking your medication? 0        Review of Systems   Constitutional, HEENT, cardiovascular, pulmonary, gi and gu systems are negative, except as otherwise noted.      Objective    /72 (BP Location: Left arm, Patient Position: Chair, Cuff Size: Adult Regular)   Pulse 68   Temp 97.7  F (36.5  C)   Resp 20   Wt 78.9 kg (174 lb)   BMI 26.85 kg/m    Body mass index is 26.85 kg/m .  Physical Exam   GENERAL: healthy, alert and no distress  EYES: Eyes grossly normal to inspection, PERRL and conjunctivae and sclerae normal  HENT: ear canals and TM's normal, nose and mouth without ulcers or lesions  NECK: no adenopathy, no asymmetry, masses, or scars and thyroid normal to palpation  RESP: lungs clear to auscultation - no rales, rhonchi or wheezes  CV: regular rate and rhythm, normal S1 S2, no S3 or S4, no murmur, click or rub, no peripheral edema and peripheral pulses strong  ABDOMEN: soft, nontender, no hepatosplenomegaly, no masses and bowel sounds normal  MS: no gross musculoskeletal defects noted, no edema  NEURO: Normal strength and tone, mentation intact and speech normal  PSYCH: mentation appears normal, affect normal/bright  PSYCH: pt does ask several question multiple times after already answereed  Skin_ left calf with annular patch  of erythema, no leading scale  Results for orders placed or performed in visit on 04/12/21 (from the past 24 hour(s))   Basic Metabolic Panel (BFP)   Result Value Ref Range    Carbon Dioxide 28.6 20 - 32 mmol/L    Creatinine 1.37 (A) 0.60 - 1.30 mg/dL    Glucose 145 (A) 60 - 99 mg/dL    Sodium 128.6 (A) 135 - 146 mmol/L    Potassium 3.73 3.5 - 5.3 mmol/L    Chloride 92.9 (A) 98 - 110 mmol/L    Urea Nitrogen 11 7 - 25 mg/dL    Calcium 8.9 8.6 - 10.3 mg/dL    BUN/Creatinine Ratio 8.0 6 - 22

## 2021-04-13 ASSESSMENT — ANXIETY QUESTIONNAIRES: GAD7 TOTAL SCORE: 0

## 2021-04-26 ENCOUNTER — TELEPHONE (OUTPATIENT)
Dept: FAMILY MEDICINE | Facility: CLINIC | Age: 76
End: 2021-04-26

## 2021-04-26 DIAGNOSIS — R73.9 HYPERGLYCEMIA: ICD-10-CM

## 2021-04-26 DIAGNOSIS — R79.89 ELEVATED SERUM CREATININE: ICD-10-CM

## 2021-04-26 LAB
BUN SERPL-MCNC: 13 MG/DL (ref 7–25)
BUN/CREATININE RATIO: 8.1 (ref 6–22)
CALCIUM SERPL-MCNC: 9.1 MG/DL (ref 8.6–10.3)
CHLORIDE SERPLBLD-SCNC: 92 MMOL/L (ref 98–110)
CO2 SERPL-SCNC: 28.2 MMOL/L (ref 20–32)
CREAT SERPL-MCNC: 1.61 MG/DL (ref 0.6–1.3)
GFR SERPL CREATININE-BSD FRML MDRD: 41 ML/MIN/1.73M2
GLUCOSE SERPL-MCNC: 103 MG/DL (ref 60–99)
POTASSIUM SERPL-SCNC: 3.55 MMOL/L (ref 3.5–5.3)
SODIUM SERPL-SCNC: 128.8 MMOL/L (ref 135–146)

## 2021-04-26 PROCEDURE — 80048 BASIC METABOLIC PNL TOTAL CA: CPT | Performed by: FAMILY MEDICINE

## 2021-04-26 PROCEDURE — 36415 COLL VENOUS BLD VENIPUNCTURE: CPT | Performed by: FAMILY MEDICINE

## 2021-04-27 NOTE — TELEPHONE ENCOUNTER
Patient and patients wife were informed of message from Dr. Diggs. I transferred them to  to schedule a 3 week f/u.

## 2021-04-29 ENCOUNTER — TRANSFERRED RECORDS (OUTPATIENT)
Dept: FAMILY MEDICINE | Facility: CLINIC | Age: 76
End: 2021-04-29

## 2021-05-06 ENCOUNTER — OFFICE VISIT (OUTPATIENT)
Dept: FAMILY MEDICINE | Facility: CLINIC | Age: 76
End: 2021-05-06

## 2021-05-06 VITALS
BODY MASS INDEX: 28.42 KG/M2 | HEART RATE: 80 BPM | DIASTOLIC BLOOD PRESSURE: 70 MMHG | TEMPERATURE: 98 F | SYSTOLIC BLOOD PRESSURE: 122 MMHG | RESPIRATION RATE: 20 BRPM | WEIGHT: 184.2 LBS

## 2021-05-06 DIAGNOSIS — R79.89 ELEVATED SERUM CREATININE: Primary | ICD-10-CM

## 2021-05-06 PROCEDURE — 99213 OFFICE O/P EST LOW 20 MIN: CPT | Performed by: FAMILY MEDICINE

## 2021-05-06 NOTE — NURSING NOTE
Armen Phelps is here for a medication check and refill.    Questioned patient about current smoking habits.  Pt. quit smoking some time ago.  PULSE regular  My Chart:   CLASSIFICATION OF OVERWEIGHT AND OBESITY BY BMI                        Obesity Class           BMI(kg/m2)  Underweight                                    < 18.5  Normal                                         18.5-24.9  Overweight                                     25.0-29.9  OBESITY                     I                  30.0-34.9                             II                 35.0-39.9  EXTREME OBESITY             III                >40                            Patient's  BMI Body mass index is 28.42 kg/m .  http://hin.nhlbi.nih.gov/menuplanner/menu.cgi  Pre-visit planning  Immunizations - up to date  Colonoscopy -   Mammogram -   Asthma -   PHQ9 -    DARLIN-7 -    Hearing Test -

## 2021-05-06 NOTE — PROGRESS NOTES
SUBJECTIVE: Armen Phelps is a 76 year old male who presents for recheck elevated creatinine.  Pt Cr jumped to 1.61 on 4/26/21- we had pt stop Prinzide-here for recheck but actually saw endocrine this am and had blood done there.    Pt not checking BP at home    Patient Active Problem List   Diagnosis     Sciatica     Thoracic or lumbosacral neuritis or radiculitis, unspecified     HNP (herniated nucleus pulposus), lumbar     Mild alcohol dependence (H)     Spinal stenosis of lumbar region     Health Care Home     ACP (advance care planning)     Past Medical History:   Diagnosis Date     GERD (gastroesophageal reflux disease)      Hypertension      Spinal stenosis of lumbar region      No family history on file.  Social History     Socioeconomic History     Marital status:      Spouse name: Not on file     Number of children: Not on file     Years of education: Not on file     Highest education level: Not on file   Occupational History     Not on file   Social Needs     Financial resource strain: Not on file     Food insecurity     Worry: Not on file     Inability: Not on file     Transportation needs     Medical: Not on file     Non-medical: Not on file   Tobacco Use     Smoking status: Former Smoker     Smokeless tobacco: Never Used   Substance and Sexual Activity     Alcohol use: Yes     Frequency: 4 or more times a week     Drinks per session: 1 or 2     Comment: daily 2 wine     Drug use: No     Sexual activity: Not on file   Lifestyle     Physical activity     Days per week: Not on file     Minutes per session: Not on file     Stress: Not on file   Relationships     Social connections     Talks on phone: Not on file     Gets together: Not on file     Attends [employer] service: Not on file     Active member of club or organization: Not on file     Attends meetings of clubs or organizations: Not on file     Relationship status: Not on file     Intimate partner violence     Fear of current or ex partner:  Not on file     Emotionally abused: Not on file     Physically abused: Not on file     Forced sexual activity: Not on file   Other Topics Concern     Not on file   Social History Narrative     Not on file     Past Surgical History:   Procedure Laterality Date     BILATERAL LUMBAR LAMINECTOMY L2-3 and L3-4 WITH LEFT DISCECTOMY AND EXCISION OF LEFT CYST AT L5-S1  12/2013     CHOLECYSTECTOMY  1997     acetaminophen (TYLENOL) 325 MG tablet, Take 325-650 mg by mouth every 6 hours as needed for mild pain  cyanocobalamin (CYANOCOBALAMIN) 1000 MCG/ML injection, Inject 1 mL (1,000 mcg) into the muscle every 21 days  folic acid (FOLVITE) 1 MG tablet, TAKE ONE TABLET BY MOUTH EVERY DAY FOR FOLIC ACID SUPPLEMENTATION  ondansetron (ZOFRAN-ODT) 4 MG ODT tab, Take 1 tablet (4 mg) by mouth every 8 hours as needed for nausea  pantoprazole (PROTONIX) 40 MG EC tablet, Take 1 tablet (40 mg) by mouth every evening  QUEtiapine (SEROQUEL) 25 MG tablet, Take 1 tablet (25 mg) by mouth At Bedtime  simvastatin (ZOCOR) 80 MG tablet, Take 1 tablet (80 mg) by mouth every evening  testosterone cypionate (DEPOTESTOSTERONE) 100 MG/ML injection, Inject 0.5 mLs (50 mg) into the muscle every 21 days  triamcinolone (KENALOG) 0.1 % external cream, Apply topically 2 times daily    No current facility-administered medications on file prior to visit.        Allergies: Patient has no known allergies.    Immunization History   Administered Date(s) Administered     COVID-19,PF,Moderna 01/29/2021, 02/26/2021     Flu, Unspecified 10/01/2020     Influenza (High Dose) 3 valent vaccine 11/05/2013, 09/28/2014, 09/15/2015, 09/21/2016, 09/06/2017, 09/07/2018, 10/12/2019, 10/21/2019, 09/16/2020     Influenza (IIV3) PF 11/17/2008, 10/08/2010, 10/21/2011, 09/20/2012, 11/05/2013     Influenza, Quad, High Dose, Pf, 65yr + 09/16/2020     Pneumo Conj 13-V (2010&after) 10/12/2016     Pneumococcal 23 valent 10/08/2010     TDAP Vaccine (Adacel) 07/09/2019     Zoster vaccine  recombinant adjuvanted (SHINGRIX) 10/12/2019, 10/21/2019, 12/12/2019     Zoster vaccine, live 07/21/2010      OBJECTIVE: /70 (BP Location: Right arm, Patient Position: Chair, Cuff Size: Adult Regular)   Pulse 80   Temp 98  F (36.7  C)   Resp 20   Wt 83.6 kg (184 lb 3.2 oz)   BMI 28.42 kg/m     S1 and S2 normal, no murmurs, clicks, gallops or rubs. Regular rate and rhythm. Chest is clear; no wheezes or rales. No edema or JVD.     ASSESSMENT: /PLAN:   (R79.89) Elevated serum creatinine  (primary encounter diagnosis)  Comment: pt had blood drawn this am at [facility]- we were able to speak with that clinic and lab was sent out- did include BMP- they will fax results when they have  Plan: Basic Metabolic Panel (BFP), VENOUS COLLECTION        Await labs, BP controlled off Prinzide-Check blood pressure readings outside of the clinic several times per week, write down values, and follow up if elevated within the next several weeks. Blood pressure can be checked at the firestation, drugstore,  or any valid site.     (F84.97) Creatinine elevation  Comment:   Plan:

## 2021-05-12 ENCOUNTER — TRANSFERRED RECORDS (OUTPATIENT)
Dept: FAMILY MEDICINE | Facility: CLINIC | Age: 76
End: 2021-05-12

## 2021-05-13 ENCOUNTER — OFFICE VISIT (OUTPATIENT)
Dept: FAMILY MEDICINE | Facility: CLINIC | Age: 76
End: 2021-05-13

## 2021-05-13 VITALS
RESPIRATION RATE: 20 BRPM | DIASTOLIC BLOOD PRESSURE: 80 MMHG | SYSTOLIC BLOOD PRESSURE: 134 MMHG | TEMPERATURE: 99.5 F | HEART RATE: 96 BPM | WEIGHT: 184 LBS | BODY MASS INDEX: 28.39 KG/M2

## 2021-05-13 DIAGNOSIS — G89.29 CHRONIC BILATERAL LOW BACK PAIN WITHOUT SCIATICA: Primary | ICD-10-CM

## 2021-05-13 DIAGNOSIS — M54.50 CHRONIC BILATERAL LOW BACK PAIN WITHOUT SCIATICA: Primary | ICD-10-CM

## 2021-05-13 LAB — ERYTHROCYTE [SEDIMENTATION RATE] IN BLOOD: 5 MM/HR (ref 0–20)

## 2021-05-13 PROCEDURE — 85651 RBC SED RATE NONAUTOMATED: CPT | Performed by: FAMILY MEDICINE

## 2021-05-13 PROCEDURE — 36415 COLL VENOUS BLD VENIPUNCTURE: CPT | Performed by: FAMILY MEDICINE

## 2021-05-13 PROCEDURE — 99213 OFFICE O/P EST LOW 20 MIN: CPT | Performed by: FAMILY MEDICINE

## 2021-05-13 RX ORDER — GABAPENTIN 100 MG/1
100 CAPSULE ORAL
Qty: 30 CAPSULE | Refills: 1 | Status: SHIPPED | OUTPATIENT
Start: 2021-05-13 | End: 2021-07-15

## 2021-05-13 NOTE — PROGRESS NOTES
SUBJECTIVE:   Armen Phelps is a 76 year old male who complains of low back pain for 3 months, positional with bending or lifting, without radiation down the legs. Pain worse when gets up from sitting. Precipitating factors: none recalled by the patient. Prior history of back problems: previous spinal surgery - lumbar laminectomy 12/13. There is no numbness in the legs.    No fecal incontinence, saddle numbness, fever, or weakness.     Pt does not feel tylenol helps much    Pt took a tramadol that helped    Pain most bothersome at bedtime    OBJECTIVE:  /80 (BP Location: Right arm, Patient Position: Chair, Cuff Size: Adult Regular)   Pulse 96   Temp 99.5  F (37.5  C)   Resp 20   Wt 83.5 kg (184 lb)   BMI 28.39 kg/m     Patient appears to be in mild to moderate pain, antalgic gait noted. Lumbosacral spine area reveals no local tenderness or mass.  Painful and reduced LS ROM noted. Straight leg raise is negative at 70 degrees on both sides. DTR's,d sensation normal     Pt does have notable weakness left LE dorsiflexion     Peripheral pulses are palpable. X-Ray: not indicated.    ASSESSMENT:   Low back pain, hx DDD, suspect spinal stenosis-discussed options    Check esr given difficulty rising from chair    PLAN:will try gabapentin at hs as sleep main issue, can increase as needed to 300 mg if not too drowsy      Consider spine eval if not better    Consider Physical Therapy and XRay studies if not improving. Call or return to clinic prn if these symptoms worsen or fail to improve as anticipated.

## 2021-05-13 NOTE — NURSING NOTE
"Armen Phelps is here for back, hip and knee pain for some time. Fell on some ice and has been sore since. He gets \"zapping pain\" at times and it happens when moving or sitting still.    Questioned patient about current smoking habits.  Pt. quit smoking some time ago.  PULSE regular  My Chart:   CLASSIFICATION OF OVERWEIGHT AND OBESITY BY BMI                        Obesity Class           BMI(kg/m2)  Underweight                                    < 18.5  Normal                                         18.5-24.9  Overweight                                     25.0-29.9  OBESITY                     I                  30.0-34.9                             II                 35.0-39.9  EXTREME OBESITY             III                >40                            Patient's  BMI Body mass index is 28.39 kg/m .  http://hin.nhlbi.nih.gov/menuplanner/menu.cgi  Pre-visit planning  Immunizations - up to date  Colonoscopy -   Mammogram -   Asthma -   PHQ9 -    DARLIN-7 -    Hearing Test -         "

## 2021-05-25 ENCOUNTER — OFFICE VISIT (OUTPATIENT)
Dept: FAMILY MEDICINE | Facility: CLINIC | Age: 76
End: 2021-05-25

## 2021-05-25 VITALS
OXYGEN SATURATION: 96 % | TEMPERATURE: 98.1 F | DIASTOLIC BLOOD PRESSURE: 90 MMHG | SYSTOLIC BLOOD PRESSURE: 136 MMHG | HEART RATE: 95 BPM | BODY MASS INDEX: 29.01 KG/M2 | WEIGHT: 188 LBS | RESPIRATION RATE: 22 BRPM

## 2021-05-25 DIAGNOSIS — Z98.890 HISTORY OF LUMBAR LAMINECTOMY: ICD-10-CM

## 2021-05-25 DIAGNOSIS — R60.9 EDEMA, UNSPECIFIED TYPE: ICD-10-CM

## 2021-05-25 DIAGNOSIS — G89.29 CHRONIC BILATERAL LOW BACK PAIN WITHOUT SCIATICA: Primary | ICD-10-CM

## 2021-05-25 DIAGNOSIS — M54.50 CHRONIC BILATERAL LOW BACK PAIN WITHOUT SCIATICA: Primary | ICD-10-CM

## 2021-05-25 PROCEDURE — 99213 OFFICE O/P EST LOW 20 MIN: CPT | Performed by: FAMILY MEDICINE

## 2021-05-25 NOTE — NURSING NOTE
Chief Complaint   Patient presents with     Follow Up     right hip and lower back area are not getting any better, gabapentin did not help with the pain at all     Edema     in foot and ankle areas, has been going on for the past week but does look better now     Pre-visit Screening:  Immunizations:  up to date  Colonoscopy:  is up to date-need to get records from [facility]  Mammogram: NA  Asthma Action Test/Plan:  NA  PHQ9:  NA  GAD7:  NA  Questioned patient about current smoking habits Pt. quit smoking some time ago.  Ok to leave detailed message on voice mail for today's visit only Yes, phone # [phone]

## 2021-05-25 NOTE — PROGRESS NOTES
SUBJECTIVE:  Armen Phelps, a 76 year old male scheduled an appointment to discuss the following issues:     Chronic bilateral low back pain without sciatica  History of lumbar laminectomy  Edema, unspecified type     Pt states back still bothering him- did not respond to gabapentin, struggling at night    Pt noting some swelling in bilateral ankles and lower legs for a few weeks, better today. No /cp or shortness of breath . No calf pain    Medical, social, surgical, and family histories reviewed.    Patient Active Problem List   Diagnosis     Sciatica     Thoracic or lumbosacral neuritis or radiculitis, unspecified     HNP (herniated nucleus pulposus), lumbar     Mild alcohol dependence (H)     Spinal stenosis of lumbar region     Health Care Home     ACP (advance care planning)     Past Medical History:   Diagnosis Date     GERD (gastroesophageal reflux disease)      Hypertension      Spinal stenosis of lumbar region      No family history on file.  Social History     Socioeconomic History     Marital status:      Spouse name: Not on file     Number of children: Not on file     Years of education: Not on file     Highest education level: Not on file   Occupational History     Not on file   Social Needs     Financial resource strain: Not on file     Food insecurity     Worry: Not on file     Inability: Not on file     Transportation needs     Medical: Not on file     Non-medical: Not on file   Tobacco Use     Smoking status: Former Smoker     Smokeless tobacco: Never Used   Substance and Sexual Activity     Alcohol use: Yes     Frequency: 4 or more times a week     Drinks per session: 1 or 2     Comment: daily 2 wine     Drug use: No     Sexual activity: Not on file   Lifestyle     Physical activity     Days per week: Not on file     Minutes per session: Not on file     Stress: Not on file   Relationships     Social connections     Talks on phone: Not on file     Gets together: Not on file     Attends  Confucianism service: Not on file     Active member of club or organization: Not on file     Attends meetings of clubs or organizations: Not on file     Relationship status: Not on file     Intimate partner violence     Fear of current or ex partner: Not on file     Emotionally abused: Not on file     Physically abused: Not on file     Forced sexual activity: Not on file   Other Topics Concern     Not on file   Social History Narrative     Not on file     Past Surgical History:   Procedure Laterality Date     BILATERAL LUMBAR LAMINECTOMY L2-3 and L3-4 WITH LEFT DISCECTOMY AND EXCISION OF LEFT CYST AT L5-S1  12/2013     CHOLECYSTECTOMY  1997     acetaminophen (TYLENOL) 325 MG tablet, Take 325-650 mg by mouth every 6 hours as needed for mild pain  cyanocobalamin (CYANOCOBALAMIN) 1000 MCG/ML injection, Inject 1 mL (1,000 mcg) into the muscle every 21 days  folic acid (FOLVITE) 1 MG tablet, TAKE ONE TABLET BY MOUTH EVERY DAY FOR FOLIC ACID SUPPLEMENTATION  gabapentin (NEURONTIN) 100 MG capsule, Take 1 capsule (100 mg) by mouth nightly as needed  ondansetron (ZOFRAN-ODT) 4 MG ODT tab, Take 1 tablet (4 mg) by mouth every 8 hours as needed for nausea  pantoprazole (PROTONIX) 40 MG EC tablet, Take 1 tablet (40 mg) by mouth every evening  QUEtiapine (SEROQUEL) 25 MG tablet, Take 1 tablet (25 mg) by mouth At Bedtime  simvastatin (ZOCOR) 80 MG tablet, Take 1 tablet (80 mg) by mouth every evening  testosterone cypionate (DEPOTESTOSTERONE) 100 MG/ML injection, Inject 0.5 mLs (50 mg) into the muscle every 21 days  triamcinolone (KENALOG) 0.1 % external cream, Apply topically 2 times daily    No current facility-administered medications on file prior to visit.        Allergies: Patient has no known allergies.    Immunization History   Administered Date(s) Administered     COVID-19,PF,Moderna 01/29/2021, 02/26/2021     Flu, Unspecified 10/01/2020     Influenza (High Dose) 3 valent vaccine 11/05/2013, 09/28/2014, 09/15/2015,  09/21/2016, 09/06/2017, 09/07/2018, 10/12/2019, 10/21/2019, 09/16/2020     Influenza (IIV3) PF 11/17/2008, 10/08/2010, 10/21/2011, 09/20/2012, 11/05/2013     Influenza, Quad, High Dose, Pf, 65yr + 09/16/2020     Pneumo Conj 13-V (2010&after) 10/12/2016     Pneumococcal 23 valent 10/08/2010     TDAP Vaccine (Adacel) 07/09/2019     Zoster vaccine recombinant adjuvanted (SHINGRIX) 10/12/2019, 10/21/2019, 12/12/2019     Zoster vaccine, live 07/21/2010        ROS:  CONSTITUTIONAL: NEGATIVE for fever, chills  RESP: NEGATIVE for significant cough or SOB  CV: NEGATIVE for chest pain, palpitations     OBJECTIVE:  BP (!) 136/90 (BP Location: Right arm, Patient Position: Sitting, Cuff Size: Adult Large)   Pulse 95   Temp 98.1  F (36.7  C) (Temporal)   Resp 22   Wt 85.3 kg (188 lb)   SpO2 96%   BMI 29.01 kg/m    EXAM:  GENERAL APPEARANCE: healthy, alert and no distress  RESP: lungs clear to auscultation - no rales, rhonchi or wheezes  CV: regular rates and rhythm, normal S1 S2, no S3 or S4 and no murmur, click or rub -  Trace to 1+ edema bilateral ankles to mid calf  MS: decreased lumbar ROM    ASSESSMENT/PLAN:  (M54.5,  G89.29) Chronic bilateral low back pain without sciatica  (primary encounter diagnosis)  Comment: likely spinal stenosis, DJD-given ongoing pain despite conservative measures will suggest ortho  Plan: Other Specialty Referral            (Z98.890) History of lumbar laminectomy  Comment:   Plan: Other Specialty Referral            (R60.9) Edema, unspecified type  Comment: likely dependent edema, pt not as active as in past due to back pain, discussed options  Plan: elevate legs, get mild compression socks, work on more activity    If not responding or worsening consider diuretics but with decreased renal function try to avoid    Consider cardiac eval as well

## 2021-06-15 ENCOUNTER — TRANSFERRED RECORDS (OUTPATIENT)
Dept: FAMILY MEDICINE | Facility: CLINIC | Age: 76
End: 2021-06-15

## 2021-06-28 ENCOUNTER — TRANSFERRED RECORDS (OUTPATIENT)
Dept: FAMILY MEDICINE | Facility: CLINIC | Age: 76
End: 2021-06-28

## 2021-07-07 ENCOUNTER — TELEPHONE (OUTPATIENT)
Dept: FAMILY MEDICINE | Facility: CLINIC | Age: 76
End: 2021-07-07

## 2021-07-07 NOTE — TELEPHONE ENCOUNTER
Pt wife called to say today when pt was driving he got lost, wondering if he should see neuro before his pre-op with you on the 15th. Lm on  informing that you are out tell Monday. Please advise Thanks    Cam # [phone]

## 2021-07-08 NOTE — TELEPHONE ENCOUNTER
I think this can wait until after surgery if just a one time event.  If other memory or thought concerns that are worsening needs urgent evaluation-would need to start in clinic here or ED- would not get into neurology that fast unless in hospital with emergency

## 2021-07-15 ENCOUNTER — OFFICE VISIT (OUTPATIENT)
Dept: FAMILY MEDICINE | Facility: CLINIC | Age: 76
End: 2021-07-15

## 2021-07-15 VITALS
TEMPERATURE: 98.1 F | BODY MASS INDEX: 26.34 KG/M2 | WEIGHT: 184 LBS | SYSTOLIC BLOOD PRESSURE: 120 MMHG | HEIGHT: 70 IN | HEART RATE: 105 BPM | OXYGEN SATURATION: 98 % | DIASTOLIC BLOOD PRESSURE: 72 MMHG

## 2021-07-15 DIAGNOSIS — Z01.818 PREOPERATIVE EXAMINATION: Primary | ICD-10-CM

## 2021-07-15 DIAGNOSIS — I10 ESSENTIAL HYPERTENSION: ICD-10-CM

## 2021-07-15 DIAGNOSIS — M51.06 LUMBAR DISC HERNIATION WITH MYELOPATHY: ICD-10-CM

## 2021-07-15 DIAGNOSIS — R79.89 ELEVATED SERUM CREATININE: ICD-10-CM

## 2021-07-15 LAB
% GRANULOCYTES: 61.5 %
BUN SERPL-MCNC: 6 MG/DL (ref 7–25)
BUN/CREATININE RATIO: 6.3 (ref 6–22)
CALCIUM SERPL-MCNC: 9.3 MG/DL (ref 8.6–10.3)
CHLORIDE SERPLBLD-SCNC: 104.6 MMOL/L (ref 98–110)
CO2 SERPL-SCNC: 30.4 MMOL/L (ref 20–32)
CREAT SERPL-MCNC: 0.96 MG/DL (ref 0.6–1.3)
GLUCOSE SERPL-MCNC: 112 MG/DL (ref 60–99)
HCT VFR BLD AUTO: 43 % (ref 40–53)
HEMOGLOBIN: 13.6 G/DL (ref 13.3–17.7)
LYMPHOCYTES NFR BLD AUTO: 30.3 %
MCH RBC QN AUTO: 35 PG (ref 26–33)
MCHC RBC AUTO-ENTMCNC: 31.6 G/DL (ref 31–36)
MCV RBC AUTO: 110.5 FL (ref 78–100)
MONOCYTES NFR BLD AUTO: 8.2 %
PLATELET COUNT - QUEST: 210 10^9/L (ref 150–375)
POTASSIUM SERPL-SCNC: 3.68 MMOL/L (ref 3.5–5.3)
RBC # BLD AUTO: 3.89 10*12/L (ref 4.4–5.9)
SODIUM SERPL-SCNC: 141 MMOL/L (ref 135–146)
WBC # BLD AUTO: 5.4 10*9/L (ref 4–11)

## 2021-07-15 PROCEDURE — 93000 ELECTROCARDIOGRAM COMPLETE: CPT | Performed by: FAMILY MEDICINE

## 2021-07-15 PROCEDURE — 99214 OFFICE O/P EST MOD 30 MIN: CPT | Mod: 25 | Performed by: FAMILY MEDICINE

## 2021-07-15 PROCEDURE — 36415 COLL VENOUS BLD VENIPUNCTURE: CPT | Performed by: FAMILY MEDICINE

## 2021-07-15 PROCEDURE — 85025 COMPLETE CBC W/AUTO DIFF WBC: CPT | Performed by: FAMILY MEDICINE

## 2021-07-15 PROCEDURE — 80048 BASIC METABOLIC PNL TOTAL CA: CPT | Performed by: FAMILY MEDICINE

## 2021-07-15 RX ORDER — MULTIPLE VITAMINS W/ MINERALS TAB 9MG-400MCG
2 TAB ORAL DAILY
COMMUNITY
End: 2022-02-14

## 2021-07-15 ASSESSMENT — MIFFLIN-ST. JEOR: SCORE: 1570.87

## 2021-07-15 NOTE — PROGRESS NOTES
[facility]  [address]  [address]  [facility] [address]  Phone: [phone]  Fax: [phone]  Primary Provider: Mini Diggs  Pre-op Performing Provider: MINI DIGGS      PREOPERATIVE EVALUATION:  Today's date: 7/15/2021    Armen Phelps is a 76 year old male who presents for a preoperative evaluation.    Surgical Information:  Surgery/Procedure: spinal discectomy surgery  Surgery Location: [facility] or [facility]  Surgeon: Dr. Lindsay  Surgery Date: Aug 3rd or Aug 10th  Time of Surgery: TBD  Where patient plans to recover: At home with family  Fax number for surgical facility: [phone]    Type of Anesthesia Anticipated: to be determined    Assessment & Plan     The proposed surgical procedure is considered INTERMEDIATE risk.    Preoperative examination    - Basic Metabolic Panel (BFP)  - HEMOGRAM PLATELET DIFF (BFP)  - VENOUS COLLECTION  - EKG 12-lead complete w/read - Clinics    Lumbar disc herniation with myelopathy      Essential hypertension  Well controlled  - EKG 12-lead complete w/read - Clinics    Elevated serum creatinine  Normal today  - Basic Metabolic Panel (BFP)  - VENOUS COLLECTION          Risks and Recommendations:  The patient has the following additional risks and recommendations for perioperative complications:   - No identified additional risk factors other than previously addressed    Medication Instructions:  Patient is to take all scheduled medications on the day of surgery-can wait until pm after surgery    RECOMMENDATION:  APPROVAL GIVEN to proceed with proposed procedure, without further diagnostic evaluation.    Review of external notes as documented above   Review of the result(s) of each unique test - labs, EKG                  Subjective     HPI related to upcoming procedure:     1. No - Have you ever had a heart attack or stroke?  2. No - Have you ever had surgery on your heart or blood vessels, such as a  stent, coronary (heart) bypass, or surgery on an artery in the head, neck, heart, or legs?  3. No - Do you have chest pain when you are physically active?  4. No - Do you have a history of heart failure?  5. No - Do you currently have a cold, bronchitis, or symptoms of other respiratory (head and chest) infections?  6. No - Do you have a cough, shortness of breath, or wheezing?  7. No - Do you or anyone in your family have a history of blood clots?  8. No - Do you or anyone in your family have a serious bleeding problem, such as long-lasting bleeding after surgeries or cuts?  9. No - Have you ever had anemia or been told to take iron pills?  10. No - Have you had any abnormal blood loss such as black, tarry or bloody stools, or abnormal vaginal bleeding?  11. No - Have you ever had a blood transfusion?  12. Yes - Are you willing to have a blood transfusion if it is medically needed before, during, or after your surgery?  13. No - Have you or anyone in your family ever had problems with anesthesia (sedation for surgery)?  14. No - Do you have sleep apnea, excessive snoring, or daytime drowsiness?   15. No - Do you have any artifical heart valves or other implanted medical devices, such as a pacemaker, defibrillator, or continuous glucose monitor?  16. No - Do you have any artifical joints?  17. No - Are you allergic to latex?  18. No - Is there any chance that you may be pregnant?      Health Care Directive:  Patient does not have a Health Care Directive or Living Will: Patient states has Advance Directive and will bring in a copy to clinic.          Status of Chronic Conditions:  See problem list for active medical problems.  Problems all longstanding and stable, except as noted/documented.  See ROS for pertinent symptoms related to these conditions.    HYPERTENSION - Patient has longstanding history of HTN , currently denies any symptoms referable to elevated blood pressure. Specifically denies chest pain,  palpitations, dyspnea, orthopnea, PND or peripheral edema. Blood pressure readings have been in normal range. Current medication regimen is as listed below. Patient denies any side effects of medication.     RENAL INSUFFICIENCY - Patient has a longstanding history of moderate-severe chronic renal insufficiency. Last Cr 0.96.       Review of Systems  CONSTITUTIONAL: NEGATIVE for fever, chills, change in weight  ENT/MOUTH: NEGATIVE for ear, mouth and throat problems  RESP: NEGATIVE for significant cough or SOB  CV: NEGATIVE for chest pain, palpitations or peripheral edema    Patient Active Problem List    Diagnosis Date Noted     Mild alcohol dependence (H) 06/17/2020     Priority: Medium     HNP (herniated nucleus pulposus), lumbar 12/30/2013     Priority: Medium     Spinal stenosis of lumbar region 12/30/2013     Priority: Medium     Sciatica 04/27/2009     Priority: Medium     Thoracic or lumbosacral neuritis or radiculitis, unspecified 04/27/2009     Priority: Medium     Health Care Home 06/17/2020     Priority: Low     ACP (advance care planning) 06/17/2020     Priority: Low      Past Medical History:   Diagnosis Date     GERD (gastroesophageal reflux disease)      Hypertension      Spinal stenosis of lumbar region      Past Surgical History:   Procedure Laterality Date     BILATERAL LUMBAR LAMINECTOMY L2-3 and L3-4 WITH LEFT DISCECTOMY AND EXCISION OF LEFT CYST AT L5-S1  12/2013     CHOLECYSTECTOMY  1997     Current Outpatient Medications   Medication Sig Dispense Refill     acetaminophen (TYLENOL) 325 MG tablet Take 325-650 mg by mouth every 6 hours as needed for mild pain       cyanocobalamin (CYANOCOBALAMIN) 1000 MCG/ML injection Inject 1 mL (1,000 mcg) into the muscle every 21 days       multivitamin w/minerals (MULTI-VITAMIN) tablet Take 1 tablet by mouth daily       ondansetron (ZOFRAN-ODT) 4 MG ODT tab Take 1 tablet (4 mg) by mouth every 8 hours as needed for nausea 90 tablet 3     pantoprazole (PROTONIX)  40 MG EC tablet Take 1 tablet (40 mg) by mouth every evening 90 tablet 1     Probiotic Product (PROBIOTIC-10 PO)        QUEtiapine (SEROQUEL) 25 MG tablet Take 1 tablet (25 mg) by mouth At Bedtime 90 tablet 1     simvastatin (ZOCOR) 80 MG tablet Take 1 tablet (80 mg) by mouth every evening 90 tablet 1     testosterone cypionate (DEPOTESTOSTERONE) 100 MG/ML injection Inject 0.5 mLs (50 mg) into the muscle every 21 days         No Known Allergies     Social History     Tobacco Use     Smoking status: Former Smoker     Smokeless tobacco: Never Used   Substance Use Topics     Alcohol use: Yes     Comment: daily 2 wine     No family history on file.  History   Drug Use No         Objective     /72 (BP Location: Right arm, Patient Position: Sitting, Cuff Size: Adult Large)   Pulse 105   Temp 98.1  F (36.7  C) (Temporal)   Wt 83.5 kg (184 lb)   SpO2 98%   BMI 28.39 kg/m      Physical Exam  GENERAL APPEARANCE: healthy, alert and no distress  HENT: ear canals and TM's normal and nose and mouth without ulcers or lesions  RESP: lungs clear to auscultation - no rales, rhonchi or wheezes  CV: regular rate and rhythm, normal S1 S2, no S3 or S4 and no murmur, click or rub   ABDOMEN: soft, nontender, no HSM or masses and bowel sounds normal  NEURO: Normal strength and tone, sensory exam grossly normal, mentation intact and speech normal    Recent Labs   Lab Test 04/26/21  1322 04/12/21  1540 10/23/20  1523 08/05/20  1520 06/17/20  0000   HGB  --   --  13.5  --  13.4   PLT  --   --  152  --  160   .8* 128.6*  --   --  141.5   POTASSIUM 3.55 3.73  --   --  3.80   CR 1.61* 1.37*  --   --  0.92   A1C  --   --   --  4.9  --         Diagnostics:  Recent Results (from the past 24 hour(s))   Basic Metabolic Panel (BFP)    Collection Time: 07/15/21 12:00 AM   Result Value Ref Range    Carbon Dioxide 30.4 20 - 32 mmol/L    Creatinine 0.96 0.60 - 1.30 mg/dL    Glucose 112 (A) 60 - 99 mg/dL    Sodium 141.0 135 - 146 mmol/L     Potassium 3.68 3.5 - 5.3 mmol/L    Chloride 104.6 98 - 110 mmol/L    Urea Nitrogen 6 (A) 7 - 25 mg/dL    Calcium 9.3 8.6 - 10.3 mg/dL    BUN/Creatinine Ratio 6.3 6 - 22   HEMOGRAM PLATELET DIFF (BFP)    Collection Time: 07/15/21  2:23 PM   Result Value Ref Range    WBC 5.4 4.0 - 11 10*9/L    RBC Count 3.89 (A) 4.4 - 5.9 10*12/L    Hemoglobin 13.6 13.3 - 17.7 g/dL    Hematocrit 43.0 40.0 - 53.0 %    .5 (A) 78 - 100 fL    MCH 35.0 (A) 26 - 33 pg    MCHC 31.6 31 - 36 g/dL    Platelet Count 210 150 - 375 10^9/L    % Granulocytes 61.5 %    % Lymphocytes 30.3 %    % Monocytes 8.2 %      EKG: sinus tachycardia, normal axis, normal intervals, no acute ST/T changes c/w ischemia, no LVH by voltage criteria, unchanged from previous tracings    Revised Cardiac Risk Index (RCRI):  The patient has the following serious cardiovascular risks for perioperative complications:   - No serious cardiac risks = 0 points     RCRI Interpretation: 0 points: Class I (very low risk - 0.4% complication rate)           Signed Electronically by: Gerald Diggs MD  Copy of this evaluation report is provided to requesting physician.

## 2021-08-25 ENCOUNTER — TRANSFERRED RECORDS (OUTPATIENT)
Dept: FAMILY MEDICINE | Facility: CLINIC | Age: 76
End: 2021-08-25

## 2021-09-03 DIAGNOSIS — Z11.59 ENCOUNTER FOR SCREENING FOR OTHER VIRAL DISEASES: ICD-10-CM

## 2021-09-04 ENCOUNTER — HEALTH MAINTENANCE LETTER (OUTPATIENT)
Age: 76
End: 2021-09-04

## 2021-09-09 ENCOUNTER — TRANSFERRED RECORDS (OUTPATIENT)
Dept: MULTI SPECIALTY CLINIC | Facility: CLINIC | Age: 76
End: 2021-09-09

## 2021-09-09 LAB
ALT SERPL-CCNC: 20 U/L (ref 13–61)
AST SERPL-CCNC: 29 U/L (ref 15–37)
CREATININE (EXTERNAL): 0.9 MG/DL (ref 0.7–1.2)
GFR ESTIMATED (EXTERNAL): >60 ML/MIN/1.73M2
GFR ESTIMATED (IF AFRICAN AMERICAN) (EXTERNAL): >60 ML/MIN/1.73M2
GLUCOSE (EXTERNAL): 96 MG/DL (ref 74–106)
POTASSIUM (EXTERNAL): 4.2 MMOL/L (ref 3.5–5)

## 2021-09-13 ENCOUNTER — TRANSFERRED RECORDS (OUTPATIENT)
Dept: FAMILY MEDICINE | Facility: CLINIC | Age: 76
End: 2021-09-13

## 2021-09-14 RX ORDER — LISINOPRIL AND HYDROCHLOROTHIAZIDE 12.5; 2 MG/1; MG/1
1 TABLET ORAL DAILY
COMMUNITY
End: 2021-09-21

## 2021-09-20 ENCOUNTER — LAB (OUTPATIENT)
Dept: LAB | Facility: CLINIC | Age: 76
End: 2021-09-20
Payer: COMMERCIAL

## 2021-09-20 DIAGNOSIS — Z11.59 ENCOUNTER FOR SCREENING FOR OTHER VIRAL DISEASES: ICD-10-CM

## 2021-09-20 PROCEDURE — U0003 INFECTIOUS AGENT DETECTION BY NUCLEIC ACID (DNA OR RNA); SEVERE ACUTE RESPIRATORY SYNDROME CORONAVIRUS 2 (SARS-COV-2) (CORONAVIRUS DISEASE [COVID-19]), AMPLIFIED PROBE TECHNIQUE, MAKING USE OF HIGH THROUGHPUT TECHNOLOGIES AS DESCRIBED BY CMS-2020-01-R: HCPCS

## 2021-09-20 NOTE — PHARMACY-ADMISSION MEDICATION HISTORY
PTA meds completed by pre-admitting nurse, Margarita Vivar, and reviewed by pharmacy     Prior to Admission medications    Medication Sig Last Dose Taking? Auth Provider   lisinopril-hydrochlorothiazide (ZESTORETIC) 20-12.5 MG tablet Take 1 tablet by mouth daily  Yes Reported, Patient   pantoprazole (PROTONIX) 40 MG EC tablet Take 1 tablet (40 mg) by mouth every evening  Yes Gerald Diggs MD   QUEtiapine (SEROQUEL) 25 MG tablet Take 1 tablet (25 mg) by mouth At Bedtime  Yes Gerald Diggs MD   simvastatin (ZOCOR) 80 MG tablet Take 1 tablet (80 mg) by mouth every evening  Patient taking differently: Take 40 mg by mouth every evening   Yes Gerald Diggs MD   acetaminophen (TYLENOL) 325 MG tablet Take 325-650 mg by mouth every 6 hours as needed for mild pain   Unknown, Entered By History   cyanocobalamin (CYANOCOBALAMIN) 1000 MCG/ML injection Inject 1 mL (1,000 mcg) into the muscle every 21 days   Gerald Diggs MD   multivitamin w/minerals (MULTI-VITAMIN) tablet Take 1 tablet by mouth daily   Reported, Patient   ondansetron (ZOFRAN-ODT) 4 MG ODT tab Take 1 tablet (4 mg) by mouth every 8 hours as needed for nausea   Gerald Diggs MD   Probiotic Product (PROBIOTIC-10 PO)    Reported, Patient   testosterone cypionate (DEPOTESTOSTERONE) 100 MG/ML injection Inject 0.5 mLs (50 mg) into the muscle every 21 days   Gerald Diggs MD

## 2021-09-21 LAB — SARS-COV-2 RNA RESP QL NAA+PROBE: NEGATIVE

## 2021-09-23 ENCOUNTER — ANESTHESIA (OUTPATIENT)
Dept: SURGERY | Facility: CLINIC | Age: 76
DRG: 459 | End: 2021-09-23
Payer: COMMERCIAL

## 2021-09-23 ENCOUNTER — ANESTHESIA EVENT (OUTPATIENT)
Dept: SURGERY | Facility: CLINIC | Age: 76
DRG: 459 | End: 2021-09-23
Payer: COMMERCIAL

## 2021-09-23 ENCOUNTER — APPOINTMENT (OUTPATIENT)
Dept: GENERAL RADIOLOGY | Facility: CLINIC | Age: 76
DRG: 459 | End: 2021-09-23
Attending: ORTHOPAEDIC SURGERY
Payer: COMMERCIAL

## 2021-09-23 ENCOUNTER — HOSPITAL ENCOUNTER (INPATIENT)
Facility: CLINIC | Age: 76
LOS: 15 days | Discharge: NURSING, REHAB OR LONG-TERM CARE | DRG: 459 | End: 2021-10-08
Attending: ORTHOPAEDIC SURGERY | Admitting: ORTHOPAEDIC SURGERY
Payer: COMMERCIAL

## 2021-09-23 DIAGNOSIS — F10.10 ALCOHOL USE DISORDER, MILD, ABUSE: ICD-10-CM

## 2021-09-23 DIAGNOSIS — N40.0 BENIGN PROSTATIC HYPERPLASIA WITHOUT LOWER URINARY TRACT SYMPTOMS: ICD-10-CM

## 2021-09-23 DIAGNOSIS — I69.919 COGNITIVE DEFICITS AS LATE EFFECT OF CEREBROVASCULAR DISEASE: ICD-10-CM

## 2021-09-23 DIAGNOSIS — I10 BENIGN ESSENTIAL HYPERTENSION: ICD-10-CM

## 2021-09-23 DIAGNOSIS — Z98.1 S/P LUMBAR FUSION: Primary | ICD-10-CM

## 2021-09-23 LAB
ABO/RH(D): NORMAL
ANTIBODY SCREEN: NEGATIVE
GRAM STAIN RESULT: NORMAL
GRAM STAIN RESULT: NORMAL
MAGNESIUM SERPL-MCNC: 1.8 MG/DL (ref 1.6–2.3)
PHOSPHATE SERPL-MCNC: 2.9 MG/DL (ref 2.5–4.5)
SPECIMEN EXPIRATION DATE: NORMAL

## 2021-09-23 PROCEDURE — 87070 CULTURE OTHR SPECIMN AEROBIC: CPT | Performed by: ORTHOPAEDIC SURGERY

## 2021-09-23 PROCEDURE — 258N000003 HC RX IP 258 OP 636: Performed by: NURSE ANESTHETIST, CERTIFIED REGISTERED

## 2021-09-23 PROCEDURE — 36415 COLL VENOUS BLD VENIPUNCTURE: CPT | Performed by: ANESTHESIOLOGY

## 2021-09-23 PROCEDURE — 250N000011 HC RX IP 250 OP 636: Performed by: NURSE ANESTHETIST, CERTIFIED REGISTERED

## 2021-09-23 PROCEDURE — 0SB20ZZ EXCISION OF LUMBAR VERTEBRAL DISC, OPEN APPROACH: ICD-10-PCS | Performed by: ORTHOPAEDIC SURGERY

## 2021-09-23 PROCEDURE — 250N000009 HC RX 250: Performed by: PHYSICIAN ASSISTANT

## 2021-09-23 PROCEDURE — C1762 CONN TISS, HUMAN(INC FASCIA): HCPCS | Performed by: ORTHOPAEDIC SURGERY

## 2021-09-23 PROCEDURE — 87075 CULTR BACTERIA EXCEPT BLOOD: CPT | Performed by: ORTHOPAEDIC SURGERY

## 2021-09-23 PROCEDURE — 87102 FUNGUS ISOLATION CULTURE: CPT | Performed by: ORTHOPAEDIC SURGERY

## 2021-09-23 PROCEDURE — 01NB0ZZ RELEASE LUMBAR NERVE, OPEN APPROACH: ICD-10-PCS | Performed by: ORTHOPAEDIC SURGERY

## 2021-09-23 PROCEDURE — 250N000011 HC RX IP 250 OP 636: Performed by: ANESTHESIOLOGY

## 2021-09-23 PROCEDURE — 99223 1ST HOSP IP/OBS HIGH 75: CPT | Performed by: INTERNAL MEDICINE

## 2021-09-23 PROCEDURE — 999N000179 XR SURGERY CARM FLUORO LESS THAN 5 MIN W STILLS: Mod: TC

## 2021-09-23 PROCEDURE — 250N000013 HC RX MED GY IP 250 OP 250 PS 637: Performed by: INTERNAL MEDICINE

## 2021-09-23 PROCEDURE — 258N000003 HC RX IP 258 OP 636: Performed by: PHYSICIAN ASSISTANT

## 2021-09-23 PROCEDURE — 258N000003 HC RX IP 258 OP 636: Performed by: ANESTHESIOLOGY

## 2021-09-23 PROCEDURE — 120N000001 HC R&B MED SURG/OB

## 2021-09-23 PROCEDURE — 250N000013 HC RX MED GY IP 250 OP 250 PS 637: Performed by: PHYSICIAN ASSISTANT

## 2021-09-23 PROCEDURE — 8E0WXBF COMPUTER ASSISTED PROCEDURE OF TRUNK REGION, WITH FLUOROSCOPY: ICD-10-PCS | Performed by: ORTHOPAEDIC SURGERY

## 2021-09-23 PROCEDURE — 370N000017 HC ANESTHESIA TECHNICAL FEE, PER MIN: Performed by: ORTHOPAEDIC SURGERY

## 2021-09-23 PROCEDURE — C1713 ANCHOR/SCREW BN/BN,TIS/BN: HCPCS | Performed by: ORTHOPAEDIC SURGERY

## 2021-09-23 PROCEDURE — 250N000011 HC RX IP 250 OP 636: Performed by: PHYSICIAN ASSISTANT

## 2021-09-23 PROCEDURE — 86901 BLOOD TYPING SEROLOGIC RH(D): CPT | Performed by: ANESTHESIOLOGY

## 2021-09-23 PROCEDURE — 83735 ASSAY OF MAGNESIUM: CPT | Performed by: INTERNAL MEDICINE

## 2021-09-23 PROCEDURE — 272N000001 HC OR GENERAL SUPPLY STERILE: Performed by: ORTHOPAEDIC SURGERY

## 2021-09-23 PROCEDURE — 250N000009 HC RX 250: Performed by: NURSE ANESTHETIST, CERTIFIED REGISTERED

## 2021-09-23 PROCEDURE — 360N000085 HC SURGERY LEVEL 5 W/ FLUORO, PER MIN: Performed by: ORTHOPAEDIC SURGERY

## 2021-09-23 PROCEDURE — 250N000009 HC RX 250: Performed by: ANESTHESIOLOGY

## 2021-09-23 PROCEDURE — 250N000009 HC RX 250: Performed by: ORTHOPAEDIC SURGERY

## 2021-09-23 PROCEDURE — 999N000141 HC STATISTIC PRE-PROCEDURE NURSING ASSESSMENT: Performed by: ORTHOPAEDIC SURGERY

## 2021-09-23 PROCEDURE — 36415 COLL VENOUS BLD VENIPUNCTURE: CPT | Performed by: INTERNAL MEDICINE

## 2021-09-23 PROCEDURE — 0SG0071 FUSION OF LUMBAR VERTEBRAL JOINT WITH AUTOLOGOUS TISSUE SUBSTITUTE, POSTERIOR APPROACH, POSTERIOR COLUMN, OPEN APPROACH: ICD-10-PCS | Performed by: ORTHOPAEDIC SURGERY

## 2021-09-23 PROCEDURE — 84100 ASSAY OF PHOSPHORUS: CPT | Performed by: INTERNAL MEDICINE

## 2021-09-23 PROCEDURE — 710N000009 HC RECOVERY PHASE 1, LEVEL 1, PER MIN: Performed by: ORTHOPAEDIC SURGERY

## 2021-09-23 PROCEDURE — 250N000011 HC RX IP 250 OP 636: Performed by: INTERNAL MEDICINE

## 2021-09-23 PROCEDURE — 250N000013 HC RX MED GY IP 250 OP 250 PS 637: Performed by: ANESTHESIOLOGY

## 2021-09-23 PROCEDURE — HZ2ZZZZ DETOXIFICATION SERVICES FOR SUBSTANCE ABUSE TREATMENT: ICD-10-PCS | Performed by: INTERNAL MEDICINE

## 2021-09-23 PROCEDURE — 87205 SMEAR GRAM STAIN: CPT | Performed by: ORTHOPAEDIC SURGERY

## 2021-09-23 DEVICE — GRAFT BONE MAGNIFUSE 1CMX5CM 7509215: Type: IMPLANTABLE DEVICE | Site: SPINE LUMBAR | Status: FUNCTIONAL

## 2021-09-23 DEVICE — IMPLANTABLE DEVICE: Type: IMPLANTABLE DEVICE | Site: SPINE LUMBAR | Status: FUNCTIONAL

## 2021-09-23 RX ORDER — OXYCODONE HYDROCHLORIDE 5 MG/1
5 TABLET ORAL EVERY 4 HOURS PRN
Status: DISCONTINUED | OUTPATIENT
Start: 2021-09-23 | End: 2021-10-04

## 2021-09-23 RX ORDER — TRANEXAMIC ACID 10 MG/ML
1 INJECTION, SOLUTION INTRAVENOUS ONCE
Status: COMPLETED | OUTPATIENT
Start: 2021-09-23 | End: 2021-09-23

## 2021-09-23 RX ORDER — CEFAZOLIN SODIUM/WATER 2 G/20 ML
2 SYRINGE (ML) INTRAVENOUS
Status: COMPLETED | OUTPATIENT
Start: 2021-09-23 | End: 2021-09-23

## 2021-09-23 RX ORDER — METOPROLOL TARTRATE 1 MG/ML
1-2 INJECTION, SOLUTION INTRAVENOUS EVERY 5 MIN PRN
Status: DISCONTINUED | OUTPATIENT
Start: 2021-09-23 | End: 2021-09-23 | Stop reason: HOSPADM

## 2021-09-23 RX ORDER — ONDANSETRON 4 MG/1
4 TABLET, ORALLY DISINTEGRATING ORAL EVERY 30 MIN PRN
Status: DISCONTINUED | OUTPATIENT
Start: 2021-09-23 | End: 2021-09-23 | Stop reason: HOSPADM

## 2021-09-23 RX ORDER — GLYCOPYRROLATE 0.2 MG/ML
INJECTION, SOLUTION INTRAMUSCULAR; INTRAVENOUS PRN
Status: DISCONTINUED | OUTPATIENT
Start: 2021-09-23 | End: 2021-09-23

## 2021-09-23 RX ORDER — SODIUM CHLORIDE, SODIUM LACTATE, POTASSIUM CHLORIDE, CALCIUM CHLORIDE 600; 310; 30; 20 MG/100ML; MG/100ML; MG/100ML; MG/100ML
INJECTION, SOLUTION INTRAVENOUS CONTINUOUS
Status: DISCONTINUED | OUTPATIENT
Start: 2021-09-23 | End: 2021-09-23 | Stop reason: HOSPADM

## 2021-09-23 RX ORDER — FENTANYL CITRATE 50 UG/ML
50 INJECTION, SOLUTION INTRAMUSCULAR; INTRAVENOUS
Status: DISCONTINUED | OUTPATIENT
Start: 2021-09-23 | End: 2021-09-23 | Stop reason: HOSPADM

## 2021-09-23 RX ORDER — CEFAZOLIN SODIUM 1 G/50ML
1 INJECTION, SOLUTION INTRAVENOUS EVERY 8 HOURS
Status: COMPLETED | OUTPATIENT
Start: 2021-09-23 | End: 2021-09-24

## 2021-09-23 RX ORDER — NEOSTIGMINE METHYLSULFATE 1 MG/ML
VIAL (ML) INJECTION PRN
Status: DISCONTINUED | OUTPATIENT
Start: 2021-09-23 | End: 2021-09-23

## 2021-09-23 RX ORDER — HYDROMORPHONE HCL IN WATER/PF 6 MG/30 ML
0.4 PATIENT CONTROLLED ANALGESIA SYRINGE INTRAVENOUS
Status: DISCONTINUED | OUTPATIENT
Start: 2021-09-23 | End: 2021-10-04

## 2021-09-23 RX ORDER — CEFAZOLIN SODIUM/WATER 2 G/20 ML
2 SYRINGE (ML) INTRAVENOUS SEE ADMIN INSTRUCTIONS
Status: DISCONTINUED | OUTPATIENT
Start: 2021-09-23 | End: 2021-09-23 | Stop reason: HOSPADM

## 2021-09-23 RX ORDER — PROCHLORPERAZINE MALEATE 5 MG
5 TABLET ORAL EVERY 6 HOURS PRN
Status: DISCONTINUED | OUTPATIENT
Start: 2021-09-23 | End: 2021-10-08 | Stop reason: HOSPADM

## 2021-09-23 RX ORDER — VITAMIN B COMPLEX
25 TABLET ORAL 2 TIMES DAILY
Status: DISCONTINUED | OUTPATIENT
Start: 2021-09-23 | End: 2021-10-08 | Stop reason: HOSPADM

## 2021-09-23 RX ORDER — HALOPERIDOL 5 MG/ML
2.5-5 INJECTION INTRAMUSCULAR EVERY 6 HOURS PRN
Status: DISCONTINUED | OUTPATIENT
Start: 2021-09-23 | End: 2021-10-01

## 2021-09-23 RX ORDER — NALOXONE HYDROCHLORIDE 0.4 MG/ML
0.2 INJECTION, SOLUTION INTRAMUSCULAR; INTRAVENOUS; SUBCUTANEOUS
Status: DISCONTINUED | OUTPATIENT
Start: 2021-09-23 | End: 2021-10-04

## 2021-09-23 RX ORDER — OLANZAPINE 5 MG/1
5-10 TABLET, ORALLY DISINTEGRATING ORAL EVERY 6 HOURS PRN
Status: DISCONTINUED | OUTPATIENT
Start: 2021-09-23 | End: 2021-10-01

## 2021-09-23 RX ORDER — NALOXONE HYDROCHLORIDE 0.4 MG/ML
0.4 INJECTION, SOLUTION INTRAMUSCULAR; INTRAVENOUS; SUBCUTANEOUS
Status: DISCONTINUED | OUTPATIENT
Start: 2021-09-23 | End: 2021-10-04

## 2021-09-23 RX ORDER — LIDOCAINE 40 MG/G
CREAM TOPICAL
Status: DISCONTINUED | OUTPATIENT
Start: 2021-09-23 | End: 2021-09-23 | Stop reason: HOSPADM

## 2021-09-23 RX ORDER — ONDANSETRON 2 MG/ML
4 INJECTION INTRAMUSCULAR; INTRAVENOUS EVERY 6 HOURS PRN
Status: DISCONTINUED | OUTPATIENT
Start: 2021-09-23 | End: 2021-10-08 | Stop reason: HOSPADM

## 2021-09-23 RX ORDER — HYDROXYZINE HYDROCHLORIDE 10 MG/1
10 TABLET, FILM COATED ORAL EVERY 6 HOURS PRN
Status: DISCONTINUED | OUTPATIENT
Start: 2021-09-23 | End: 2021-10-04

## 2021-09-23 RX ORDER — ACETAMINOPHEN 325 MG/1
650 TABLET ORAL EVERY 4 HOURS PRN
Status: DISCONTINUED | OUTPATIENT
Start: 2021-09-26 | End: 2021-10-08 | Stop reason: HOSPADM

## 2021-09-23 RX ORDER — MAGNESIUM HYDROXIDE 1200 MG/15ML
LIQUID ORAL PRN
Status: DISCONTINUED | OUTPATIENT
Start: 2021-09-23 | End: 2021-09-23 | Stop reason: HOSPADM

## 2021-09-23 RX ORDER — PROPOFOL 10 MG/ML
INJECTION, EMULSION INTRAVENOUS PRN
Status: DISCONTINUED | OUTPATIENT
Start: 2021-09-23 | End: 2021-09-23

## 2021-09-23 RX ORDER — AMOXICILLIN 250 MG
1-2 CAPSULE ORAL 2 TIMES DAILY
Qty: 30 TABLET | Refills: 0 | Status: SHIPPED | OUTPATIENT
Start: 2021-09-23

## 2021-09-23 RX ORDER — HYDRALAZINE HYDROCHLORIDE 20 MG/ML
2.5-5 INJECTION INTRAMUSCULAR; INTRAVENOUS EVERY 10 MIN PRN
Status: DISCONTINUED | OUTPATIENT
Start: 2021-09-23 | End: 2021-09-23 | Stop reason: HOSPADM

## 2021-09-23 RX ORDER — HYDROMORPHONE HYDROCHLORIDE 1 MG/ML
0.5 INJECTION, SOLUTION INTRAMUSCULAR; INTRAVENOUS; SUBCUTANEOUS EVERY 10 MIN PRN
Status: DISCONTINUED | OUTPATIENT
Start: 2021-09-23 | End: 2021-09-23 | Stop reason: HOSPADM

## 2021-09-23 RX ORDER — CLONIDINE HYDROCHLORIDE 0.1 MG/1
0.1 TABLET ORAL EVERY 8 HOURS
Status: DISCONTINUED | OUTPATIENT
Start: 2021-09-23 | End: 2021-10-01

## 2021-09-23 RX ORDER — KETOROLAC TROMETHAMINE 15 MG/ML
15 INJECTION, SOLUTION INTRAMUSCULAR; INTRAVENOUS EVERY 6 HOURS PRN
Status: DISCONTINUED | OUTPATIENT
Start: 2021-09-23 | End: 2021-09-23 | Stop reason: HOSPADM

## 2021-09-23 RX ORDER — MULTIPLE VITAMINS W/ MINERALS TAB 9MG-400MCG
1 TAB ORAL DAILY
Status: DISCONTINUED | OUTPATIENT
Start: 2021-09-23 | End: 2021-09-24

## 2021-09-23 RX ORDER — GABAPENTIN 100 MG/1
100 CAPSULE ORAL
Status: COMPLETED | OUTPATIENT
Start: 2021-09-23 | End: 2021-09-23

## 2021-09-23 RX ORDER — OXYCODONE HYDROCHLORIDE 5 MG/1
10 TABLET ORAL EVERY 4 HOURS PRN
Status: DISCONTINUED | OUTPATIENT
Start: 2021-09-23 | End: 2021-10-04

## 2021-09-23 RX ORDER — ACETAMINOPHEN 325 MG/1
650 TABLET ORAL EVERY 6 HOURS PRN
Qty: 100 TABLET | Refills: 0 | Status: SHIPPED | OUTPATIENT
Start: 2021-09-23

## 2021-09-23 RX ORDER — FOLIC ACID 1 MG/1
1 TABLET ORAL DAILY
Status: DISCONTINUED | OUTPATIENT
Start: 2021-09-23 | End: 2021-09-24

## 2021-09-23 RX ORDER — ACETAMINOPHEN 325 MG/1
975 TABLET ORAL ONCE
Status: COMPLETED | OUTPATIENT
Start: 2021-09-23 | End: 2021-09-23

## 2021-09-23 RX ORDER — HYDROMORPHONE HCL IN WATER/PF 6 MG/30 ML
0.2 PATIENT CONTROLLED ANALGESIA SYRINGE INTRAVENOUS
Status: DISCONTINUED | OUTPATIENT
Start: 2021-09-23 | End: 2021-10-04

## 2021-09-23 RX ORDER — ALBUTEROL SULFATE 0.83 MG/ML
2.5 SOLUTION RESPIRATORY (INHALATION) EVERY 4 HOURS PRN
Status: DISCONTINUED | OUTPATIENT
Start: 2021-09-23 | End: 2021-09-23 | Stop reason: HOSPADM

## 2021-09-23 RX ORDER — HYDROXYZINE HYDROCHLORIDE 10 MG/1
10 TABLET, FILM COATED ORAL EVERY 6 HOURS PRN
Qty: 30 TABLET | Refills: 0 | Status: SHIPPED | OUTPATIENT
Start: 2021-09-23 | End: 2021-10-04

## 2021-09-23 RX ORDER — LABETALOL HYDROCHLORIDE 5 MG/ML
10 INJECTION, SOLUTION INTRAVENOUS EVERY 4 HOURS PRN
Status: DISCONTINUED | OUTPATIENT
Start: 2021-09-23 | End: 2021-09-23 | Stop reason: HOSPADM

## 2021-09-23 RX ORDER — LORAZEPAM 1 MG/1
1-2 TABLET ORAL EVERY 30 MIN PRN
Status: DISCONTINUED | OUTPATIENT
Start: 2021-09-23 | End: 2021-10-01

## 2021-09-23 RX ORDER — BUPIVACAINE HYDROCHLORIDE AND EPINEPHRINE 2.5; 5 MG/ML; UG/ML
INJECTION, SOLUTION EPIDURAL; INFILTRATION; INTRACAUDAL; PERINEURAL PRN
Status: DISCONTINUED | OUTPATIENT
Start: 2021-09-23 | End: 2021-09-23 | Stop reason: HOSPADM

## 2021-09-23 RX ORDER — OXYCODONE HYDROCHLORIDE 5 MG/1
5 TABLET ORAL EVERY 4 HOURS PRN
Status: DISCONTINUED | OUTPATIENT
Start: 2021-09-23 | End: 2021-09-23 | Stop reason: HOSPADM

## 2021-09-23 RX ORDER — ACETAMINOPHEN 325 MG/1
975 TABLET ORAL EVERY 8 HOURS
Status: COMPLETED | OUTPATIENT
Start: 2021-09-23 | End: 2021-09-26

## 2021-09-23 RX ORDER — ONDANSETRON 2 MG/ML
4 INJECTION INTRAMUSCULAR; INTRAVENOUS EVERY 30 MIN PRN
Status: DISCONTINUED | OUTPATIENT
Start: 2021-09-23 | End: 2021-09-23 | Stop reason: HOSPADM

## 2021-09-23 RX ORDER — DEXAMETHASONE SODIUM PHOSPHATE 4 MG/ML
INJECTION, SOLUTION INTRA-ARTICULAR; INTRALESIONAL; INTRAMUSCULAR; INTRAVENOUS; SOFT TISSUE PRN
Status: DISCONTINUED | OUTPATIENT
Start: 2021-09-23 | End: 2021-09-23

## 2021-09-23 RX ORDER — ONDANSETRON 2 MG/ML
INJECTION INTRAMUSCULAR; INTRAVENOUS PRN
Status: DISCONTINUED | OUTPATIENT
Start: 2021-09-23 | End: 2021-09-23

## 2021-09-23 RX ORDER — OXYCODONE HYDROCHLORIDE 5 MG/1
5-10 TABLET ORAL EVERY 4 HOURS PRN
Qty: 30 TABLET | Refills: 0 | Status: SHIPPED | OUTPATIENT
Start: 2021-09-23 | End: 2021-10-04

## 2021-09-23 RX ORDER — AMOXICILLIN 250 MG
1 CAPSULE ORAL 2 TIMES DAILY
Status: DISCONTINUED | OUTPATIENT
Start: 2021-09-23 | End: 2021-10-08 | Stop reason: HOSPADM

## 2021-09-23 RX ORDER — ONDANSETRON 4 MG/1
4 TABLET, ORALLY DISINTEGRATING ORAL EVERY 6 HOURS PRN
Status: DISCONTINUED | OUTPATIENT
Start: 2021-09-23 | End: 2021-10-08 | Stop reason: HOSPADM

## 2021-09-23 RX ORDER — CEFAZOLIN SODIUM 1 G/3ML
INJECTION, POWDER, FOR SOLUTION INTRAMUSCULAR; INTRAVENOUS PRN
Status: DISCONTINUED | OUTPATIENT
Start: 2021-09-23 | End: 2021-09-23

## 2021-09-23 RX ORDER — POLYETHYLENE GLYCOL 3350 17 G/17G
17 POWDER, FOR SOLUTION ORAL DAILY
Status: DISCONTINUED | OUTPATIENT
Start: 2021-09-24 | End: 2021-10-08 | Stop reason: HOSPADM

## 2021-09-23 RX ORDER — LANOLIN ALCOHOL/MO/W.PET/CERES
100 CREAM (GRAM) TOPICAL DAILY
Status: DISCONTINUED | OUTPATIENT
Start: 2021-09-23 | End: 2021-09-24

## 2021-09-23 RX ORDER — LORAZEPAM 2 MG/ML
1-2 INJECTION INTRAMUSCULAR EVERY 30 MIN PRN
Status: DISCONTINUED | OUTPATIENT
Start: 2021-09-23 | End: 2021-10-01

## 2021-09-23 RX ORDER — FENTANYL CITRATE 50 UG/ML
50 INJECTION, SOLUTION INTRAMUSCULAR; INTRAVENOUS EVERY 5 MIN PRN
Status: DISCONTINUED | OUTPATIENT
Start: 2021-09-23 | End: 2021-09-23 | Stop reason: HOSPADM

## 2021-09-23 RX ORDER — LIDOCAINE 40 MG/G
CREAM TOPICAL
Status: DISCONTINUED | OUTPATIENT
Start: 2021-09-23 | End: 2021-10-08 | Stop reason: HOSPADM

## 2021-09-23 RX ORDER — BISACODYL 10 MG
10 SUPPOSITORY, RECTAL RECTAL DAILY PRN
Status: DISCONTINUED | OUTPATIENT
Start: 2021-09-23 | End: 2021-10-08 | Stop reason: HOSPADM

## 2021-09-23 RX ORDER — SODIUM CHLORIDE 9 MG/ML
INJECTION, SOLUTION INTRAVENOUS CONTINUOUS
Status: DISCONTINUED | OUTPATIENT
Start: 2021-09-23 | End: 2021-09-27

## 2021-09-23 RX ORDER — FLUMAZENIL 0.1 MG/ML
0.2 INJECTION, SOLUTION INTRAVENOUS
Status: DISCONTINUED | OUTPATIENT
Start: 2021-09-23 | End: 2021-09-24

## 2021-09-23 RX ORDER — MEPERIDINE HYDROCHLORIDE 25 MG/ML
12.5 INJECTION INTRAMUSCULAR; INTRAVENOUS; SUBCUTANEOUS
Status: DISCONTINUED | OUTPATIENT
Start: 2021-09-23 | End: 2021-09-23 | Stop reason: HOSPADM

## 2021-09-23 RX ORDER — FENTANYL CITRATE 50 UG/ML
INJECTION, SOLUTION INTRAMUSCULAR; INTRAVENOUS PRN
Status: DISCONTINUED | OUTPATIENT
Start: 2021-09-23 | End: 2021-09-23

## 2021-09-23 RX ORDER — OXYCODONE HYDROCHLORIDE 5 MG/1
5 TABLET ORAL EVERY 4 HOURS PRN
Status: DISCONTINUED | OUTPATIENT
Start: 2021-09-23 | End: 2021-09-23 | Stop reason: ALTCHOICE

## 2021-09-23 RX ORDER — HYDRALAZINE HYDROCHLORIDE 20 MG/ML
10 INJECTION INTRAMUSCULAR; INTRAVENOUS EVERY 4 HOURS PRN
Status: DISCONTINUED | OUTPATIENT
Start: 2021-09-23 | End: 2021-10-08 | Stop reason: HOSPADM

## 2021-09-23 RX ORDER — VITAMIN B COMPLEX
25 TABLET ORAL 2 TIMES DAILY
Qty: 180 TABLET | Refills: 0 | Status: SHIPPED | OUTPATIENT
Start: 2021-09-23

## 2021-09-23 RX ADMIN — ROCURONIUM BROMIDE 10 MG: 10 INJECTION INTRAVENOUS at 08:26

## 2021-09-23 RX ADMIN — Medication 25 MCG: at 20:40

## 2021-09-23 RX ADMIN — PROPOFOL 50 MCG/KG/MIN: 10 INJECTION, EMULSION INTRAVENOUS at 08:08

## 2021-09-23 RX ADMIN — ACETAMINOPHEN 975 MG: 325 TABLET, FILM COATED ORAL at 12:55

## 2021-09-23 RX ADMIN — FENTANYL CITRATE 150 MCG: 50 INJECTION, SOLUTION INTRAMUSCULAR; INTRAVENOUS at 08:01

## 2021-09-23 RX ADMIN — MIDAZOLAM 2 MG: 1 INJECTION INTRAMUSCULAR; INTRAVENOUS at 08:01

## 2021-09-23 RX ADMIN — HYDROMORPHONE HYDROCHLORIDE 1 MG: 1 INJECTION, SOLUTION INTRAMUSCULAR; INTRAVENOUS; SUBCUTANEOUS at 08:26

## 2021-09-23 RX ADMIN — NEOSTIGMINE METHYLSULFATE 3 MG: 1 INJECTION, SOLUTION INTRAVENOUS at 10:16

## 2021-09-23 RX ADMIN — SODIUM CHLORIDE, POTASSIUM CHLORIDE, SODIUM LACTATE AND CALCIUM CHLORIDE: 600; 310; 30; 20 INJECTION, SOLUTION INTRAVENOUS at 09:52

## 2021-09-23 RX ADMIN — FENTANYL CITRATE 50 MCG: 50 INJECTION, SOLUTION INTRAMUSCULAR; INTRAVENOUS at 11:23

## 2021-09-23 RX ADMIN — OXYCODONE HYDROCHLORIDE 5 MG: 5 TABLET ORAL at 16:52

## 2021-09-23 RX ADMIN — HYDROXYZINE HYDROCHLORIDE 10 MG: 10 TABLET, FILM COATED ORAL at 22:04

## 2021-09-23 RX ADMIN — ROCURONIUM BROMIDE 10 MG: 10 INJECTION INTRAVENOUS at 08:55

## 2021-09-23 RX ADMIN — ROCURONIUM BROMIDE 50 MG: 10 INJECTION INTRAVENOUS at 08:01

## 2021-09-23 RX ADMIN — SENNOSIDES AND DOCUSATE SODIUM 1 TABLET: 50; 8.6 TABLET ORAL at 20:40

## 2021-09-23 RX ADMIN — PROPOFOL 200 MG: 10 INJECTION, EMULSION INTRAVENOUS at 08:01

## 2021-09-23 RX ADMIN — TRANEXAMIC ACID 1 G: 10 INJECTION, SOLUTION INTRAVENOUS at 08:45

## 2021-09-23 RX ADMIN — Medication 2 G: at 08:07

## 2021-09-23 RX ADMIN — CEFAZOLIN SODIUM 1 G: 1 INJECTION, SOLUTION INTRAVENOUS at 16:41

## 2021-09-23 RX ADMIN — SODIUM CHLORIDE, POTASSIUM CHLORIDE, SODIUM LACTATE AND CALCIUM CHLORIDE: 600; 310; 30; 20 INJECTION, SOLUTION INTRAVENOUS at 07:14

## 2021-09-23 RX ADMIN — CEFAZOLIN 1 G: 1 INJECTION, POWDER, FOR SOLUTION INTRAMUSCULAR; INTRAVENOUS at 10:18

## 2021-09-23 RX ADMIN — MULTIPLE VITAMINS W/ MINERALS TAB 1 TABLET: TAB at 16:41

## 2021-09-23 RX ADMIN — Medication 1 TABLET: at 16:41

## 2021-09-23 RX ADMIN — FENTANYL CITRATE 50 MCG: 50 INJECTION, SOLUTION INTRAMUSCULAR; INTRAVENOUS at 08:44

## 2021-09-23 RX ADMIN — ONDANSETRON HYDROCHLORIDE 4 MG: 2 INJECTION, SOLUTION INTRAVENOUS at 10:06

## 2021-09-23 RX ADMIN — LORAZEPAM 2 MG: 1 TABLET ORAL at 19:10

## 2021-09-23 RX ADMIN — LORAZEPAM 2 MG: 2 INJECTION INTRAMUSCULAR; INTRAVENOUS at 23:43

## 2021-09-23 RX ADMIN — LORAZEPAM 1 MG: 1 TABLET ORAL at 22:54

## 2021-09-23 RX ADMIN — DEXAMETHASONE SODIUM PHOSPHATE 10 MG: 4 INJECTION, SOLUTION INTRA-ARTICULAR; INTRALESIONAL; INTRAMUSCULAR; INTRAVENOUS; SOFT TISSUE at 08:15

## 2021-09-23 RX ADMIN — DEXMEDETOMIDINE HYDROCHLORIDE 0.2 MCG/KG/HR: 100 INJECTION, SOLUTION INTRAVENOUS at 08:08

## 2021-09-23 RX ADMIN — GABAPENTIN 100 MG: 100 CAPSULE ORAL at 06:59

## 2021-09-23 RX ADMIN — CLONIDINE HYDROCHLORIDE 0.1 MG: 0.1 TABLET ORAL at 16:41

## 2021-09-23 RX ADMIN — THIAMINE HCL TAB 100 MG 100 MG: 100 TAB at 16:41

## 2021-09-23 RX ADMIN — FENTANYL CITRATE 50 MCG: 50 INJECTION, SOLUTION INTRAMUSCULAR; INTRAVENOUS at 12:14

## 2021-09-23 RX ADMIN — SODIUM CHLORIDE, POTASSIUM CHLORIDE, SODIUM LACTATE AND CALCIUM CHLORIDE: 600; 310; 30; 20 INJECTION, SOLUTION INTRAVENOUS at 12:41

## 2021-09-23 RX ADMIN — LABETALOL HYDROCHLORIDE 10 MG: 5 INJECTION, SOLUTION INTRAVENOUS at 12:13

## 2021-09-23 RX ADMIN — GLYCOPYRROLATE 0.6 MG: 0.2 INJECTION, SOLUTION INTRAMUSCULAR; INTRAVENOUS at 10:16

## 2021-09-23 RX ADMIN — ACETAMINOPHEN 975 MG: 325 TABLET, FILM COATED ORAL at 22:04

## 2021-09-23 RX ADMIN — LIDOCAINE HYDROCHLORIDE 60 MG: 10 INJECTION, SOLUTION EPIDURAL; INFILTRATION; INTRACAUDAL; PERINEURAL at 08:01

## 2021-09-23 RX ADMIN — SODIUM CHLORIDE: 9 INJECTION, SOLUTION INTRAVENOUS at 13:58

## 2021-09-23 ASSESSMENT — ACTIVITIES OF DAILY LIVING (ADL)
ADLS_ACUITY_SCORE: 7
ADLS_ACUITY_SCORE: 7

## 2021-09-23 ASSESSMENT — MIFFLIN-ST. JEOR: SCORE: 1570.87

## 2021-09-23 NOTE — ANESTHESIA CARE TRANSFER NOTE
Patient: Armen Phelps    Procedure(s):  Lumbar 1-2 Posterior Lumbar Intrumented Fusion With revision Decompression including discectomy    Diagnosis: Lumbar spinal stenosis [M48.061]  Neurogenic claudication [M48.062]  Recurrent herniation of lumbar disc [M51.26]  Diagnosis Additional Information: No value filed.    Anesthesia Type:   General     Note:    Oropharynx: oropharynx clear of all foreign objects  Level of Consciousness: drowsy  Oxygen Supplementation: face mask    Independent Airway: airway patency satisfactory and stable  Dentition: dentition unchanged      Patient transferred to: PACU    Handoff Report: Identifed the Patient, Identified the Reponsible Provider, Reviewed the pertinent medical history, Discussed the surgical course, Reviewed Intra-OP anesthesia mangement and issues during anesthesia, Set expectations for post-procedure period and Allowed opportunity for questions and acknowledgement of understanding      Vitals:  Vitals Value Taken Time   /87 09/23/21 1051   Temp 97.2  F (36.2  C) 09/23/21 1051   Pulse 76 09/23/21 1055   Resp 6 09/23/21 1055   SpO2 100 % 09/23/21 1055   Vitals shown include unvalidated device data.    Electronically Signed By: MELISA Mercado CRNA  September 23, 2021  10:56 AM

## 2021-09-23 NOTE — PLAN OF CARE
Pt arrived from pacu on a cart, assisted into bed with air citlaly. IV infusing into right arm, O2 at 2L piggybacked into capno tubing and those readings low at first better once more awake. Reed catheter and hemavac drain in place and patent. Aquacel dressing to back incision clean, dry and intact. Guaze dressing over the drain exit site clean and dry as well. Drain empty ed of 80cc of bloody drainage. Frequent VS started BPs quite high,Dr aware. Pt instructed to do postop exercises with each VS check. Oriented to room, equipment, orders and floor routines. PT and OT starts tomorrow so pt told to rest and recover from anesthesia. Spouse here and told hospitalist that he does drink a lot of ETOH and has been treated for withdrawal before so CIWA scale started and withdrawal orders placed. Unsure of discharge plans as of yet.

## 2021-09-23 NOTE — PROGRESS NOTES
Patient is a daily ETOH users and his last drink was 1 1/2 days ago.  Patient is at high risk for withdrawal and delirium according to preop H&P.  Discussed with MDA and she reports that he should most likely be put on CIWA protocol while hospitalized post surgery.  PACU charge ELVIRA Moreira notified of patient status.   Irene Farrar RN on 9/23/2021 at 7:24 AM

## 2021-09-23 NOTE — ANESTHESIA POSTPROCEDURE EVALUATION
Patient: Armen Phelps    Procedure(s):  Lumbar 1-2 Posterior Lumbar Intrumented Fusion With revision Decompression including discectomy    Diagnosis:Lumbar spinal stenosis [M48.061]  Neurogenic claudication [M48.062]  Recurrent herniation of lumbar disc [M51.26]  Diagnosis Additional Information: No value filed.    Anesthesia Type:  General    Note:  Disposition: Admission   Postop Pain Control: Uneventful            Sign Out: Well controlled pain   PONV: No   Neuro/Psych: Uneventful            Sign Out: Acceptable/Baseline neuro status   Airway/Respiratory: Uneventful            Sign Out: Acceptable/Baseline resp. status   CV/Hemodynamics: Uneventful            Sign Out: Acceptable CV status; No obvious hypovolemia; No obvious fluid overload   Other NRE: NONE   DID A NON-ROUTINE EVENT OCCUR? No           Last vitals:  Vitals Value Taken Time   /104 09/23/21 1300   Temp 97.6  F (36.4  C) 09/23/21 1300   Pulse 69 09/23/21 1314   Resp 11 09/23/21 1314   SpO2 99 % 09/23/21 1314   Vitals shown include unvalidated device data.    Electronically Signed By: Amanda Lynn MD  September 23, 2021  1:15 PM

## 2021-09-23 NOTE — DISCHARGE INSTRUCTIONS
Incision Instructions     Your incision is covered with an Aquacel dressing. This is a waterproof dressing that you are able to shower with. Do not submerge your dressing in water. Do not remove dressing until you are seen in clinic for your two week post op visit.     However, if you notice a significant amount of drainage on your dressing, or there is any concern for possible incision infection, remove to inspect the incision.    If you do remove the dressing prior to your two week visit, please cover with simple dressing. We suggest a folded piece of gauze with a few pieces of tape to hold in place. This will allow the incision to remain protected. Please make sure to cover your dressing with plastic/waterproof dressing to keep it waterproof if you have removed the initial dressing while in the shower. We ask that you continue to waterproof it until you are seen at your two week post op appointment.     Activity Instructions   Minimize bending, lifting, twisting. No lifting greater than 10 lbs. Remember, 1 gallon of milk is 8 lbs.    Please call our office at [phone] should you develop the following issues:  1.) Increased/persistent redness, bleeding, localized warmth, increased swelling, and/or drainage (yellow/clear/odorous) incision site.   2.) Increased pain not controlled with oral pain medications   3.) Persistent headache, dizziness, lightheaded  4.) Persistent constipation despite taking OTC stool softeners as directed  5.) Calf pain/swollen/hard/warm area, swelling chest pain or shortness of breath  6.) Increased/persistent numbness or tingling in arm or legs, weakness in extremities or falls  7.) Generalized feelings of illness  8.) Persistent fever, chills, sweats, Temp 101 or greater  9.) Trouble voiding, incontinence of bowel and/or bladder  10.) Too sleepy-could be amount of pain medication  11.) If unable to wake call 911    Other instructions:  1.) No heavy lifting, nothing more than 6-10lbs.  Minimize your bending, lifting, and twisting. Attempt to avoid prolonged period of sitting. Follow physical therapy restrictions and exercises - slowly increase your activity.   2.) Avoid sitting or laying in one position too long, walk as tolerated, log roll  3.) Wear brace when up per physical therapy, inspect skin under brace daily and call md if sore area starts  4.) Take an over the counter stool softener as directed while on narcotics to prevent constipation or to stay regular. Take a suppository or laxative if no bowel movement in 2 days despite taking softener     Follow Up   Follow up with Wilder Guy PA-C in two weeks at [facility]. Please call Jeremiah [phone] to schedule.

## 2021-09-23 NOTE — CONSULTS
Hospitalist Consultation      Armen Phelps MRN# [id number]   YOB: 1945 Age: 76 year old   Date of Admission: 9/23/2021     Requesting Physician:Dr. Lenny Lindsay  Reason for consult: Medical Management           Assessment and Plan:   76-year old male Mr. Armen Phelps with past medical history significant for hypertension, hyperlipidemia, GERD, alcohol dependence admitted for elective L1-2 posterior lumbar fusion with revision decompression and discectomy.  Team consulted for comanagement    Status post L1-L2 fusion revision decompression and discectomy  -Postop day #0  -Postop pain management anticoagulation physical therapy per primary team  -Continue capnography    Hypertension  -Prior to admission patient was on lisinopril but was discontinued due to renal insufficiency  -As needed hydralazine available    Hyperlipidemia  -Continue statins    GERD  -Continue PPI    Patient at risk for alcohol withdrawal  -Daily for bouts of vodka  -No history of alcohol-related seizure disorder  -I will place him on CIWA protocol  -Thiamine folate multivitamin  -Lorazepam as needed for trigger based on CIWA score  -Monitor heart rate    Thank you for the consult will continue to follow along             History of Present Illness:   This patient is a 76 year old male with past medical history significant for hypertension, hyperlipidemia, GERD, chronic back pain debilitating prior surgeries presented for elective fusion of L1-L2 decompression and discectomy.  Prior to surgery patient did have significant pain about 8 out of 10 in intensity.  It affected his ADLs.  Underwent procedure earlier today.  Tolerated procedure well.  Pain is well controlled at this time.  Patient is somewhat sleepy but more than 10 point review of systems was carried out was otherwise negative.    Patient's wife there may be concern for alcohol withdrawal given the patient is a daily user of alcohol about 4 drinks of vodka every day.   Denies any known history of alcohol-related withdrawal seizures.  Denies any chest pain shortness of breath lightheadedness dizziness.  Patient is non-smoker.              Past Medical History:     Past Medical History:   Diagnosis Date     GERD (gastroesophageal reflux disease)      Hyperlipidemia      Hypertension      Other chronic pain     LOW BACK     Spinal stenosis of lumbar region              Past Surgical History:     Past Surgical History:   Procedure Laterality Date     BILATERAL LUMBAR LAMINECTOMY L2-3 and L3-4 WITH LEFT DISCECTOMY AND EXCISION OF LEFT CYST AT L5-S1  12/2013     CHOLECYSTECTOMY  1997               Social History:     Social History     Tobacco Use     Smoking status: Former Smoker     Smokeless tobacco: Never Used   Substance Use Topics     Alcohol use: Yes     Comment: daily 2 wine             Family History:   History reviewed. No pertinent family history.          Allergies:     Allergies   Allergen Reactions     Adhesive Tape Hives and Rash     Developed open sores after last spine surgery.             Medications:     Medications Prior to Admission   Medication Sig Dispense Refill Last Dose     acetaminophen (TYLENOL) 325 MG tablet Take 325-650 mg by mouth every 6 hours as needed for mild pain   9/22/2021 at Unknown time     cyanocobalamin (CYANOCOBALAMIN) 1000 MCG/ML injection Inject 1 mL (1,000 mcg) into the muscle every 21 days   9/2/2021     Cyanocobalamin (VITAMIN B 12 PO) Take 2,500 mcg by mouth daily   9/22/2021 at Unknown time     multivitamin w/minerals (MULTI-VITAMIN) tablet Take 2 tablets by mouth daily    9/20/2021 at Unknown time     ondansetron (ZOFRAN-ODT) 4 MG ODT tab Take 1 tablet (4 mg) by mouth every 8 hours as needed for nausea 90 tablet 3 More than a month at Unknown time     pantoprazole (PROTONIX) 40 MG EC tablet Take 1 tablet (40 mg) by mouth every evening 90 tablet 1 9/22/2021 at Unknown time     Probiotic Product (PROBIOTIC-10 PO) Take 1 capsule by mouth  "daily    9/22/2021 at Unknown time     QUEtiapine (SEROQUEL) 25 MG tablet Take 1 tablet (25 mg) by mouth At Bedtime 90 tablet 1 9/22/2021 at Unknown time     simvastatin (ZOCOR) 80 MG tablet Take 1 tablet (80 mg) by mouth every evening (Patient taking differently: Take 40 mg by mouth every evening Takes 1/2 of a 80 mg) 90 tablet 1 9/22/2021 at Unknown time     testosterone cypionate (DEPOTESTOSTERONE) 100 MG/ML injection Inject 50 mg into the muscle every 21 days Last dose 9/9/21 9/2/2021 at Unknown time     Vitamin D3 (CHOLECALCIFEROL) 125 MCG (5000 UT) tablet Take 125 mcg by mouth daily   9/22/2021 at Unknown time               Review of Systems:   A comprehensive greater than 10 system review of systems was carried out.  Pertinent positives and negatives are noted above.  Otherwise negative for contributory info.            Physical Exam:   Vitals were reviewed  Blood pressure (!) 153/94, pulse 67, temperature 97.4  F (36.3  C), temperature source Temporal, resp. rate 9, height 1.778 m (5' 10\"), weight 83.5 kg (184 lb), SpO2 98 %.  Exam:   GENERAL:  Comfortable.  Sleepy  PSYCH: pleasant, oriented, No acute distress.  EYES: PERRLA, Normal conjunctiva.  HEART:  Normal S1, S2 with no edema.  LUNGS:  Clear to auscultation, normal Respiratory effort.  ABDOMEN:  Soft, no hepatosplenomegaly, normal bowel sounds.  SKIN:  Dry to touch, No rash.  Neuro: Non focal with normal motor power           Data:   Past 24 hours labs, studies, and imaging were reviewed.  All laboratory data reviewed  All laboratory and imaging data in the past 24 hours reviewed  "

## 2021-09-23 NOTE — OP NOTE
Procedure Date: 09/23/2021    PREOPERATIVE DIAGNOSES:     1.  Status post L1 to L2 laminectomy and diskectomy performed a couple months ago by myself.  He did well immediately postoperatively, but unfortunately sustained a fall while at home resulting in recurrent severe back and claudicatory pain.  2.  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  3.  History of multiple back surgeries at other levels.  4.  History of alcoholism, active.  5.  History of ground level falls.    POSTOPERATIVE DIAGNOSES:     1.  Status post L1 to L2 laminectomy and diskectomy performed a couple months ago by myself.  He did well immediately postoperatively, but unfortunately sustained a fall while at home resulting in recurrent severe back and claudicatory pain.  2.  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  3.  History of multiple back surgeries at other levels.  4.  History of alcoholism, active.  5.  History of ground level falls.    PROCEDURES PERFORMED:    1.  L1 to L2 posterior fusion.  2.  Posterior instrumentation using a bilateral pedicle screw and pily construct spanning from L1 to L2 for fusion purposes.  3.  Revision decompression L1.  4.  Revision decompression L2.  5.  L1 to L2 diskectomy, following a left-sided partial facetectomy.  6.  Local autograft bone.  7.  Allograft bone with a 1 x 5 Medtronic MagniFuse.  8.  Fluoroscopy.  9.  Medtronic O-arm.    SURGEON:  Lenny Lindsay MD    ASSISTANT:  Wilder Guy PA-C    ANESTHESIA:  General, without complication.    COMPLICATIONS:  None.    DRAINS:  One Hemovac drain placed prior to closure.    ESTIMATED BLOOD LOSS:  100 mL.    FINDINGS:  Full decompression L1 to L2 with a solid fusion construct and take-down to the left L1 to L2 facet as planned.  Large florid recurrent disk herniation.  Small amount of relatively serous fluid, sent for cultures and Gram stain empirically.    INDICATIONS FOR PROCEDURE:  Mr. Phelps is a 76-year-old male who I had performed a L1 to L2  decompression and diskectomy on in the last couple of months due to a large disk herniation.  He did well immediately postoperatively, but unfortunately sustained a ground level fall.  This led to the immediate onset of recurrent symptoms of claudicatory pain and severe back pain.  He was worked up with a postoperative MRI with and without contrast, demonstrating a recurrent disk herniation versus other fluid collection such as seroma, hematoma, etc.  Based on his relentless symptoms and his MRI findings, we discussed the above-mentioned procedures, possible means to improve or alleviate his symptoms.  We discussed risks, benefits and alternatives and ultimately he elected to proceed.  He had been presented with a consent form, which was read, understood and signed.  All questions were answered.  He was referred for preoperative H and P and COVID testing.    Mr. Phelps does have a history of active alcoholism and ground level falls.  His previous surgery was performed outpatient with a 1-night overnight stay in the care suite; however, at this point in time based on the fusion status and his other medical history, surgery is being performed in an inpatient hospital setting.    DESCRIPTION OF PROCEDURE:  On the date of procedure, he was seen in the preoperative area.  Questions were answered.  Skin was marked.  Consent was signed by both parties.  After finding no contraindications to proceed with surgery in the preoperative anesthesia assessment, he was brought back to the operating room.  In the OR, he was successfully sedated and an ET tube was placed.  Reed catheter was placed under sterile conditions.  He was then repositioned into a prone position on a Stu table.  Eyes were free of compression.  SCDs were in place.  Shoulders and elbows were at 90 degrees with the shoulders slightly flexed forward.  The lumbar region was clipped, prepped and draped in standard fashion.  A timeout was performed and IV  antibiotics were administered.    We localized over his previous incision, which was marked.  This area was infiltrated with Marcaine mixed with epinephrine.  We created our midline incision and dissected down to the lumbar fascia, which was split in the midline.  We identified the presumed to T12 spinous process.  A Kocher clamp was placed and lateral fluoroscopy was used to reassure our operative levels.  We then continued by reentering the wound and continuing our exposure with that nomenclature in mind.  The L1 and L2 transverse processes were exposed with Gelpis in the wound.  We then placed a spinous process clamp at T12.  The Wowboard O-arm was brought into the room and a spin was completed.  Information was successfully transferred to the [employer] system.    We then reentered the wound with a Stealth-guided drill.  We made our  holes for our pedicle screws (4 screws).  These were advanced somewhat through the pedicle and into the vertebral body with a straight thoracic probe.  The bilateral gutters were decorticated at that point with a high-speed bur and packed with a 1 x 5 MagniFuse on each side for posterolateral fusion purposes.  This completed our fusion process.    We then moved onto our instrumentation.  Today, we used 6.5 x 45 mm Globus CREO HA coated screws.  These were placed through the pedicle into the vertebral body all successfully and with solid endpoints using the Stealth guidance.    We then moved on to revision decompression.  We were able to nicely identify our landmarks with a passive planar probe and Stealth, identifying our remaining bony components.  We essentially re-exposed the remnants of the L1 lamina as well as the L2 lamina down to the extents next to the dura.  We then performed a revision decompression with a Tabithaell rongeur and Kerrison rongeurs.  I worked from the patient's left side, where the recurrent disk herniation was noted to be.  A great deal of the left L1 to L2  facet joint was taken down safely to expose the lateral aspect of the thecal sac.  I was then able to work my way down with a dull nerve hook to expose large florid amounts of recurrent herniated disk.  With this material removed and exposed with a pituitary, this nicely freed up the thecal sac to its normal position.  This essentially completed our revision diskectomy process as well.    It should be noted that upon exposure earlier in the case and while moving through the lumbar fascia, there was a small area of minimally pressurized fluid.  This was straw-colored and serous in appearance.  This fluid was sent for cultures empirically, although really there is not much for clinical suspicion of underlying infection at this point in time.    We then completed our instrumentation.  We used lordotic 45 mm length x 5 mm diameter titanium rods; these spanned from L1 to L2.  Four end caps were applied and final tightened, to complete our instrumentation.  We had also performed a revision decompression at L1 and L2 with revision diskectomy.  The bone graft that had been taken down mainly from the facet joint and remnants of L1 and L2 spinous process was cleaned on the back table and reintroduced as autograft into the bilateral gutters.  The wound was copiously irrigated and suctioned.  Final x-rays were obtained and saved.  A drain was placed deep to the lumbar fascia, taken out a separate poke hole, and tied to the skin with nylon stitch to prevent back out.  Gelpi retractors were removed.  There was no evidence of ongoing bleeding.  All counts were correct.  No complications otherwise identified.  The wound was then closed in layers including the lumbar fascia, subcutaneous tissue and skin.  The wound was cleaned and dried.  Dermabond was applied.  Sterile dressings were applied.  Drapes were taken down.  He was then rolled back into supine position, extubated, and brought to the PACU in stable condition.    Wilder  ANDRZEJ Guy, was present through the entire procedure.  He was present from start to finish and absolutely necessary.  We did use Elastagen O-arm and fluoroscopy today.  Autograft and allograft were used.  We used CREO screws.  We did not place an interbody today, as we simply performed a posterolateral instrumented fusion with revision decompression and revision diskectomy.  No complications.  Counts were correct prior to closure.    Lenny Lindsay MD        D: 2021   T: 2021   MT: [id number]/DCQA    Name:     DEX FONSECALourdes  MRN:      [id number]        Account:        [id number]   :      1945           Procedure Date: 2021     Document: [id number]

## 2021-09-23 NOTE — ANESTHESIA PREPROCEDURE EVALUATION
Anesthesia Pre-Procedure Evaluation    Patient: Armen Phelps   MRN: [id number] : 1945        Preoperative Diagnosis: Lumbar spinal stenosis [M48.061]  Neurogenic claudication [M48.062]  Recurrent herniation of lumbar disc [M51.26]   Procedure : Procedure(s):  Lumbar 1-2 Posterior Lumbar Intrumented Fusion With or Without Interbody Cage and With or Without Laminectomies, With Possible Decompression and Possible Irrigation and Debridement     Past Medical History:   Diagnosis Date     GERD (gastroesophageal reflux disease)      Hyperlipidemia      Hypertension      Other chronic pain     LOW BACK     Spinal stenosis of lumbar region       Past Surgical History:   Procedure Laterality Date     BILATERAL LUMBAR LAMINECTOMY L2-3 and L3-4 WITH LEFT DISCECTOMY AND EXCISION OF LEFT CYST AT L5-S1  2013     CHOLECYSTECTOMY        Allergies   Allergen Reactions     Adhesive Tape Hives and Rash     Developed open sores after last spine surgery.      Social History     Tobacco Use     Smoking status: Former Smoker     Smokeless tobacco: Never Used   Substance Use Topics     Alcohol use: Yes     Comment: daily 2 wine      Wt Readings from Last 1 Encounters:   21 83.5 kg (184 lb)        Anesthesia Evaluation            ROS/MED HX  ENT/Pulmonary:       Neurologic: Comment: Alcoholism >2 bottles of wine per night  History of withdrawal  Last drink night before last      Cardiovascular:     (+) hypertension-----    METS/Exercise Tolerance:     Hematologic:       Musculoskeletal:       GI/Hepatic:       Renal/Genitourinary:     (+) renal disease, type: CRI,     Endo:       Psychiatric/Substance Use:       Infectious Disease:       Malignancy:       Other:            Physical Exam    Airway        Mallampati: II   TM distance: > 3 FB   Neck ROM: full     Respiratory Devices and Support         Dental           Cardiovascular   cardiovascular exam normal          Pulmonary   pulmonary exam normal                 OUTSIDE LABS:  CBC:   Lab Results   Component Value Date    WBC 5.4 07/15/2021    WBC 4.9 10/23/2020    HGB 13.6 07/15/2021    HGB 13.5 10/23/2020    HCT 43.0 07/15/2021    HCT 42.5 10/23/2020     07/15/2021     10/23/2020     BMP:   Lab Results   Component Value Date    .0 07/15/2021    .8 (A) 04/26/2021    POTASSIUM 3.68 07/15/2021    POTASSIUM 3.55 04/26/2021    CHLORIDE 104.6 07/15/2021    CHLORIDE 92.0 (A) 04/26/2021    CO2 30.4 07/15/2021    CO2 28.2 04/26/2021    BUN 6 (A) 07/15/2021    BUN 6.3 07/15/2021    CR 0.96 07/15/2021    CR 1.61 (A) 04/26/2021     (A) 07/15/2021     (A) 04/26/2021     COAGS: No results found for: PTT, INR, FIBR  POC: No results found for: BGM, HCG, HCGS  HEPATIC:   Lab Results   Component Value Date    ALBUMIN 4.0 06/17/2020    PROTTOTAL 6.2 06/17/2020    ALT 29 07/09/2019    AST 35 07/09/2019    ALKPHOS 60 06/17/2020    BILITOTAL 1.0 06/17/2020     OTHER:   Lab Results   Component Value Date    A1C 4.9 08/05/2020    DESTINEE 9.3 07/15/2021    LIPASE 135 10/20/2018    TSH 1.54 06/17/2020    SED 5 05/13/2021       Anesthesia Plan    ASA Status:  3   NPO Status:  NPO Appropriate    Anesthesia Type: General.     - Airway: ETT   Induction: Intravenous.           Consents    Anesthesia Plan(s) and associated risks, benefits, and realistic alternatives discussed. Questions answered and patient/representative(s) expressed understanding.     - Discussed with:  Patient         Postoperative Care       PONV prophylaxis: Ondansetron (or other 5HT-3)     Comments:    Patient at elevated risk for alcohol withdrawal            Amanda Lynn MD

## 2021-09-23 NOTE — BRIEF OP NOTE
[facility] Brief Operative Note    Pre-operative diagnosis: L1-L2 Lumbar spinal stenosis [M48.061]  Neurogenic claudication [M48.062]  Recurrent herniation of lumbar disc [M51.26]   Post-operative diagnosis * No post-op diagnosis entered *  same   Procedure: Procedure(s):  Lumbar 1-2 Posterior Lumbar Intrumented Fusion With revision Decompression including discectomy   Surgeon(s): Surgeon(s) and Role:     * Lenny Lindsay MD - Primary     * Wilder Guy PA-C - Assisting   Estimated blood loss: 100 mL    Specimens: ID Type Source Tests Collected by Time Destination   A : Lumbar 1-2 wound fluid Wound Back, Lower [employer] CULTURE AND STAIN NON BLOOD (Canceled), ANAEROBIC BACTERIAL CULTURE ROUTINE, GRAM STAIN, FUNGAL OR YEAST CULTURE ROUTINE, AEROBIC BACTERIAL CULTURE ROUTINE Lenny Lindsay MD 9/23/2021 7948       Findings: Florid recurrent disk herniation    Lenny Lindsay MD

## 2021-09-24 LAB
ANION GAP SERPL CALCULATED.3IONS-SCNC: 8 MMOL/L (ref 3–14)
BUN SERPL-MCNC: 8 MG/DL (ref 7–30)
CALCIUM SERPL-MCNC: 8.7 MG/DL (ref 8.5–10.1)
CHLORIDE BLD-SCNC: 109 MMOL/L (ref 94–109)
CO2 SERPL-SCNC: 23 MMOL/L (ref 20–32)
CREAT SERPL-MCNC: 0.72 MG/DL (ref 0.66–1.25)
GFR SERPL CREATININE-BSD FRML MDRD: >90 ML/MIN/1.73M2
GLUCOSE BLD-MCNC: 113 MG/DL (ref 70–99)
HGB BLD-MCNC: 12.5 G/DL (ref 13.3–17.7)
POTASSIUM BLD-SCNC: 3.9 MMOL/L (ref 3.4–5.3)
SODIUM SERPL-SCNC: 140 MMOL/L (ref 133–144)

## 2021-09-24 PROCEDURE — 258N000003 HC RX IP 258 OP 636: Performed by: INTERNAL MEDICINE

## 2021-09-24 PROCEDURE — 120N000001 HC R&B MED SURG/OB

## 2021-09-24 PROCEDURE — 250N000011 HC RX IP 250 OP 636: Performed by: PHYSICIAN ASSISTANT

## 2021-09-24 PROCEDURE — 250N000011 HC RX IP 250 OP 636: Performed by: INTERNAL MEDICINE

## 2021-09-24 PROCEDURE — 250N000009 HC RX 250: Performed by: INTERNAL MEDICINE

## 2021-09-24 PROCEDURE — 250N000013 HC RX MED GY IP 250 OP 250 PS 637: Performed by: PHYSICIAN ASSISTANT

## 2021-09-24 PROCEDURE — 250N000013 HC RX MED GY IP 250 OP 250 PS 637: Performed by: INTERNAL MEDICINE

## 2021-09-24 PROCEDURE — 85018 HEMOGLOBIN: CPT | Performed by: PHYSICIAN ASSISTANT

## 2021-09-24 PROCEDURE — 80048 BASIC METABOLIC PNL TOTAL CA: CPT | Performed by: INTERNAL MEDICINE

## 2021-09-24 PROCEDURE — 99233 SBSQ HOSP IP/OBS HIGH 50: CPT | Performed by: INTERNAL MEDICINE

## 2021-09-24 PROCEDURE — 36415 COLL VENOUS BLD VENIPUNCTURE: CPT | Performed by: PHYSICIAN ASSISTANT

## 2021-09-24 RX ORDER — MULTIPLE VITAMINS W/ MINERALS TAB 9MG-400MCG
1 TAB ORAL DAILY
Status: DISCONTINUED | OUTPATIENT
Start: 2021-09-25 | End: 2021-10-08 | Stop reason: HOSPADM

## 2021-09-24 RX ORDER — LANOLIN ALCOHOL/MO/W.PET/CERES
100 CREAM (GRAM) TOPICAL DAILY
Status: DISCONTINUED | OUTPATIENT
Start: 2021-09-25 | End: 2021-09-27

## 2021-09-24 RX ORDER — LORAZEPAM 2 MG/ML
1-2 INJECTION INTRAMUSCULAR EVERY 30 MIN PRN
Status: DISCONTINUED | OUTPATIENT
Start: 2021-09-24 | End: 2021-09-24

## 2021-09-24 RX ORDER — LORAZEPAM 1 MG/1
1-2 TABLET ORAL EVERY 30 MIN PRN
Status: DISCONTINUED | OUTPATIENT
Start: 2021-09-24 | End: 2021-09-24

## 2021-09-24 RX ORDER — FLUMAZENIL 0.1 MG/ML
0.2 INJECTION, SOLUTION INTRAVENOUS
Status: DISCONTINUED | OUTPATIENT
Start: 2021-09-24 | End: 2021-10-01

## 2021-09-24 RX ORDER — PANTOPRAZOLE SODIUM 40 MG/1
40 TABLET, DELAYED RELEASE ORAL EVERY EVENING
Status: DISCONTINUED | OUTPATIENT
Start: 2021-09-24 | End: 2021-10-08 | Stop reason: HOSPADM

## 2021-09-24 RX ORDER — QUETIAPINE FUMARATE 25 MG/1
25 TABLET, FILM COATED ORAL AT BEDTIME
Status: DISCONTINUED | OUTPATIENT
Start: 2021-09-24 | End: 2021-10-02

## 2021-09-24 RX ORDER — FOLIC ACID 1 MG/1
1 TABLET ORAL DAILY
Status: DISCONTINUED | OUTPATIENT
Start: 2021-09-25 | End: 2021-10-08 | Stop reason: HOSPADM

## 2021-09-24 RX ADMIN — Medication 1 TABLET: at 07:00

## 2021-09-24 RX ADMIN — CLONIDINE HYDROCHLORIDE 0.1 MG: 0.1 TABLET ORAL at 08:33

## 2021-09-24 RX ADMIN — Medication 25 MCG: at 20:12

## 2021-09-24 RX ADMIN — LORAZEPAM 2 MG: 2 INJECTION INTRAMUSCULAR; INTRAVENOUS at 16:10

## 2021-09-24 RX ADMIN — CLONIDINE HYDROCHLORIDE 0.1 MG: 0.1 TABLET ORAL at 15:24

## 2021-09-24 RX ADMIN — HYDROXYZINE HYDROCHLORIDE 10 MG: 10 TABLET, FILM COATED ORAL at 15:24

## 2021-09-24 RX ADMIN — ACETAMINOPHEN 975 MG: 325 TABLET, FILM COATED ORAL at 13:31

## 2021-09-24 RX ADMIN — LORAZEPAM 2 MG: 2 INJECTION INTRAMUSCULAR; INTRAVENOUS at 19:07

## 2021-09-24 RX ADMIN — LORAZEPAM 2 MG: 2 INJECTION INTRAMUSCULAR; INTRAVENOUS at 14:37

## 2021-09-24 RX ADMIN — QUETIAPINE FUMARATE 25 MG: 25 TABLET ORAL at 20:12

## 2021-09-24 RX ADMIN — PANTOPRAZOLE SODIUM 40 MG: 40 TABLET, DELAYED RELEASE ORAL at 20:12

## 2021-09-24 RX ADMIN — ACETAMINOPHEN 975 MG: 325 TABLET, FILM COATED ORAL at 06:59

## 2021-09-24 RX ADMIN — ACETAMINOPHEN 975 MG: 325 TABLET, FILM COATED ORAL at 22:38

## 2021-09-24 RX ADMIN — FOLIC ACID: 5 INJECTION, SOLUTION INTRAMUSCULAR; INTRAVENOUS; SUBCUTANEOUS at 14:37

## 2021-09-24 RX ADMIN — LORAZEPAM 1 MG: 2 INJECTION INTRAMUSCULAR; INTRAVENOUS at 08:33

## 2021-09-24 RX ADMIN — LORAZEPAM 2 MG: 2 INJECTION INTRAMUSCULAR; INTRAVENOUS at 18:01

## 2021-09-24 RX ADMIN — SENNOSIDES AND DOCUSATE SODIUM 1 TABLET: 50; 8.6 TABLET ORAL at 20:12

## 2021-09-24 RX ADMIN — LORAZEPAM 2 MG: 2 INJECTION INTRAMUSCULAR; INTRAVENOUS at 03:53

## 2021-09-24 RX ADMIN — LORAZEPAM 2 MG: 2 INJECTION INTRAMUSCULAR; INTRAVENOUS at 00:19

## 2021-09-24 RX ADMIN — LORAZEPAM 2 MG: 2 INJECTION INTRAMUSCULAR; INTRAVENOUS at 13:31

## 2021-09-24 RX ADMIN — HYDRALAZINE HYDROCHLORIDE 10 MG: 20 INJECTION INTRAMUSCULAR; INTRAVENOUS at 18:01

## 2021-09-24 RX ADMIN — LORAZEPAM 1 MG: 1 TABLET ORAL at 12:26

## 2021-09-24 RX ADMIN — LORAZEPAM 2 MG: 2 INJECTION INTRAMUSCULAR; INTRAVENOUS at 07:05

## 2021-09-24 RX ADMIN — LORAZEPAM 2 MG: 2 INJECTION INTRAMUSCULAR; INTRAVENOUS at 01:57

## 2021-09-24 RX ADMIN — HALOPERIDOL LACTATE 5 MG: 5 INJECTION, SOLUTION INTRAMUSCULAR at 00:00

## 2021-09-24 RX ADMIN — CEFAZOLIN SODIUM 1 G: 1 INJECTION, SOLUTION INTRAVENOUS at 00:58

## 2021-09-24 RX ADMIN — HALOPERIDOL LACTATE 2.5 MG: 5 INJECTION, SOLUTION INTRAMUSCULAR at 15:32

## 2021-09-24 ASSESSMENT — ACTIVITIES OF DAILY LIVING (ADL)
ADLS_ACUITY_SCORE: 8
ADLS_ACUITY_SCORE: 14
ADLS_ACUITY_SCORE: 8
ADLS_ACUITY_SCORE: 18
ADLS_ACUITY_SCORE: 18
ADLS_ACUITY_SCORE: 12

## 2021-09-24 NOTE — PROGRESS NOTES
Code 21 Response    Responded to code.  Chart reviewed.  Patient is s/p spine surgery on 9/23.  He has history of alcohol use disorder.  He is currently quite agitated and confused.  He thinks that he is going golfing, not aware of what is happening.  He had recently received Ativan per CIWA scale as well as Haldol 5 minutes before code called.      He has been placed in 4 point restraints.  He is currently more calm and falling asleep at times.  Will continue with restraints for now and continue CIWA scale and medications.  He is fine to stay on the current unit.  If he worsens may need to consider transfer to ICU for precedex but not needed at this time.    Suzanne Lopez MD

## 2021-09-24 NOTE — PLAN OF CARE
Assumed care around 1600. Patient originally lethargic and cooperative. Patient since became very anxious and aggressive with staff, not redirectable around 1715. Patient was impulsive and trying to jump out of bed, and was trying to pull at all lines. Charge updated about behavior. Patient mentation unchanged most of shift. CIWA taken and patient given PRN ativan, appeared to help as patient calmed down at end of shift change. Patient pain managed with PRN oxycodone 5 mg, Patient since has been unable to use number scale for pain, but able to state if in pain or not. Reed patent. PIV remains intact with fluids running. Patient satting in mid 90s on RA, capno on. BP remains elevated. Dressing CDI at start of shift, but with increased movement, drain dressing showing some small drainage. CMS intact. Up with assist of 2 with gait belt and fww. Wife present at start of shift. Will continue to monitor and follow plan of care.     Rosemary Jones RN on 9/23/2021

## 2021-09-24 NOTE — PROGRESS NOTES
"Ortho Rounding Note    S: Pt in bed, confused and agitated overnight. This AM he is no longer in restraints. He is able to respond minimally to verbal questioning regarding pain or LE pain. He is able to communicate that his bilateral leg pain is improved from surgery. At this time additional questions are difficult to ascertain.      O:  Vital signs:   Blood pressure (!) 158/88, pulse 62, temperature 98.3  F (36.8  C), temperature source Temporal, resp. rate 20, height 1.778 m (5' 10\"), weight 83.5 kg (184 lb), SpO2 94 %.  Estimated body mass index is 26.4 kg/m  as calculated from the following:    Height as of this encounter: 1.778 m (5' 10\").    Weight as of this encounter: 83.5 kg (184 lb).      Intake/Output Summary (Last 24 hours) at 9/24/2021 1141  Last data filed at 9/24/2021 0951  Gross per 24 hour   Intake 1040 ml   Output 1130 ml   Net -90 ml       A&Ox0   Drain intact  Dressings c/d/i aside from blood drainage at the drain site  Patient is able to move his bilateral LE without appreciable difficulty. Remainder of exam difficult to accurately perform due to confusion     A:  POD #1 s/p L1-2 PSF     P:  General: Patient in bed and has had significant confusion which is inhibiting post op recovery. He is able to communicate that his pain is improved from preop. At this time, as mentation improves, continue with planned PT/OT and daily cares.   Pain: PO  Act: up ad sowmya, with therapy  DVT: Mech only  ID: routine postop abx to be completed 24 hours after surgery  Dispo: Discharge at this time remains with TCU vs home. Patient in withdrawal and requiring ativan at this time.  Progress will depend on improvement with confusion.      Appreciate Medicine consult for medical management    Wilder Guy PA-C    "

## 2021-09-24 NOTE — CONSULTS
Pt admitted with L1-2 posterior lumbar fusion, now actively having etoh withdrawal . SW consult acknowledge for discharge planning.     SW will assess for discharge needs when medically appropriate.      Susanne Guy RN, BSN CTS  Care Coordinator  [facility]   [phone]

## 2021-09-24 NOTE — PLAN OF CARE
VSS. RPIV: infusing 100ml/hr NS. Pt was very confused, very agitated, not redirectable, and began to be combative with staff. 2mg IV Ativan was given with no relief. 5mg IV Haldol given and pt continued these behaviors. There were 4 staff members trying to reorient pt and keep him from moving in ways that would harm his back. A CODE 21 was called dt continuing aggressive and combative behaviors. Pt was placed in 5pt restraints and we obtained a sitter. Pt received a total of 10mg of IV Ativan. Restraints were removed at 0500. Pt started to become more agitated at 0700, Ativan was given and pt had an incont change. Frequent monitoring, will cont plan of care.

## 2021-09-24 NOTE — PROGRESS NOTES
[facility]    Hospitalist Progress Note  Name: Armen Phelps    MRN: [id number]  Provider: Agnes Ortez MD  Date of Service: 09/24/2021    Assessment & Plan   Summary of Stay: Armen Phelps is a 76 year old male who was admitted on 9/23/2021. past medical history significant for hypertension, hyperlipidemia, GERD, alcohol dependence admitted for elective L1-2 posterior lumbar fusion with revision decompression and discectomy.  MedicineTeam consulted for comanagement.    Patient is a daily alcohol user.  He was on CIWA protocol.  Went into withdrawal last night.  Required 10 mg of IV Ativan and Haldol.  He was also placed on restraints.    Acute alcohol withdrawal  Acute metabolic encephalopathy secondary to alcohol withdrawal  -Continue CIWA protocol  -Unable to tolerate p.o. we will switch to IV vitamins, thiamine and folate  -As needed lorazepam     Status post L1-L2 fusion revision decompression and discectomy  -Postop day #1  -Postop pain management anticoagulation physical therapy per primary team  -PT deferred due to patient's mental status     Hypertension  -Prior to admission patient was on lisinopril but was discontinued due to renal insufficiency  -As needed hydralazine available     Hyperlipidemia  -Continue statins     GERD  -Continue PPI       DVT Prophylaxis: Defer to primary service  Code Status: Full Code    Disposition: Expected discharge i to be decided pending withdrawal      Interval History   Reviewed chart.  Patient did however go into acute alcohol withdrawal last night requiring restraints and high doses of lorazepam.  This morning is somnolent and sleepy discussed with patient's wife was at bedside.  He is more awake and was able to tolerate minimal amount of breakfast this morning.  Unable to get 10 point review of systems due to patient's mentation.  Per report did require significant amount of Ativan however is much better this morning.    -Data reviewed today: I reviewed  all new labs and imaging reports over the last 24 hours. I personally reviewed no images or EKG's today.    Physical Exam   Temp: 98.3  F (36.8  C) Temp src: Temporal BP: (!) 158/88 Pulse: 83   Resp: 12 SpO2: 94 % O2 Device: None (Room air) Oxygen Delivery: 1 LPM  Vitals:    09/23/21 0559   Weight: 83.5 kg (184 lb)     Vital Signs with Ranges  Temp:  [97.1  F (36.2  C)-99.2  F (37.3  C)] 98.3  F (36.8  C)  Pulse:  [] 83  Resp:  [5-16] 12  BP: (102-189)/() 158/88  SpO2:  [92 %-100 %] 94 %  I/O last 3 completed shifts:  In: 2200 [I.V.:2200]  Out: 635 [Urine:365; Drains:170; Blood:100]      GEN: Could not assess orientation, awake, responding and called, following some commands  HEENT:  Normocephalic/atraumatic, no scleral icterus, no nasal discharge, mouth dry  CV:  Regular rate and rhythm, no murmur or JVD.  S1 + S2 noted, no S3 or S4.  LUNGS:  Clear to auscultation bilaterally without rales/rhonchi/wheezing/retractions.  Symmetric chest rise on inhalation noted.  ABD:  Active bowel sounds, soft, non-tender/non-distended.  No rebound/guarding/rigidity.  EXT: trace edema.   SKIN:  Dry to touch, no exanthems noted in the visualized areas.    Medications     sodium chloride 100 mL/hr at 09/24/21 0840       acetaminophen  975 mg Oral Q8H     calcium carbonate-vitamin D  1 tablet Oral TID AC     cloNIDine  0.1 mg Oral Q8H     folic acid  1 mg Oral Daily     multivitamin w/minerals  1 tablet Oral Daily     polyethylene glycol  17 g Oral Daily     senna-docusate  1 tablet Oral BID     sodium chloride (PF)  3 mL Intracatheter Q8H     thiamine  100 mg Oral Daily     cholecalciferol  25 mcg Oral BID     Data     No results for input(s): PH, PHV, PO2, PO2V, SAT, PCO2, PCO2V, HCO3, HCO3V in the last 168 hours.  Recent Labs   Lab 09/24/21  0732   HGB 12.5*          Lab Results   Component Value Date     09/24/2021    .0 07/15/2021    .8 04/26/2021    .6 04/12/2021    Lab Results   Component  Value Date    CHLORIDE 109 09/24/2021    CHLORIDE 104.6 07/15/2021    CHLORIDE 92.0 04/26/2021    CHLORIDE 92.9 04/12/2021    Lab Results   Component Value Date    BUN 8 09/24/2021    BUN 6 07/15/2021    BUN 6.3 07/15/2021    BUN 13 04/26/2021    BUN 8.1 04/26/2021    BUN 11 04/12/2021    BUN 8.0 04/12/2021      Lab Results   Component Value Date    POTASSIUM 3.9 09/24/2021    POTASSIUM 3.68 07/15/2021    POTASSIUM 3.55 04/26/2021    POTASSIUM 3.73 04/12/2021    Lab Results   Component Value Date    CO2 23 09/24/2021    CO2 30.4 07/15/2021    CO2 28.2 04/26/2021    CO2 28.6 04/12/2021    Lab Results   Component Value Date    CR 0.72 09/24/2021    CR 0.96 07/15/2021    CR 1.61 04/26/2021    CR 1.37 04/12/2021        No results for input(s): CULT in the last 168 hours.  No results for input(s): NTBNPI, NTBNP in the last 168 hours.  Recent Labs   Lab 09/24/21  0732   *     Recent Labs   Lab 09/24/21  0732   HGB 12.5*     No results for input(s): AST, ALT, GGT, ALKPHOS, BILITOTAL, BILICONJ, BILIDIRECT, REVA in the last 168 hours.    Invalid input(s): BILIRUBININDIRECT  No results for input(s): INR in the last 168 hours.  No results for input(s): LACT in the last 168 hours.  No results for input(s): LIPASE in the last 168 hours.  No results for input(s): CHOL, HDL, LDL, TRIG, CHOLHDLRATIO in the last 168 hours.  Recent Labs   Lab 09/24/21  0732   BUN 8   CR 0.72     No results for input(s): TSH in the last 168 hours.  No results for input(s): TROPONIN, TROPI, TROPR in the last 168 hours.    Invalid input(s): TROP, TROPONINIES  No results for input(s): COLOR, APPEARANCE, URINEGLC, URINEBILI, URINEKETONE, SG, UBLD, URINEPH, PROTEIN, UROBILINOGEN, NITRITE, LEUKEST, RBCU, WBCU in the last 168 hours.    Recent Results (from the past 24 hour(s))   XR Surgery ROBYN L/T 5 Min Fluoro w Stills    Narrative    This exam was marked as non-reportable because it will not be read by a   radiologist or a [place] non-radiologist  provider.

## 2021-09-24 NOTE — PLAN OF CARE
Patient vital signs are at baseline: No,  Reason:  Hypertensive  Patient able to ambulate as they were prior to admission or with assist devices provided by therapies during their stay:  No,  Reason:  A2 walker gait belt needs to be fitted for brace with work with PT/OT  Patient MUST void prior to discharge:  No,  Reason:  Reed catheter discontinued Due to Void  Patient able to tolerate oral intake:  Yes  Pain has adequate pain control using Oral analgesics: Yes Scheduled tylenol and Atarax given for pain control    On CIWA scale scored a 4.  Hard to redirect Disoriented to situation. Reed catheter removed due to void.

## 2021-09-24 NOTE — PLAN OF CARE
Variances- ETOH w/d. Confusion. Oriented to self only. CIWA scores completed. Highest 16. Provided Ativan 7mg total since 1330 and haldol 2.5mg. Wife and sitter 1:1 at the bedside. Last drink Tuesday evening per wife.     Agitation. Needing constant cues and reminders for safety. Food poor- easy foods able to take in safely. Crushed meds. Lungs clear. Dressing clean and dry except to hemovac dressing with drainage. Hemovac drains well. Edema noted by incision between incision and drain about 1/2 dollar in size- did note to Donte ALEXANDER. Able to move all extremities. Able to feel my touch to legs. Unable to dorsi and planter flexions now as unable to follow directions. Voiding- continent and incontinent. Talking rambling thoughts. Safety. Pain- tylenol and atarax provided. BP elevated this afternoon and tachycardia. Will monitor close. Room air.1800 hydralazine for elevated BP and ativan 2mg provided now. CIWA 11.

## 2021-09-25 ENCOUNTER — APPOINTMENT (OUTPATIENT)
Dept: PHYSICAL THERAPY | Facility: CLINIC | Age: 76
DRG: 459 | End: 2021-09-25
Attending: ORTHOPAEDIC SURGERY
Payer: COMMERCIAL

## 2021-09-25 LAB
ANION GAP SERPL CALCULATED.3IONS-SCNC: 4 MMOL/L (ref 3–14)
BUN SERPL-MCNC: 6 MG/DL (ref 7–30)
CALCIUM SERPL-MCNC: 8.3 MG/DL (ref 8.5–10.1)
CHLORIDE BLD-SCNC: 111 MMOL/L (ref 94–109)
CO2 SERPL-SCNC: 26 MMOL/L (ref 20–32)
CREAT SERPL-MCNC: 0.7 MG/DL (ref 0.66–1.25)
GFR SERPL CREATININE-BSD FRML MDRD: >90 ML/MIN/1.73M2
GLUCOSE BLD-MCNC: 96 MG/DL (ref 70–99)
HGB BLD-MCNC: 10.8 G/DL (ref 13.3–17.7)
POTASSIUM BLD-SCNC: 3.3 MMOL/L (ref 3.4–5.3)
POTASSIUM BLD-SCNC: 3.8 MMOL/L (ref 3.4–5.3)
SODIUM SERPL-SCNC: 141 MMOL/L (ref 133–144)

## 2021-09-25 PROCEDURE — 97116 GAIT TRAINING THERAPY: CPT | Mod: GP | Performed by: PHYSICAL THERAPIST

## 2021-09-25 PROCEDURE — 120N000001 HC R&B MED SURG/OB

## 2021-09-25 PROCEDURE — 250N000013 HC RX MED GY IP 250 OP 250 PS 637: Performed by: PHYSICIAN ASSISTANT

## 2021-09-25 PROCEDURE — 84132 ASSAY OF SERUM POTASSIUM: CPT | Performed by: HOSPITALIST

## 2021-09-25 PROCEDURE — 250N000011 HC RX IP 250 OP 636: Performed by: INTERNAL MEDICINE

## 2021-09-25 PROCEDURE — 258N000003 HC RX IP 258 OP 636: Performed by: PHYSICIAN ASSISTANT

## 2021-09-25 PROCEDURE — 36415 COLL VENOUS BLD VENIPUNCTURE: CPT | Performed by: HOSPITALIST

## 2021-09-25 PROCEDURE — 97162 PT EVAL MOD COMPLEX 30 MIN: CPT | Mod: GP | Performed by: PHYSICAL THERAPIST

## 2021-09-25 PROCEDURE — 250N000013 HC RX MED GY IP 250 OP 250 PS 637: Performed by: INTERNAL MEDICINE

## 2021-09-25 PROCEDURE — 99233 SBSQ HOSP IP/OBS HIGH 50: CPT | Performed by: HOSPITALIST

## 2021-09-25 PROCEDURE — 85018 HEMOGLOBIN: CPT | Performed by: PHYSICIAN ASSISTANT

## 2021-09-25 PROCEDURE — 36415 COLL VENOUS BLD VENIPUNCTURE: CPT | Performed by: INTERNAL MEDICINE

## 2021-09-25 PROCEDURE — 80048 BASIC METABOLIC PNL TOTAL CA: CPT | Performed by: INTERNAL MEDICINE

## 2021-09-25 PROCEDURE — 97530 THERAPEUTIC ACTIVITIES: CPT | Mod: GP | Performed by: PHYSICAL THERAPIST

## 2021-09-25 PROCEDURE — 250N000013 HC RX MED GY IP 250 OP 250 PS 637: Performed by: HOSPITALIST

## 2021-09-25 RX ORDER — POTASSIUM CHLORIDE 1.5 G/1.58G
40 POWDER, FOR SOLUTION ORAL ONCE
Status: COMPLETED | OUTPATIENT
Start: 2021-09-25 | End: 2021-09-25

## 2021-09-25 RX ADMIN — CLONIDINE HYDROCHLORIDE 0.1 MG: 0.1 TABLET ORAL at 09:07

## 2021-09-25 RX ADMIN — ACETAMINOPHEN 975 MG: 325 TABLET, FILM COATED ORAL at 22:24

## 2021-09-25 RX ADMIN — SODIUM CHLORIDE: 9 INJECTION, SOLUTION INTRAVENOUS at 01:26

## 2021-09-25 RX ADMIN — MULTIPLE VITAMINS W/ MINERALS TAB 1 TABLET: TAB at 09:07

## 2021-09-25 RX ADMIN — THIAMINE HCL TAB 100 MG 100 MG: 100 TAB at 09:07

## 2021-09-25 RX ADMIN — LORAZEPAM 2 MG: 2 INJECTION INTRAMUSCULAR; INTRAVENOUS at 14:04

## 2021-09-25 RX ADMIN — LORAZEPAM 2 MG: 2 INJECTION INTRAMUSCULAR; INTRAVENOUS at 10:58

## 2021-09-25 RX ADMIN — ACETAMINOPHEN 975 MG: 325 TABLET, FILM COATED ORAL at 06:40

## 2021-09-25 RX ADMIN — LORAZEPAM 2 MG: 2 INJECTION INTRAMUSCULAR; INTRAVENOUS at 20:10

## 2021-09-25 RX ADMIN — POTASSIUM CHLORIDE 40 MEQ: 1.5 POWDER, FOR SOLUTION ORAL at 09:07

## 2021-09-25 RX ADMIN — Medication 1 TABLET: at 12:07

## 2021-09-25 RX ADMIN — HYDROXYZINE HYDROCHLORIDE 10 MG: 10 TABLET, FILM COATED ORAL at 12:07

## 2021-09-25 RX ADMIN — SENNOSIDES AND DOCUSATE SODIUM 1 TABLET: 50; 8.6 TABLET ORAL at 09:07

## 2021-09-25 RX ADMIN — LORAZEPAM 2 MG: 2 INJECTION INTRAMUSCULAR; INTRAVENOUS at 18:30

## 2021-09-25 RX ADMIN — SENNOSIDES AND DOCUSATE SODIUM 1 TABLET: 50; 8.6 TABLET ORAL at 20:11

## 2021-09-25 RX ADMIN — Medication 25 MCG: at 20:11

## 2021-09-25 RX ADMIN — Medication 25 MCG: at 09:07

## 2021-09-25 RX ADMIN — LORAZEPAM 2 MG: 2 INJECTION INTRAMUSCULAR; INTRAVENOUS at 19:01

## 2021-09-25 RX ADMIN — FOLIC ACID 1 MG: 1 TABLET ORAL at 09:07

## 2021-09-25 RX ADMIN — OXYCODONE HYDROCHLORIDE 5 MG: 5 TABLET ORAL at 12:07

## 2021-09-25 RX ADMIN — ACETAMINOPHEN 975 MG: 325 TABLET, FILM COATED ORAL at 13:56

## 2021-09-25 RX ADMIN — CLONIDINE HYDROCHLORIDE 0.1 MG: 0.1 TABLET ORAL at 16:40

## 2021-09-25 RX ADMIN — QUETIAPINE FUMARATE 25 MG: 25 TABLET ORAL at 22:24

## 2021-09-25 RX ADMIN — PANTOPRAZOLE SODIUM 40 MG: 40 TABLET, DELAYED RELEASE ORAL at 20:11

## 2021-09-25 RX ADMIN — Medication 1 TABLET: at 09:07

## 2021-09-25 RX ADMIN — CLONIDINE HYDROCHLORIDE 0.1 MG: 0.1 TABLET ORAL at 00:24

## 2021-09-25 RX ADMIN — CLONIDINE HYDROCHLORIDE 0.1 MG: 0.1 TABLET ORAL at 23:42

## 2021-09-25 ASSESSMENT — ACTIVITIES OF DAILY LIVING (ADL)
ADLS_ACUITY_SCORE: 24
ADLS_ACUITY_SCORE: 20
ADLS_ACUITY_SCORE: 20
ADLS_ACUITY_SCORE: 24
ADLS_ACUITY_SCORE: 24
ADLS_ACUITY_SCORE: 18

## 2021-09-25 NOTE — PLAN OF CARE
Pt up with Ax2 with walker and gait belt. Pt not able to verbalize needs. Has 1:1 at bedside. Incontinent throughout the night. Not able to follow directions consistently. Can get agitated and anxious. On CIWA protocol. Received Seroquel at bedtime and slept all night. Hemovac removed this AM. New dressing applied to incision site due to it falling off. Wife would like pt to go to TCU when discharged. BP has been elevated. Will continue to monitor.

## 2021-09-25 NOTE — PROGRESS NOTES
"Orthopedic Surgery  9/25/2021  POD 2    S: Confused and using urinal with the assist of 2 nurses in his bed.     O: Blood pressure (!) 143/87, pulse 83, temperature 98.1  F (36.7  C), temperature source Temporal, resp. rate 14, height 1.778 m (5' 10\"), weight 83.5 kg (184 lb), SpO2 95 %.  Lab Results   Component Value Date    HGB 12.5 09/24/2021    HGB 13.6 07/15/2021     No results found for: INR  I/O last 3 completed shifts:  In: 1289 [P.O.:600; I.V.:689]  Out: 1120 [Urine:950; Drains:170]  Distal extremity CMSI bilaterally.  Calves are negative bilaterally, both soft and nontender.  The dressing is C/D/I.      A: Mr. Phelps is post Procedure(s):  L1 to L2 posterior fusion. Posterior instrumentation using a bilateral pedicle screw and pily construct spanning from L1 to L2 for fusion purposes. Revision decompression L1. Revision decompression L2. L1 to L2 diskectomy, following a left-sided partial facetectomy.     P: Orthopedically doing well. Plan to mobilize today with PT. Likely will need TCU placement due to multiple other factors. On CIWA protocol. Continue PT, Keep Dressing CDI. Will defer discharge until bed available and medically cleared likely tomorrow or Monday. Will need social work input. Wife states she will be back at noon today to hopefully discuss options with Social work.  Dary Guillen PA-C  [phone]  "

## 2021-09-25 NOTE — PROGRESS NOTES
[facility]    Hospitalist Progress Note      Assessment & Plan   Armen Phelps is a 76 year old male who was admitted on 9/23/2021. past medical history significant for hypertension, hyperlipidemia, GERD, alcohol dependence admitted for elective L1-2 posterior lumbar fusion with revision decompression and discectomy.  MedicineTeam consulted for comanagement.    Mr. Phelps is a daily alcohol user.  He went into severe withdrawal in the evening of 9/23-9/24.  He is on CIWA protocol.    #Alcohol use d/o with withdrawal: Patient went into severe withdrawal on the evening of 9/23-9/24.  He is improved today, 9/25.  He has been placed on CIWA protocol with vitamin therapy.  -Continue to monitor for withdrawal.  Ativan available as needed. Clonidine low dose with hold parameters.      #Status post L1-L2 fusion revision decompression and discectomy: Underwent surgery on 9/23.  Tolerated well.  -Postop pain management anticoagulation physical therapy per primary team  -Physical therapy consulted.  Patient will likely need TCU I suspect.     #Hypertension: Prior to admission patient was on lisinopril but was discontinued due to renal insufficiency  -As needed hydralazine available     #Hyperlipidemia: Continue statin    #Hypokalemia: Replacement protocol     #GERD  -Continue PPI    DVT Prophylaxis: Defer to primary service  Code Status: Full Code  Dispo: Likely in 1 to 2 days.  PT recommending TCU.    Sung Diez MD  Text Page    Interval History   Assumed care today.  Patient with improvement in withdrawal symptoms.  Denies any worsening pain.  No chest pain or shortness of breath.  Up in the chair today.  More calm and cooperative.    -Data reviewed today: I reviewed all new labs and imaging results over the last 24 hours.     Physical Exam   Temp: 98.1  F (36.7  C) Temp src: Temporal BP: (!) 143/87 Pulse: 83   Resp: 16 SpO2: 95 % O2 Device: None (Room air)    Vitals:    09/23/21 0559   Weight: 83.5  kg (184 lb)     Vital Signs with Ranges  Temp:  [98.1  F (36.7  C)-99.5  F (37.5  C)] 98.1  F (36.7  C)  Pulse:  [] 83  Resp:  [14-20] 16  BP: (143-180)/() 143/87  SpO2:  [94 %-97 %] 95 %  I/O last 3 completed shifts:  In: 1289 [P.O.:600; I.V.:689]  Out: 1120 [Urine:950; Drains:170]    Constitutional: Nontoxic, NAD. Sitting up in chair. A&Ox3. Wants to go home.   HEENT: Normocephalic. MMM, No elevation of JVD noted.   Respiratory: Nl WOB, Clear bilaterally, No wheezes or crackles  Cardiovascular: Regular, no murmur  GI: BS+, NT, ND  Skin/Integumen: WWP, no rash. No edema  Neuro: CNII-XII intact. Moves all extremities. Mild tremor of UE. A&Ox3.    Medications     sodium chloride Stopped (09/25/21 0909)       acetaminophen  975 mg Oral Q8H     calcium carbonate-vitamin D  1 tablet Oral TID AC     cloNIDine  0.1 mg Oral Q8H     folic acid  1 mg Oral Daily     multivitamin w/minerals  1 tablet Oral Daily     pantoprazole  40 mg Oral QPM     polyethylene glycol  17 g Oral Daily     QUEtiapine  25 mg Oral At Bedtime     senna-docusate  1 tablet Oral BID     sodium chloride (PF)  3 mL Intracatheter Q8H     thiamine  100 mg Oral Daily     cholecalciferol  25 mcg Oral BID       Data   Recent Labs   Lab 09/25/21  0740 09/24/21  0732   HGB 10.8* 12.5*    140   POTASSIUM 3.3* 3.9   CHLORIDE 111* 109   CO2 26 23   BUN 6* 8   CR 0.70 0.72   ANIONGAP 4 8   DESTINEE 8.3* 8.7   GLC 96 113*       No results found for this or any previous visit (from the past 24 hour(s)).

## 2021-09-25 NOTE — PLAN OF CARE
Variances- CIWA today- requiring 4 mg ativan so far this shift. Painful- oxycodone provided seemed to make more agitated and angry pulling at wives arm. Confusion to place, time, situation at this time. Safety and cues needed constantly throughout the shift.  Was able to sit in the chair with 2 assist this AM, walker. GB. Unable to follow rules consistently. BP elevated. Tachycardia this afternoon. BP med provided. Fidgety and squirming in bed most of the day needing 1:1 redirection almost constantly.   Dressing dry. Able to move all extremities. All ADLs provided per staff today except he fed himself breakfast. Wife at bedside. Plan at this time would be needing TCU at discharge.   1905 very agitated this evening. Ativan provided x2 recently. Trying to get out of bed and too unsafe. Sitter at bedside and all cares provided.

## 2021-09-25 NOTE — PROGRESS NOTES
09/25/21 0916   Quick Adds   Type of Visit Initial PT Evaluation   Living Environment   People in home spouse   Current Living Arrangements house   Home Accessibility stairs to enter home;stairs within home   Living Environment Comments patient having difficulty answering questions; reports he has stairs; spouse not present   Self-Care   Usual Activity Tolerance moderate   Current Activity Tolerance poor   Equipment Currently Used at Home walker, rolling  (per patient)   Activity/Exercise/Self-Care Comment unsure of PLOF; patient reports he was using a walker but also riding a bike?   Disability/Function   Mobility Management needing to go slow secondary to pain (per chart)   Fall history within last six months yes   Number of times patient has fallen within last six months   (unsure; fell after last surgery per chart/nurse)   Change in Functional Status Since Onset of Current Illness/Injury yes   General Information   Onset of Illness/Injury or Date of Surgery 09/23/21   Referring Physician Wilder Guy PA-C   Patient/Family Therapy Goals Statement (PT) not stated by patient; spouse wants patient to go to TCU   Pertinent History of Current Problem (include personal factors and/or comorbidities that impact the POC) per chart: 6-year old male Mr. Armen Phelps with past medical history significant for hypertension, hyperlipidemia, GERD, alcohol dependence admitted for elective L1-2 posterior lumbar fusion with revision decompression and discectomy; on CIWA protocol; had agitation yesterday with need for restraint but improved this am   Existing Precautions/Restrictions fall;spinal   General Observations patient in bed; lethargic; slow to follow commands   Cognition   Orientation Status (Cognition) person;place;other (see comments)  (knew year but not month)   Affect/Mental Status (Cognition) flat/blunted affect;confused   Follows Commands (Cognition) 25-49% accuracy   Safety Deficit (Cognition) awareness  of need for assistance;safety precautions awareness   Memory Deficit (Cognition) other (see comments)  (impaired currently)   Cognitive Status Comments lethargic; slow to follow commands; impaired   Pain Assessment   Patient Currently in Pain Yes, see Vital Sign flowsheet  (doesn't rate; noted grimacing with movement)   Integumentary/Edema   Integumentary/Edema Comments posterior spinal incision   Posture    Posture Comments forward flexed at trunk in standing   Range of Motion (ROM)   ROM Comment trunk limited by spinal precautions; others appear WFL   Strength   Strength Comments significant functional weakness noted with mobility but able to take steps to chair   Bed Mobility   Comment (Bed Mobility) mod A for rolling to L; max A to come to sitting   Transfers   Transfer Safety Comments max A to come to stand; use of walker   Gait/Stairs (Locomotion)   Comment (Gait/Stairs) able to take steps to bedside chair with use of walker and max A x 1 and CGA of another for safety; difficulty advancing feet   Balance   Balance Comments impaired; losing balance backward   Sensory Examination   Sensory Perception Comments unsure as patient having difficulty stating   Clinical Impression   Criteria for Skilled Therapeutic Intervention yes, treatment indicated   PT Diagnosis (PT) impaired gait/transfers   Influenced by the following impairments functional weakness; pain; impaired cognition; decreased command following; dizziness; decreased activity tolerance; spinal precautions   Functional limitations due to impairments impaired independence with functional mobility and cares secondary to above deficits   Clinical Presentation Evolving/Changing   Clinical Presentation Rationale clinical judgement; level off assist   Clinical Decision Making (Complexity) moderate complexity   Therapy Frequency (PT) Daily  (will increase if appropriate)   Predicted Duration of Therapy Intervention (days/wks) 5 days   Planned Therapy  Interventions (PT) bed mobility training;gait training;strengthening;transfer training;progressive activity/exercise   Anticipated Equipment Needs at Discharge (PT) walker, rolling;wheelchair   Risk & Benefits of therapy have been explained evaluation/treatment results reviewed;care plan/treatment goals reviewed;current/potential barriers reviewed;risks/benefits reviewed;participants included;patient   Clinical Impression Comments significantly below baseline   PT Discharge Planning    PT Discharge Recommendation (DC Rec) Transitional Care Facility   PT Rationale for DC Rec Patient significantly below baseline and would benefit from ongoing skilled PT at TCU to maximize return to PLOF; doesn't appear safe to return home in current condition and is a high falls risk; spouse feels unable to care for him at home in current condition per staff; if patient would return home would need 24/7 assist x 2, walker, and wheelchair to return home and would need Home PT; would need transport into home secondary to inability to do stair   PT Brief overview of current status  max A for bed mobility; mod/max A x 2 to transfer to wheelchair; impaired cognition   Total Evaluation Time   Total Evaluation Time (Minutes) 10

## 2021-09-26 LAB
ALBUMIN SERPL-MCNC: 2.9 G/DL (ref 3.4–5)
ALP SERPL-CCNC: 49 U/L (ref 40–150)
ALT SERPL W P-5'-P-CCNC: 16 U/L (ref 0–70)
ANION GAP SERPL CALCULATED.3IONS-SCNC: 6 MMOL/L (ref 3–14)
AST SERPL W P-5'-P-CCNC: 23 U/L (ref 0–45)
BILIRUB SERPL-MCNC: 1 MG/DL (ref 0.2–1.3)
BUN SERPL-MCNC: 7 MG/DL (ref 7–30)
CALCIUM SERPL-MCNC: 8.7 MG/DL (ref 8.5–10.1)
CHLORIDE BLD-SCNC: 106 MMOL/L (ref 94–109)
CO2 SERPL-SCNC: 28 MMOL/L (ref 20–32)
CREAT SERPL-MCNC: 0.7 MG/DL (ref 0.66–1.25)
ERYTHROCYTE [DISTWIDTH] IN BLOOD BY AUTOMATED COUNT: 15.4 % (ref 10–15)
GFR SERPL CREATININE-BSD FRML MDRD: >90 ML/MIN/1.73M2
GLUCOSE BLD-MCNC: 99 MG/DL (ref 70–99)
HCT VFR BLD AUTO: 36.4 % (ref 40–53)
HGB BLD-MCNC: 11.7 G/DL (ref 13.3–17.7)
MAGNESIUM SERPL-MCNC: 1.7 MG/DL (ref 1.6–2.3)
MCH RBC QN AUTO: 35.2 PG (ref 26.5–33)
MCHC RBC AUTO-ENTMCNC: 32.1 G/DL (ref 31.5–36.5)
MCV RBC AUTO: 110 FL (ref 78–100)
PLATELET # BLD AUTO: 125 10E3/UL (ref 150–450)
POTASSIUM BLD-SCNC: 3.2 MMOL/L (ref 3.4–5.3)
POTASSIUM BLD-SCNC: 3.8 MMOL/L (ref 3.4–5.3)
PROT SERPL-MCNC: 6.2 G/DL (ref 6.8–8.8)
RBC # BLD AUTO: 3.32 10E6/UL (ref 4.4–5.9)
SODIUM SERPL-SCNC: 140 MMOL/L (ref 133–144)
WBC # BLD AUTO: 5.9 10E3/UL (ref 4–11)

## 2021-09-26 PROCEDURE — 36415 COLL VENOUS BLD VENIPUNCTURE: CPT | Performed by: HOSPITALIST

## 2021-09-26 PROCEDURE — 99232 SBSQ HOSP IP/OBS MODERATE 35: CPT | Performed by: HOSPITALIST

## 2021-09-26 PROCEDURE — 83735 ASSAY OF MAGNESIUM: CPT | Performed by: HOSPITALIST

## 2021-09-26 PROCEDURE — 85027 COMPLETE CBC AUTOMATED: CPT | Performed by: HOSPITALIST

## 2021-09-26 PROCEDURE — 250N000013 HC RX MED GY IP 250 OP 250 PS 637: Performed by: PHYSICIAN ASSISTANT

## 2021-09-26 PROCEDURE — 80053 COMPREHEN METABOLIC PANEL: CPT | Performed by: HOSPITALIST

## 2021-09-26 PROCEDURE — 84132 ASSAY OF SERUM POTASSIUM: CPT | Performed by: HOSPITALIST

## 2021-09-26 PROCEDURE — 250N000013 HC RX MED GY IP 250 OP 250 PS 637: Performed by: HOSPITALIST

## 2021-09-26 PROCEDURE — 120N000001 HC R&B MED SURG/OB

## 2021-09-26 PROCEDURE — 250N000013 HC RX MED GY IP 250 OP 250 PS 637: Performed by: INTERNAL MEDICINE

## 2021-09-26 RX ORDER — POTASSIUM CHLORIDE 1.5 G/1.58G
40 POWDER, FOR SOLUTION ORAL ONCE
Status: COMPLETED | OUTPATIENT
Start: 2021-09-26 | End: 2021-09-26

## 2021-09-26 RX ADMIN — SENNOSIDES AND DOCUSATE SODIUM 1 TABLET: 50; 8.6 TABLET ORAL at 20:02

## 2021-09-26 RX ADMIN — THIAMINE HCL TAB 100 MG 100 MG: 100 TAB at 10:19

## 2021-09-26 RX ADMIN — FOLIC ACID 1 MG: 1 TABLET ORAL at 10:19

## 2021-09-26 RX ADMIN — POLYETHYLENE GLYCOL 3350 17 G: 17 POWDER, FOR SOLUTION ORAL at 18:14

## 2021-09-26 RX ADMIN — MULTIPLE VITAMINS W/ MINERALS TAB 1 TABLET: TAB at 10:19

## 2021-09-26 RX ADMIN — SENNOSIDES AND DOCUSATE SODIUM 1 TABLET: 50; 8.6 TABLET ORAL at 10:19

## 2021-09-26 RX ADMIN — CLONIDINE HYDROCHLORIDE 0.1 MG: 0.1 TABLET ORAL at 15:06

## 2021-09-26 RX ADMIN — CLONIDINE HYDROCHLORIDE 0.1 MG: 0.1 TABLET ORAL at 09:58

## 2021-09-26 RX ADMIN — POTASSIUM CHLORIDE 40 MEQ: 1.5 POWDER, FOR SOLUTION ORAL at 09:56

## 2021-09-26 RX ADMIN — Medication 25 MCG: at 10:19

## 2021-09-26 RX ADMIN — ACETAMINOPHEN 975 MG: 325 TABLET, FILM COATED ORAL at 13:08

## 2021-09-26 RX ADMIN — Medication 1 TABLET: at 18:15

## 2021-09-26 RX ADMIN — Medication 1 TABLET: at 10:18

## 2021-09-26 RX ADMIN — ACETAMINOPHEN 975 MG: 325 TABLET, FILM COATED ORAL at 06:04

## 2021-09-26 RX ADMIN — PANTOPRAZOLE SODIUM 40 MG: 40 TABLET, DELAYED RELEASE ORAL at 20:02

## 2021-09-26 RX ADMIN — QUETIAPINE FUMARATE 25 MG: 25 TABLET ORAL at 18:14

## 2021-09-26 RX ADMIN — Medication 25 MCG: at 20:02

## 2021-09-26 ASSESSMENT — ACTIVITIES OF DAILY LIVING (ADL)
ADLS_ACUITY_SCORE: 24
ADLS_ACUITY_SCORE: 22
ADLS_ACUITY_SCORE: 22
ADLS_ACUITY_SCORE: 24
ADLS_ACUITY_SCORE: 22
ADLS_ACUITY_SCORE: 24

## 2021-09-26 NOTE — PROGRESS NOTES
[facility]    Hospitalist Progress Note      Assessment & Plan   Armen Phelps is a 76 year old male who was admitted on 9/23/2021. past medical history significant for hypertension, hyperlipidemia, GERD, alcohol dependence admitted for elective L1-2 posterior lumbar fusion with revision decompression and discectomy.  MedicineTeam consulted for comanagement.     Mr. Phelps is a daily alcohol user.  He went into severe withdrawal in the evening of 9/23-9/24.  Wife is unsure when he had last drink in discussion with her on 09/26.  He is on CIWA protocol.     #Alcohol use d/o with withdrawal: Patient went into severe withdrawal on the evening of 9/23-9/24.  He has been placed on CIWA protocol with vitamin therapy.  He has continued to show signs of alcohol withdrawal but suspect he should have reduced benzo needs over next 24-48 hours as he would be outside window of severe withdrawal assuming his last drink was Wed 09/22.  -Continue to monitor for withdrawal.  Ativan available as needed. Clonidine low dose with hold parameters.    -Discussed importance of abstaining from all alcohol moving forward with wife.       #Status post L1-L2 fusion revision decompression and discectomy: Underwent surgery on 9/23.  Tolerated well.  -Postop pain management anticoagulation physical therapy per primary team  -Physical therapy consulted.  Patient will likely need TCU I suspect.     #Hypertension: Prior to admission patient was on lisinopril but was discontinued due to renal insufficiency  -As needed hydralazine available.  He is also on clonidine with hold parameters for withdrawal.      #Thrombocytopenia: Suspect secondary to alcohol use. Monitor. No evidence of bleeding. Hgb stable.    #Hyperlipidemia: Continue statin     #Hypokalemia: Replacement protocol     #GERD  -Continue PPI     DVT Prophylaxis: Defer to primary service  Code Status: Full Code  Dispo: Likely in 1 to 2 days.  PT recommending  TCU.    Sung Diez MD  Text Page    Interval History   Pt with continues withdrawal symptoms.  Needed bedside sitter overnight as impulsive.  Sleeping this AM.  He is arousable to voice and follows commands.      -Data reviewed today: I reviewed all new labs and imaging results over the last 24 hours.     Physical Exam   Temp: 97.8  F (36.6  C) Temp src: Temporal BP: 127/80 Pulse: 82   Resp: 18 SpO2: 95 % O2 Device: None (Room air)    Vitals:    09/23/21 0559   Weight: 83.5 kg (184 lb)     Vital Signs with Ranges  Temp:  [97.8  F (36.6  C)-99.3  F (37.4  C)] 97.8  F (36.6  C)  Pulse:  [] 82  Resp:  [14-20] 18  BP: (127-175)/() 127/80  SpO2:  [95 %-96 %] 95 %  I/O last 3 completed shifts:  In: 1500 [P.O.:1500]  Out: 875 [Urine:875]     Constitutional: Lying in bed. Sleeping but arousable to voice and stimulation. Wife at bedside.   HEENT: Normocephalic. MMM, No elevation of JVD noted.   Respiratory: Nl WOB, Clear bilaterally, No wheezes or crackles  Cardiovascular: Regular, no murmur  GI: BS+, NT, ND  Skin/Integumen: WWP, no rash. No edema  Neuro: Sleeping. Arousable to voice. Follows commands then goes back to sleep. Moves all extremities.     Medications     sodium chloride Stopped (09/25/21 0909)       acetaminophen  975 mg Oral Q8H     calcium carbonate-vitamin D  1 tablet Oral TID AC     cloNIDine  0.1 mg Oral Q8H     folic acid  1 mg Oral Daily     multivitamin w/minerals  1 tablet Oral Daily     pantoprazole  40 mg Oral QPM     polyethylene glycol  17 g Oral Daily     potassium chloride  40 mEq Oral or Feeding Tube Once     QUEtiapine  25 mg Oral At Bedtime     senna-docusate  1 tablet Oral BID     sodium chloride (PF)  3 mL Intracatheter Q8H     thiamine  100 mg Oral Daily     cholecalciferol  25 mcg Oral BID       Data   Recent Labs   Lab 09/26/21  0613 09/25/21  1340 09/25/21  0740 09/24/21  0732 09/24/21  0732   WBC 5.9  --   --   --   --    HGB 11.7*  --  10.8*  --  12.5*   *  --   --    --   --    *  --   --   --   --      --  141  --  140   POTASSIUM 3.2* 3.8 3.3*   < > 3.9   CHLORIDE 106  --  111*  --  109   CO2 28  --  26  --  23   BUN 7  --  6*  --  8   CR 0.70  --  0.70  --  0.72   ANIONGAP 6  --  4  --  8   DESTINEE 8.7  --  8.3*  --  8.7   GLC 99  --  96  --  113*   ALBUMIN 2.9*  --   --   --   --    PROTTOTAL 6.2*  --   --   --   --    BILITOTAL 1.0  --   --   --   --    ALKPHOS 49  --   --   --   --    ALT 16  --   --   --   --    AST 23  --   --   --   --     < > = values in this interval not displayed.       No results found for this or any previous visit (from the past 24 hour(s)).

## 2021-09-26 NOTE — CONSULTS
Pt still scoring on CIWA.  Please re-consult when appropriate for assessment.      Callie Noble RN BSN   Inpatient Care Coordination  [facility]  [phone]

## 2021-09-26 NOTE — PLAN OF CARE
Patient vital signs are at baseline: No,  Reason:  Elevated Bp's  Patient able to ambulate as they were prior to admission or with assist devices provided by therapies during their stay:  No,  Reason:  Not able to ambulate or follow any directions.  Patient MUST void prior to discharge:  Yes  Patient able to tolerate oral intake:  Yes  Pain has adequate pain control using Oral analgesics:  Yes    VSS; elevated Bp's, scheduled Clonidine given. Agitated and anxious, most of the night, trying to get OOB, not redirectable. Sitter at bedside. CIWA protocol. PRN Ativan and scheduled Seroquel with some relief. scheduled Tylenol for pain. New dressing applied to surgical incision. Bladder incontinence. Plans to discharge to TCU.

## 2021-09-26 NOTE — PLAN OF CARE
"Variances- CIWA today- 6- improvement. Very lethargic this morning- more alert now- unable to follow directions. Confusion. Oriented to self only. Needing all ADLS completed by sitter 1:1 and wife at bedside. High fall risk. Unsafe to be left alone at this time in the room. Painful- tylenol. Safety and cues needed constantly throughout the shift.  Was able to sit in the chair with 2 assist this AM, walker. GB with fletcher steady.  Dressing dry. Able to move all extremities. All ADLs provided per staff today needing cues and reminders for all cares. Plan at this time would be needing TCU at discharge. strong in all extremities. No edema. Not at baseline cognitive. 1815 becoming very agitated and anxious, restless, not oriented, eyes closed, trying to get out of bed constantly. Did give Seroquel now per wife request as each day increased agitation in the evening- ativan has historically not been calming him down in the evening. Wife states \"has sundowning at home and takes the Seroquel anywhere from 4pm to 6pm.\" safety. Did stand at the bedside, unable to take any steps and had to sit back down.sitter remains 1:1, wife here.   "

## 2021-09-26 NOTE — PROGRESS NOTES
"Orthopedic Surgery  9/25/2021  POD 2    S: Confused and using urinal with the assist of 2 nurses in his bed.Was able to respond to commands today better than yesterday.      O: Blood pressure 127/80, pulse 82, temperature 97.8  F (36.6  C), temperature source Temporal, resp. rate 18, height 1.778 m (5' 10\"), weight 83.5 kg (184 lb), SpO2 95 %.  Lab Results   Component Value Date    HGB 12.5 09/24/2021    HGB 13.6 07/15/2021     No results found for: INR  I/O last 3 completed shifts:  In: 1500 [P.O.:1500]  Out: 875 [Urine:875]  Distal extremity CMSI bilaterally.  Calves are negative bilaterally, both soft and nontender.  The dressing is C/D/I.      A: Mr. Phelps is post Procedure(s):  L1 to L2 posterior fusion. Posterior instrumentation using a bilateral pedicle screw and pily construct spanning from L1 to L2 for fusion purposes. Revision decompression L1. Revision decompression L2. L1 to L2 diskectomy, following a left-sided partial facetectomy.     P: Orthopedically doing well but moving slowly Plan to mobilize today with PT. Likely will need TCU placement due to multiple other factors. On CIWA protocol. Continue PT, Keep Dressing CDI. Will defer discharge until bed available and medically cleared likely tomorrow or Monday. Will need social work input. Wife states she will be back at noon today to hopefully discuss options with Social work.  Mat Dover PA-C  [phone]  "

## 2021-09-27 ENCOUNTER — APPOINTMENT (OUTPATIENT)
Dept: PHYSICAL THERAPY | Facility: CLINIC | Age: 76
DRG: 459 | End: 2021-09-27
Attending: ORTHOPAEDIC SURGERY
Payer: COMMERCIAL

## 2021-09-27 LAB
ANION GAP SERPL CALCULATED.3IONS-SCNC: 6 MMOL/L (ref 3–14)
BUN SERPL-MCNC: 10 MG/DL (ref 7–30)
CALCIUM SERPL-MCNC: 8.7 MG/DL (ref 8.5–10.1)
CHLORIDE BLD-SCNC: 107 MMOL/L (ref 94–109)
CO2 SERPL-SCNC: 27 MMOL/L (ref 20–32)
CREAT SERPL-MCNC: 0.79 MG/DL (ref 0.66–1.25)
ERYTHROCYTE [DISTWIDTH] IN BLOOD BY AUTOMATED COUNT: 15.5 % (ref 10–15)
GFR SERPL CREATININE-BSD FRML MDRD: 87 ML/MIN/1.73M2
GLUCOSE BLD-MCNC: 100 MG/DL (ref 70–99)
HCT VFR BLD AUTO: 35.5 % (ref 40–53)
HGB BLD-MCNC: 11.4 G/DL (ref 13.3–17.7)
MAGNESIUM SERPL-MCNC: 2 MG/DL (ref 1.6–2.3)
MCH RBC QN AUTO: 35.3 PG (ref 26.5–33)
MCHC RBC AUTO-ENTMCNC: 32.1 G/DL (ref 31.5–36.5)
MCV RBC AUTO: 110 FL (ref 78–100)
PLATELET # BLD AUTO: 137 10E3/UL (ref 150–450)
POTASSIUM BLD-SCNC: 3.7 MMOL/L (ref 3.4–5.3)
RBC # BLD AUTO: 3.23 10E6/UL (ref 4.4–5.9)
SODIUM SERPL-SCNC: 140 MMOL/L (ref 133–144)
WBC # BLD AUTO: 5.1 10E3/UL (ref 4–11)

## 2021-09-27 PROCEDURE — 250N000011 HC RX IP 250 OP 636: Performed by: INTERNAL MEDICINE

## 2021-09-27 PROCEDURE — 84295 ASSAY OF SERUM SODIUM: CPT | Performed by: HOSPITALIST

## 2021-09-27 PROCEDURE — 99233 SBSQ HOSP IP/OBS HIGH 50: CPT | Performed by: INTERNAL MEDICINE

## 2021-09-27 PROCEDURE — 120N000001 HC R&B MED SURG/OB

## 2021-09-27 PROCEDURE — 36415 COLL VENOUS BLD VENIPUNCTURE: CPT | Performed by: HOSPITALIST

## 2021-09-27 PROCEDURE — 97530 THERAPEUTIC ACTIVITIES: CPT | Mod: GP | Performed by: PHYSICAL THERAPIST

## 2021-09-27 PROCEDURE — 250N000013 HC RX MED GY IP 250 OP 250 PS 637: Performed by: PHYSICIAN ASSISTANT

## 2021-09-27 PROCEDURE — 97116 GAIT TRAINING THERAPY: CPT | Mod: GP | Performed by: PHYSICAL THERAPIST

## 2021-09-27 PROCEDURE — 83735 ASSAY OF MAGNESIUM: CPT | Performed by: HOSPITALIST

## 2021-09-27 PROCEDURE — 85027 COMPLETE CBC AUTOMATED: CPT | Performed by: HOSPITALIST

## 2021-09-27 PROCEDURE — 250N000013 HC RX MED GY IP 250 OP 250 PS 637: Performed by: INTERNAL MEDICINE

## 2021-09-27 PROCEDURE — 258N000003 HC RX IP 258 OP 636: Performed by: INTERNAL MEDICINE

## 2021-09-27 RX ORDER — LANOLIN ALCOHOL/MO/W.PET/CERES
100 CREAM (GRAM) TOPICAL DAILY
Status: DISCONTINUED | OUTPATIENT
Start: 2021-10-04 | End: 2021-10-08 | Stop reason: HOSPADM

## 2021-09-27 RX ORDER — TAMSULOSIN HYDROCHLORIDE 0.4 MG/1
0.4 CAPSULE ORAL DAILY
Status: DISCONTINUED | OUTPATIENT
Start: 2021-09-27 | End: 2021-10-08 | Stop reason: HOSPADM

## 2021-09-27 RX ADMIN — Medication 25 MCG: at 08:35

## 2021-09-27 RX ADMIN — THIAMINE HYDROCHLORIDE 500 MG: 100 INJECTION, SOLUTION INTRAMUSCULAR; INTRAVENOUS at 14:56

## 2021-09-27 RX ADMIN — CLONIDINE HYDROCHLORIDE 0.1 MG: 0.1 TABLET ORAL at 16:40

## 2021-09-27 RX ADMIN — THIAMINE HYDROCHLORIDE 500 MG: 100 INJECTION, SOLUTION INTRAMUSCULAR; INTRAVENOUS at 21:26

## 2021-09-27 RX ADMIN — SENNOSIDES AND DOCUSATE SODIUM 1 TABLET: 50; 8.6 TABLET ORAL at 21:25

## 2021-09-27 RX ADMIN — QUETIAPINE FUMARATE 25 MG: 25 TABLET ORAL at 21:25

## 2021-09-27 RX ADMIN — THIAMINE HYDROCHLORIDE 500 MG: 100 INJECTION, SOLUTION INTRAMUSCULAR; INTRAVENOUS at 10:03

## 2021-09-27 RX ADMIN — ACETAMINOPHEN 650 MG: 325 TABLET, FILM COATED ORAL at 21:33

## 2021-09-27 RX ADMIN — MULTIPLE VITAMINS W/ MINERALS TAB 1 TABLET: TAB at 08:35

## 2021-09-27 RX ADMIN — Medication 1 TABLET: at 08:35

## 2021-09-27 RX ADMIN — HYDRALAZINE HYDROCHLORIDE 10 MG: 20 INJECTION INTRAMUSCULAR; INTRAVENOUS at 16:36

## 2021-09-27 RX ADMIN — SENNOSIDES AND DOCUSATE SODIUM 1 TABLET: 50; 8.6 TABLET ORAL at 08:34

## 2021-09-27 RX ADMIN — TAMSULOSIN HYDROCHLORIDE 0.4 MG: 0.4 CAPSULE ORAL at 10:51

## 2021-09-27 RX ADMIN — THIAMINE HCL TAB 100 MG 100 MG: 100 TAB at 08:34

## 2021-09-27 RX ADMIN — POLYETHYLENE GLYCOL 3350 17 G: 17 POWDER, FOR SOLUTION ORAL at 08:35

## 2021-09-27 RX ADMIN — FOLIC ACID 1 MG: 1 TABLET ORAL at 08:35

## 2021-09-27 RX ADMIN — PANTOPRAZOLE SODIUM 40 MG: 40 TABLET, DELAYED RELEASE ORAL at 21:25

## 2021-09-27 RX ADMIN — CLONIDINE HYDROCHLORIDE 0.1 MG: 0.1 TABLET ORAL at 00:21

## 2021-09-27 RX ADMIN — Medication 25 MCG: at 21:25

## 2021-09-27 RX ADMIN — Medication 1 TABLET: at 16:40

## 2021-09-27 RX ADMIN — CLONIDINE HYDROCHLORIDE 0.1 MG: 0.1 TABLET ORAL at 08:35

## 2021-09-27 ASSESSMENT — ACTIVITIES OF DAILY LIVING (ADL)
ADLS_ACUITY_SCORE: 26
ADLS_ACUITY_SCORE: 22
ADLS_ACUITY_SCORE: 26
ADLS_ACUITY_SCORE: 22

## 2021-09-27 NOTE — PROGRESS NOTES
[facility]  Hospitalist Progress Note  Austyn Agustin MD  09/27/2021        Assessment & Plan   Armen Phelps is a 76 year old male who was admitted on 9/23/2021. past medical history significant for hypertension, hyperlipidemia, GERD, alcohol dependence admitted for elective L1-2 posterior lumbar fusion with revision decompression and discectomy.  MedicineTeam consulted for comanagement.     Mr. Phelps is a daily alcohol user.  He went into severe withdrawal in the evening of 9/23-9/24.  Wife is unsure when he had last drink in discussion with her on 09/26.  He is on CIWA protocol.    At this point we are starting high-dose IV thiamine given that he has some ongoing encephalopathy with alcohol withdrawal.     #Alcohol use d/o with withdrawal: Patient went into severe withdrawal on the evening of 9/23-9/24.  He has been placed on CIWA protocol with vitamin therapy.  He has continued to show signs of alcohol withdrawal but suspect he should have reduced benzo needs over next 24-48 hours as he would be outside window of severe withdrawal assuming his last drink was Wed 09/22.  -Continue to monitor for withdrawal.  Ativan available as needed. Clonidine low dose with hold parameters.    -Discussed importance of abstaining from all alcohol moving forward with wife.    --Started high-dose IV thiamine on 9/27 for encephalopathy.     #Status post L1-L2 fusion revision decompression and discectomy: Underwent surgery on 9/23.  Tolerated well.  -Postop pain management anticoagulation physical therapy per primary team  -Physical therapy consulted.  Patient will likely need TCU I suspect.     #Hypertension: Prior to admission patient was on lisinopril but was discontinued due to renal insufficiency  -As needed hydralazine available.  He is also on clonidine with hold parameters for withdrawal.      #Thrombocytopenia: Suspect secondary to alcohol use. Monitor. No evidence of bleeding. Hgb  stable.    #Hyperlipidemia: Continue statin     #Hypokalemia: Replacement protocol     #GERD  -Continue PPI    Toxic metabolic encephalopathy due to alcohol withdrawal     DVT Prophylaxis: Defer to primary service  Code Status: Full Code  Dispo: Likely in 1 to 2 days.  PT recommending TCU.    Austyn Agustin MD  Text Page    Interval History   Pt with continues withdrawal symptoms, has a sitter in place  Starting high-dose IV thiamine      -Data reviewed today: I reviewed all new labs and imaging results over the last 24 hours.     Physical Exam   Temp: 97.7  F (36.5  C) Temp src: Temporal BP: 135/82 Pulse: 88   Resp: 12 SpO2: 97 % O2 Device: None (Room air)    Vitals:    09/23/21 0559   Weight: 83.5 kg (184 lb)     Vital Signs with Ranges  Temp:  [97.7  F (36.5  C)-98.2  F (36.8  C)] 97.7  F (36.5  C)  Pulse:  [73-92] 88  Resp:  [12-20] 12  BP: (135-171)/() 135/82  SpO2:  [95 %-98 %] 97 %  I/O last 3 completed shifts:  In: 600 [P.O.:600]  Out: 1770 [Urine:1770]     Constitutional: Lying in bed. Sleeping but arousable to voice and stimulation. Wife at bedside.   HEENT: Normocephalic. MMM, No elevation of JVD noted.   Respiratory: Nl WOB, Clear bilaterally, No wheezes or crackles  Cardiovascular: Regular, no murmur  GI: BS+, NT, ND  Skin/Integumen: WWP, no rash. No edema  Neuro: Sleeping. Arousable to voice. Follows commands then goes back to sleep. Moves all extremities.     Medications       calcium carbonate-vitamin D  1 tablet Oral TID AC     cloNIDine  0.1 mg Oral Q8H     folic acid  1 mg Oral Daily     multivitamin w/minerals  1 tablet Oral Daily     pantoprazole  40 mg Oral QPM     polyethylene glycol  17 g Oral Daily     QUEtiapine  25 mg Oral At Bedtime     senna-docusate  1 tablet Oral BID     sodium chloride (PF)  3 mL Intracatheter Q8H     tamsulosin  0.4 mg Oral Daily     thiamine  500 mg Intravenous TID    Followed by     [START ON 9/29/2021] thiamine  250 mg Intravenous Daily     Followed by     [START ON 10/4/2021] thiamine  100 mg Oral Daily     cholecalciferol  25 mcg Oral BID       Data   Recent Labs   Lab 09/27/21  0643 09/26/21  1337 09/26/21  0613 09/25/21  1340 09/25/21  0740   WBC 5.1  --  5.9  --   --    HGB 11.4*  --  11.7*  --  10.8*   *  --  110*  --   --    *  --  125*  --   --      --  140  --  141   POTASSIUM 3.7 3.8 3.2*   < > 3.3*   CHLORIDE 107  --  106  --  111*   CO2 27  --  28  --  26   BUN 10  --  7  --  6*   CR 0.79  --  0.70  --  0.70   ANIONGAP 6  --  6  --  4   DESTINEE 8.7  --  8.7  --  8.3*   *  --  99  --  96   ALBUMIN  --   --  2.9*  --   --    PROTTOTAL  --   --  6.2*  --   --    BILITOTAL  --   --  1.0  --   --    ALKPHOS  --   --  49  --   --    ALT  --   --  16  --   --    AST  --   --  23  --   --     < > = values in this interval not displayed.       No results found for this or any previous visit (from the past 24 hour(s)).

## 2021-09-27 NOTE — PLAN OF CARE
Pt confused, restless and agitated at times, attendant at the bed side. New order for Flomax as he kept voiding small amounts frequently, blood tinged at times, some relief as pt started voiding almost every 2 hours instead of 1 or less. Reed removed on 09/23 per chart review. Dressing CDI, spouse at the bedside for a while. Scoring low on CIWA. Will keep monitoring.

## 2021-09-28 ENCOUNTER — APPOINTMENT (OUTPATIENT)
Dept: PHYSICAL THERAPY | Facility: CLINIC | Age: 76
DRG: 459 | End: 2021-09-28
Attending: ORTHOPAEDIC SURGERY
Payer: COMMERCIAL

## 2021-09-28 LAB
BACTERIA WND CULT: NO GROWTH
POTASSIUM BLD-SCNC: 3.7 MMOL/L (ref 3.4–5.3)

## 2021-09-28 PROCEDURE — 250N000013 HC RX MED GY IP 250 OP 250 PS 637: Performed by: PHYSICIAN ASSISTANT

## 2021-09-28 PROCEDURE — 84132 ASSAY OF SERUM POTASSIUM: CPT | Performed by: INTERNAL MEDICINE

## 2021-09-28 PROCEDURE — 250N000013 HC RX MED GY IP 250 OP 250 PS 637: Performed by: INTERNAL MEDICINE

## 2021-09-28 PROCEDURE — 97530 THERAPEUTIC ACTIVITIES: CPT | Mod: GP | Performed by: PHYSICAL THERAPIST

## 2021-09-28 PROCEDURE — 97116 GAIT TRAINING THERAPY: CPT | Mod: GP | Performed by: PHYSICAL THERAPIST

## 2021-09-28 PROCEDURE — 258N000003 HC RX IP 258 OP 636: Performed by: INTERNAL MEDICINE

## 2021-09-28 PROCEDURE — 250N000011 HC RX IP 250 OP 636: Performed by: INTERNAL MEDICINE

## 2021-09-28 PROCEDURE — 120N000001 HC R&B MED SURG/OB

## 2021-09-28 PROCEDURE — 36415 COLL VENOUS BLD VENIPUNCTURE: CPT | Performed by: INTERNAL MEDICINE

## 2021-09-28 PROCEDURE — 99233 SBSQ HOSP IP/OBS HIGH 50: CPT | Performed by: INTERNAL MEDICINE

## 2021-09-28 RX ADMIN — SENNOSIDES AND DOCUSATE SODIUM 1 TABLET: 50; 8.6 TABLET ORAL at 20:15

## 2021-09-28 RX ADMIN — CLONIDINE HYDROCHLORIDE 0.1 MG: 0.1 TABLET ORAL at 10:42

## 2021-09-28 RX ADMIN — THIAMINE HYDROCHLORIDE 500 MG: 100 INJECTION, SOLUTION INTRAMUSCULAR; INTRAVENOUS at 14:04

## 2021-09-28 RX ADMIN — Medication 1 TABLET: at 14:04

## 2021-09-28 RX ADMIN — THIAMINE HYDROCHLORIDE 500 MG: 100 INJECTION, SOLUTION INTRAMUSCULAR; INTRAVENOUS at 10:43

## 2021-09-28 RX ADMIN — FOLIC ACID 1 MG: 1 TABLET ORAL at 10:42

## 2021-09-28 RX ADMIN — PANTOPRAZOLE SODIUM 40 MG: 40 TABLET, DELAYED RELEASE ORAL at 20:15

## 2021-09-28 RX ADMIN — POLYETHYLENE GLYCOL 3350 17 G: 17 POWDER, FOR SOLUTION ORAL at 10:43

## 2021-09-28 RX ADMIN — QUETIAPINE FUMARATE 25 MG: 25 TABLET ORAL at 21:55

## 2021-09-28 RX ADMIN — CLONIDINE HYDROCHLORIDE 0.1 MG: 0.1 TABLET ORAL at 00:17

## 2021-09-28 RX ADMIN — Medication 1 TABLET: at 10:42

## 2021-09-28 RX ADMIN — SENNOSIDES AND DOCUSATE SODIUM 1 TABLET: 50; 8.6 TABLET ORAL at 10:42

## 2021-09-28 RX ADMIN — Medication 25 MCG: at 20:15

## 2021-09-28 RX ADMIN — MULTIPLE VITAMINS W/ MINERALS TAB 1 TABLET: TAB at 10:42

## 2021-09-28 RX ADMIN — Medication 25 MCG: at 10:43

## 2021-09-28 RX ADMIN — Medication 1 LOZENGE: at 00:22

## 2021-09-28 RX ADMIN — Medication 1 TABLET: at 17:54

## 2021-09-28 RX ADMIN — TAMSULOSIN HYDROCHLORIDE 0.4 MG: 0.4 CAPSULE ORAL at 10:42

## 2021-09-28 RX ADMIN — CLONIDINE HYDROCHLORIDE 0.1 MG: 0.1 TABLET ORAL at 17:54

## 2021-09-28 RX ADMIN — ACETAMINOPHEN 650 MG: 325 TABLET, FILM COATED ORAL at 10:43

## 2021-09-28 RX ADMIN — THIAMINE HYDROCHLORIDE 500 MG: 100 INJECTION, SOLUTION INTRAMUSCULAR; INTRAVENOUS at 21:32

## 2021-09-28 RX ADMIN — ACETAMINOPHEN 650 MG: 325 TABLET, FILM COATED ORAL at 17:54

## 2021-09-28 ASSESSMENT — ACTIVITIES OF DAILY LIVING (ADL)
ADLS_ACUITY_SCORE: 19
ADLS_ACUITY_SCORE: 22
ADLS_ACUITY_SCORE: 22
ADLS_ACUITY_SCORE: 21
ADLS_ACUITY_SCORE: 21
ADLS_ACUITY_SCORE: 22

## 2021-09-28 NOTE — PROGRESS NOTES
"Ortho Rounding Note    S: Pt up in chair, eating lunch. Conversant and able to verbalize that his pain is controlled, much improved from pre op. No ortho concerns     O:  Vital signs:   Blood pressure (!) 163/93, pulse 93, temperature 98.1  F (36.7  C), temperature source Temporal, resp. rate 16, height 1.778 m (5' 10\"), weight 83.5 kg (184 lb), SpO2 97 %.  Estimated body mass index is 26.4 kg/m  as calculated from the following:    Height as of this encounter: 1.778 m (5' 10\").    Weight as of this encounter: 83.5 kg (184 lb).      Intake/Output Summary (Last 24 hours) at 9/28/2021 1303  Last data filed at 9/28/2021 1118  Gross per 24 hour   Intake 960 ml   Output 2400 ml   Net -1440 ml         Dressings c/d/i  5/5 motor and SPLT in BL UE and LE except for baseline left 4/5 TA weakness     A:  POD #5 s/p L1-L2 posterior instrumented fusion    P:  General: Pt doing well this afternoon. Still confused, on CIWA, utilizing ativan and need for 1:1 in room. Ambulated in halls today and reportedly tolerated well. Continue with planned PT/OT and daily cares.   Pain: PO  Act: up ad sowmya, with therapy  DVT: Mech only  ID: routine postop abx to be completed 24 hours after surgery  Dispo: Pt still requiring 1:1 in room and need for Ativan. At this time pt would not clear to discharge to TCU. Once improved, TCU expected based on recommendations from PT.     Appreciate Medicine consult for medical management    Wilder Guy PA-C    "

## 2021-09-28 NOTE — PLAN OF CARE
"End of shift summary- 2330-0700    A/O: - confusion 1:1   Diet: Regular   Transfer: A2    Bathroom: Frequent urination   Pain: Tylenol PRN available   Treatment: HTN on eves - prn hydralazine, K+ protocol   Discharge Plans: tbd - PT recs TCU         Blood pressure 106/54, pulse 69, temperature 98.3  F (36.8  C), temperature source Temporal, resp. rate 16, height 1.778 m (5' 10\"), weight 83.5 kg (184 lb), SpO2 97 %.   "

## 2021-09-28 NOTE — PROGRESS NOTES
[facility]  Hospitalist Progress Note  Austyn Agustin MD  09/28/2021        Assessment & Plan   Armen Phelps is a 76 year old male who was admitted on 9/23/2021. past medical history significant for hypertension, hyperlipidemia, GERD, alcohol dependence admitted for elective L1-2 posterior lumbar fusion with revision decompression and discectomy.  MedicineTeam consulted for comanagement.     Mr. Phelps is a daily alcohol user.  He went into severe withdrawal in the evening of 9/23-9/24.  Wife is unsure when he had last drink in discussion with her on 09/26.  He is on CIWA protocol.    At this point we are starting high-dose IV thiamine given that he has some ongoing encephalopathy with alcohol withdrawal.    Seems to be gradually improving.     #Alcohol use d/o with withdrawal, delirium: went into severe withdrawal on the evening of 9/23-9/24.  He has been placed on CIWA protocol with vitamin therapy.  Still has some ongoing disorientation.  -Continue to monitor for withdrawal.  Ativan available as needed.  -Discussed importance of abstaining from all alcohol moving forward with wife.    --Started high-dose IV thiamine on 9/27 for encephalopathy.     #Status post L1-L2 fusion revision decompression and discectomy: Underwent surgery on 9/23.  Tolerated well.  -Postop pain management anticoagulation physical therapy per primary team  -Physical therapy consulted.  Patient will likely need TCU I suspect.     #Hypertension: Prior to admission patient was on lisinopril but was discontinued due to renal insufficiency  -As needed hydralazine available.       #Thrombocytopenia: Suspect secondary to alcohol use. Monitor. No evidence of bleeding. Hgb stable.    #Hyperlipidemia: Continue statin     #Hypokalemia: Replacement protocol     #GERD  -Continue PPI    Toxic metabolic encephalopathy due to alcohol withdrawal     DVT Prophylaxis: Defer to primary service  Code Status: Full Code  Dispo:  Likely in 1 to 2 days.  PT recommending TCU    Austyn Agustin MD  Text Page    Interval History   Pt with continues withdrawal symptoms, has a sitter in place  Decreasing benzo needs but still delirious  Updated his wife at the bedside  Mobility improving.      -Data reviewed today: I reviewed all new labs and imaging results over the last 24 hours.     Physical Exam   Temp: 98.1  F (36.7  C) Temp src: Temporal BP: (!) 163/93 Pulse: 93   Resp: 16 SpO2: 97 % O2 Device: None (Room air)    Vitals:    09/23/21 0559   Weight: 83.5 kg (184 lb)     Vital Signs with Ranges  Temp:  [97.7  F (36.5  C)-98.8  F (37.1  C)] 98.1  F (36.7  C)  Pulse:  [69-98] 93  Resp:  [12-16] 16  BP: (106-189)/() 163/93  SpO2:  [97 %-98 %] 97 %  I/O last 3 completed shifts:  In: 1300 [P.O.:1300]  Out: 2550 [Urine:2550]     Constitutional: up in chair. Awake, seems calm but not fully oriented to recent events. Wife at bedside.   HEENT: Normocephalic. MMM, No elevation of JVD noted.   Respiratory: Nl WOB, Clear bilaterally, No wheezes or crackles  Cardiovascular: Regular, no murmur  GI: BS+, NT, ND  Skin/Integumen: WWP, no rash. No edema  Neuro:calm, non-focal.  Not oriented to recent events, mis-stated his age.  ?confabulation.    Medications       calcium carbonate-vitamin D  1 tablet Oral TID AC     cloNIDine  0.1 mg Oral Q8H     folic acid  1 mg Oral Daily     multivitamin w/minerals  1 tablet Oral Daily     pantoprazole  40 mg Oral QPM     polyethylene glycol  17 g Oral Daily     QUEtiapine  25 mg Oral At Bedtime     senna-docusate  1 tablet Oral BID     sodium chloride (PF)  3 mL Intracatheter Q8H     tamsulosin  0.4 mg Oral Daily     thiamine  500 mg Intravenous TID    Followed by     [START ON 9/29/2021] thiamine  250 mg Intravenous Daily    Followed by     [START ON 10/4/2021] thiamine  100 mg Oral Daily     cholecalciferol  25 mcg Oral BID       Data   Recent Labs   Lab 09/28/21  0809 09/27/21  0643 09/26/21  4827  09/26/21  0613 09/26/21  0613 09/25/21  1340 09/25/21  0740   WBC  --  5.1  --   --  5.9  --   --    HGB  --  11.4*  --   --  11.7*  --  10.8*   MCV  --  110*  --   --  110*  --   --    PLT  --  137*  --   --  125*  --   --    NA  --  140  --   --  140  --  141   POTASSIUM 3.7 3.7 3.8   < > 3.2*   < > 3.3*   CHLORIDE  --  107  --   --  106  --  111*   CO2  --  27  --   --  28  --  26   BUN  --  10  --   --  7  --  6*   CR  --  0.79  --   --  0.70  --  0.70   ANIONGAP  --  6  --   --  6  --  4   DESTINEE  --  8.7  --   --  8.7  --  8.3*   GLC  --  100*  --   --  99  --  96   ALBUMIN  --   --   --   --  2.9*  --   --    PROTTOTAL  --   --   --   --  6.2*  --   --    BILITOTAL  --   --   --   --  1.0  --   --    ALKPHOS  --   --   --   --  49  --   --    ALT  --   --   --   --  16  --   --    AST  --   --   --   --  23  --   --     < > = values in this interval not displayed.       No results found for this or any previous visit (from the past 24 hour(s)).

## 2021-09-28 NOTE — PLAN OF CARE
Pt oriented to self only, however pleasant and appropriate today.  Up Ax1 to bathroom.  Pt very impulsive, PSC present in room.  IV saline locked.  Tylenol adminstered for pain.  CMS intact.  Dressing to back CDI.  Voiding adequate amounts.  Awaiting PSC free and TCU placement.

## 2021-09-29 ENCOUNTER — APPOINTMENT (OUTPATIENT)
Dept: PHYSICAL THERAPY | Facility: CLINIC | Age: 76
DRG: 459 | End: 2021-09-29
Attending: ORTHOPAEDIC SURGERY
Payer: COMMERCIAL

## 2021-09-29 LAB — POTASSIUM BLD-SCNC: 3.7 MMOL/L (ref 3.4–5.3)

## 2021-09-29 PROCEDURE — 99232 SBSQ HOSP IP/OBS MODERATE 35: CPT | Performed by: INTERNAL MEDICINE

## 2021-09-29 PROCEDURE — 97116 GAIT TRAINING THERAPY: CPT | Mod: GP | Performed by: PHYSICAL THERAPIST

## 2021-09-29 PROCEDURE — 250N000013 HC RX MED GY IP 250 OP 250 PS 637: Performed by: INTERNAL MEDICINE

## 2021-09-29 PROCEDURE — 258N000003 HC RX IP 258 OP 636: Performed by: INTERNAL MEDICINE

## 2021-09-29 PROCEDURE — 250N000011 HC RX IP 250 OP 636: Performed by: INTERNAL MEDICINE

## 2021-09-29 PROCEDURE — 250N000013 HC RX MED GY IP 250 OP 250 PS 637: Performed by: PHYSICIAN ASSISTANT

## 2021-09-29 PROCEDURE — 84132 ASSAY OF SERUM POTASSIUM: CPT | Performed by: INTERNAL MEDICINE

## 2021-09-29 PROCEDURE — 120N000001 HC R&B MED SURG/OB

## 2021-09-29 PROCEDURE — 999N000111 HC STATISTIC OT IP EVAL DEFER

## 2021-09-29 PROCEDURE — 36415 COLL VENOUS BLD VENIPUNCTURE: CPT | Performed by: INTERNAL MEDICINE

## 2021-09-29 RX ADMIN — CLONIDINE HYDROCHLORIDE 0.1 MG: 0.1 TABLET ORAL at 16:35

## 2021-09-29 RX ADMIN — PANTOPRAZOLE SODIUM 40 MG: 40 TABLET, DELAYED RELEASE ORAL at 20:14

## 2021-09-29 RX ADMIN — ACETAMINOPHEN 650 MG: 325 TABLET, FILM COATED ORAL at 10:39

## 2021-09-29 RX ADMIN — Medication 25 MCG: at 08:13

## 2021-09-29 RX ADMIN — THIAMINE HYDROCHLORIDE 250 MG: 100 INJECTION, SOLUTION INTRAMUSCULAR; INTRAVENOUS at 08:16

## 2021-09-29 RX ADMIN — CLONIDINE HYDROCHLORIDE 0.1 MG: 0.1 TABLET ORAL at 00:52

## 2021-09-29 RX ADMIN — Medication 1 TABLET: at 16:35

## 2021-09-29 RX ADMIN — OXYCODONE HYDROCHLORIDE 5 MG: 5 TABLET ORAL at 05:01

## 2021-09-29 RX ADMIN — FOLIC ACID 1 MG: 1 TABLET ORAL at 08:13

## 2021-09-29 RX ADMIN — POLYETHYLENE GLYCOL 3350 17 G: 17 POWDER, FOR SOLUTION ORAL at 08:13

## 2021-09-29 RX ADMIN — SENNOSIDES AND DOCUSATE SODIUM 1 TABLET: 50; 8.6 TABLET ORAL at 08:13

## 2021-09-29 RX ADMIN — Medication 25 MCG: at 20:14

## 2021-09-29 RX ADMIN — Medication 1 TABLET: at 12:38

## 2021-09-29 RX ADMIN — TAMSULOSIN HYDROCHLORIDE 0.4 MG: 0.4 CAPSULE ORAL at 08:13

## 2021-09-29 RX ADMIN — QUETIAPINE FUMARATE 25 MG: 25 TABLET ORAL at 21:32

## 2021-09-29 RX ADMIN — Medication 1 TABLET: at 06:33

## 2021-09-29 RX ADMIN — ACETAMINOPHEN 650 MG: 325 TABLET, FILM COATED ORAL at 05:01

## 2021-09-29 RX ADMIN — CLONIDINE HYDROCHLORIDE 0.1 MG: 0.1 TABLET ORAL at 08:13

## 2021-09-29 RX ADMIN — MULTIPLE VITAMINS W/ MINERALS TAB 1 TABLET: TAB at 08:13

## 2021-09-29 RX ADMIN — SENNOSIDES AND DOCUSATE SODIUM 1 TABLET: 50; 8.6 TABLET ORAL at 20:14

## 2021-09-29 ASSESSMENT — ACTIVITIES OF DAILY LIVING (ADL)
ADLS_ACUITY_SCORE: 18
ADLS_ACUITY_SCORE: 18
ADLS_ACUITY_SCORE: 20
ADLS_ACUITY_SCORE: 18

## 2021-09-29 NOTE — PLAN OF CARE
Admitting Diagnosis: L1-L2 PLIF   Pertinent History: Alcohol dependence, GERD  Living Situation: Home with spouse  Pain plan: oxycodone, tylenol, pt. Have 9/10 pain, after getting pain meds, fell to sleep.  Mobility: assistance, 1 person  Baseline activity: assist stand by  Alarms/Safety: Bed Alarm  LDA's: Peripheral  Pertinent test results: K-3.7  Consults: Social Work or Care Coordination   Abnormals/Pending: none,  Other Cares/Comments: incision on lynne is CDI, covered with waterproof dressing.   Discharge Disposition: Transitional Care Unit  Discharge Time: TBD

## 2021-09-29 NOTE — PLAN OF CARE
"BP (!) 149/75   Pulse 85   Temp 98  F (36.7  C) (Temporal)   Resp 20   Ht 1.778 m (5' 10\")   Wt 83.5 kg (184 lb)   SpO2 99%   BMI 26.40 kg/m      Pt is disoriented to time, situation and place. LS clear. Trace edema on BLE. Aquacel to lumbar spine from fusion - CDI. Up Assist x 1, sitter at bedside. CIWA 4. PSC at bedside. Plan to discharge to TCU when off sitter for 24 hrs.   "

## 2021-09-29 NOTE — PROGRESS NOTES
Care Management Follow Up    Length of Stay (days): 6    Expected Discharge Date: 09/30/2021     Concerns to be Addressed:       Patient plan of care discussed at interdisciplinary rounds: Yes    Anticipated Discharge Disposition:  TCU     Anticipated Discharge Services:    Anticipated Discharge DME:      Patient/family educated on Medicare website which has current facility and service quality ratings:    Education Provided on the Discharge Plan:    Patient/Family in Agreement with the Plan:      Referrals Placed by CM/SW:    Private pay costs discussed: Not applicable    Additional Information:  CM called pts spouse, Cam has to leave  for call back to discuss discharge planning. Waiting for call back. PT currently recommending TCU.    Will continue to follow with discharge planning.    Susanne Guy RN, BSN CTS  Care Coordinator  [facility]   [phone]

## 2021-09-29 NOTE — PLAN OF CARE
OT: Orders received. Chart reviewed and discussed with care team.  OT not indicated as pt with planned discharge to TCU due to safety concern and poor safety awareness.  Defer discharge recommendations to PT/care team.  Will complete IP OT orders.

## 2021-09-29 NOTE — PLAN OF CARE
Pt disoriented to place, time and situation.  VSS, afebrile.  Pain managed with tylenol.  CMS intact, trace edema to BLE.  Aquacel to back CDI.  Up with A1, WW and gaitbelt ambulating in room and vazquez.  Voiding adequately.  Large BM this shift.  Tolerating regular diet.  CIWA 6 and 4 (anxiety, agitation and orientation). Pt impulsive, PSC at bedside, alarms on for safety.  Wife at bedside.  Pt discharge to TCU when able to be PSC free for 24 hours.  Will continue to monitor.

## 2021-09-30 ENCOUNTER — APPOINTMENT (OUTPATIENT)
Dept: PHYSICAL THERAPY | Facility: CLINIC | Age: 76
DRG: 459 | End: 2021-09-30
Attending: ORTHOPAEDIC SURGERY
Payer: COMMERCIAL

## 2021-09-30 LAB
ANION GAP SERPL CALCULATED.3IONS-SCNC: 5 MMOL/L (ref 3–14)
BACTERIA WND CULT: NORMAL
BUN SERPL-MCNC: 8 MG/DL (ref 7–30)
CALCIUM SERPL-MCNC: 9 MG/DL (ref 8.5–10.1)
CHLORIDE BLD-SCNC: 106 MMOL/L (ref 94–109)
CO2 SERPL-SCNC: 27 MMOL/L (ref 20–32)
CREAT SERPL-MCNC: 0.8 MG/DL (ref 0.66–1.25)
ERYTHROCYTE [DISTWIDTH] IN BLOOD BY AUTOMATED COUNT: 15.3 % (ref 10–15)
GFR SERPL CREATININE-BSD FRML MDRD: 87 ML/MIN/1.73M2
GLUCOSE BLD-MCNC: 97 MG/DL (ref 70–99)
HCT VFR BLD AUTO: 34.1 % (ref 40–53)
HGB BLD-MCNC: 10.8 G/DL (ref 13.3–17.7)
MCH RBC QN AUTO: 35 PG (ref 26.5–33)
MCHC RBC AUTO-ENTMCNC: 31.7 G/DL (ref 31.5–36.5)
MCV RBC AUTO: 110 FL (ref 78–100)
PLATELET # BLD AUTO: 166 10E3/UL (ref 150–450)
POTASSIUM BLD-SCNC: 4.1 MMOL/L (ref 3.4–5.3)
RBC # BLD AUTO: 3.09 10E6/UL (ref 4.4–5.9)
SARS-COV-2 RNA RESP QL NAA+PROBE: NEGATIVE
SODIUM SERPL-SCNC: 138 MMOL/L (ref 133–144)
WBC # BLD AUTO: 4.1 10E3/UL (ref 4–11)

## 2021-09-30 PROCEDURE — 87635 SARS-COV-2 COVID-19 AMP PRB: CPT | Performed by: STUDENT IN AN ORGANIZED HEALTH CARE EDUCATION/TRAINING PROGRAM

## 2021-09-30 PROCEDURE — 99232 SBSQ HOSP IP/OBS MODERATE 35: CPT | Performed by: STUDENT IN AN ORGANIZED HEALTH CARE EDUCATION/TRAINING PROGRAM

## 2021-09-30 PROCEDURE — 250N000011 HC RX IP 250 OP 636: Performed by: INTERNAL MEDICINE

## 2021-09-30 PROCEDURE — 250N000013 HC RX MED GY IP 250 OP 250 PS 637: Performed by: PHYSICIAN ASSISTANT

## 2021-09-30 PROCEDURE — 258N000003 HC RX IP 258 OP 636: Performed by: INTERNAL MEDICINE

## 2021-09-30 PROCEDURE — 250N000013 HC RX MED GY IP 250 OP 250 PS 637: Performed by: INTERNAL MEDICINE

## 2021-09-30 PROCEDURE — 36415 COLL VENOUS BLD VENIPUNCTURE: CPT | Performed by: INTERNAL MEDICINE

## 2021-09-30 PROCEDURE — 80048 BASIC METABOLIC PNL TOTAL CA: CPT | Performed by: INTERNAL MEDICINE

## 2021-09-30 PROCEDURE — 97116 GAIT TRAINING THERAPY: CPT | Mod: GP

## 2021-09-30 PROCEDURE — 120N000001 HC R&B MED SURG/OB

## 2021-09-30 PROCEDURE — 85048 AUTOMATED LEUKOCYTE COUNT: CPT | Performed by: INTERNAL MEDICINE

## 2021-09-30 RX ADMIN — MULTIPLE VITAMINS W/ MINERALS TAB 1 TABLET: TAB at 08:13

## 2021-09-30 RX ADMIN — PANTOPRAZOLE SODIUM 40 MG: 40 TABLET, DELAYED RELEASE ORAL at 20:16

## 2021-09-30 RX ADMIN — TAMSULOSIN HYDROCHLORIDE 0.4 MG: 0.4 CAPSULE ORAL at 08:13

## 2021-09-30 RX ADMIN — CLONIDINE HYDROCHLORIDE 0.1 MG: 0.1 TABLET ORAL at 15:26

## 2021-09-30 RX ADMIN — QUETIAPINE FUMARATE 25 MG: 25 TABLET ORAL at 22:09

## 2021-09-30 RX ADMIN — SENNOSIDES AND DOCUSATE SODIUM 1 TABLET: 50; 8.6 TABLET ORAL at 08:13

## 2021-09-30 RX ADMIN — Medication 25 MCG: at 08:13

## 2021-09-30 RX ADMIN — THIAMINE HYDROCHLORIDE 250 MG: 100 INJECTION, SOLUTION INTRAMUSCULAR; INTRAVENOUS at 08:19

## 2021-09-30 RX ADMIN — CLONIDINE HYDROCHLORIDE 0.1 MG: 0.1 TABLET ORAL at 08:13

## 2021-09-30 RX ADMIN — Medication 1 TABLET: at 17:58

## 2021-09-30 RX ADMIN — Medication 1 TABLET: at 08:13

## 2021-09-30 RX ADMIN — ACETAMINOPHEN 650 MG: 325 TABLET, FILM COATED ORAL at 15:25

## 2021-09-30 RX ADMIN — Medication 1 TABLET: at 12:23

## 2021-09-30 RX ADMIN — FOLIC ACID 1 MG: 1 TABLET ORAL at 08:13

## 2021-09-30 RX ADMIN — Medication 25 MCG: at 20:16

## 2021-09-30 RX ADMIN — ACETAMINOPHEN 650 MG: 325 TABLET, FILM COATED ORAL at 08:13

## 2021-09-30 RX ADMIN — POLYETHYLENE GLYCOL 3350 17 G: 17 POWDER, FOR SOLUTION ORAL at 08:13

## 2021-09-30 RX ADMIN — SENNOSIDES AND DOCUSATE SODIUM 1 TABLET: 50; 8.6 TABLET ORAL at 20:16

## 2021-09-30 RX ADMIN — CLONIDINE HYDROCHLORIDE 0.1 MG: 0.1 TABLET ORAL at 01:12

## 2021-09-30 ASSESSMENT — ACTIVITIES OF DAILY LIVING (ADL)
ADLS_ACUITY_SCORE: 20

## 2021-09-30 NOTE — PROGRESS NOTES
"CLINICAL NUTRITION SERVICES  -  ASSESSMENT NOTE    Recommendations Ordered by Registered Dietitian (RD):   Continue diet as ordered   Ordered Ensure Enlive PRN    Malnutrition:   % Weight Loss:  None noted  % Intake:  <75% for > 7 days (non-severe malnutrition)  Subcutaneous Fat Loss: Orbital region mild depletion --> will not use given only one indicator   Muscle Loss: Temporal region mild depletion and Acromion bone region mild depletion  Fluid Retention: trace    Malnutrition Diagnosis: Non-Severe malnutrition  In Context of:  Environmental or social circumstances     REASON FOR ASSESSMENT  Armen Phelps is a 76 year old male seen by Registered Dietitian for LOS    NUTRITION HISTORY  - Information obtained from patient and chart   - Patient admitted for Status post L1-L2 fusion revision decompression and discectomy  - History of hypertension, hyperlipidemia, GERD, alcohol dependence admitted for elective L1-2 posterior lumbar fusion with revision decompression and discectomy  - Unable to obtain nutrition history from patient at this time, pt was very distracted with TV remote and it is documented that the patient is confused and disoriented to time and situation.   - Food allergies: NKFA    CURRENT NUTRITION ORDERS  Diet Order:     Regular Diet     Current Intake/Tolerance:  Upon review of the flowsheets, pt is consuming % of meals ordered. Ordering meals TID throughout admit.     NUTRITION FOCUSED PHYSICAL ASSESSMENT FOR DIAGNOSING MALNUTRITION)  Yes         Observed:    Muscle wasting (refer to documentation in Malnutrition section) and Subcutaneous fat loss (refer to documentation in Malnutrition section)    Obtained from Chart/Interdisciplinary Team:  - Pt underwent procedure for L1-L2 fusion revision decompression and discectomy on 9/23  - Code 21 called on 9/24    ANTHROPOMETRICS  Height: 5' 10\"  Weight: 83.5 kg ( 184 lbs 0 oz)   Body mass index is 26.4 kg/m .  Weight Status:  Overweight BMI " 25-29.9  Weight History: weight remains fairly consistent. No weight loss noted  Wt Readings from Last 10 Encounters:   09/23/21 83.5 kg (184 lb)   07/15/21 83.5 kg (184 lb)   05/25/21 85.3 kg (188 lb)   05/13/21 83.5 kg (184 lb)   05/06/21 83.6 kg (184 lb 3.2 oz)   04/12/21 78.9 kg (174 lb)   12/15/20 81.6 kg (179 lb 12.8 oz)   10/23/20 82.8 kg (182 lb 9.6 oz)   06/17/20 80.6 kg (177 lb 12.8 oz)   07/09/19 81.6 kg (180 lb)     LABS  Labs reviewed    Labs:  Electrolytes  Potassium (mmol/L)   Date Value   09/30/2021 4.1   09/29/2021 3.7   09/28/2021 3.7   07/15/2021 3.68   04/26/2021 3.55   04/12/2021 3.73     Phosphorus (mg/dL)   Date Value   09/23/2021 2.9    Blood Glucose  Glucose (mg/dL)   Date Value   09/30/2021 97   09/27/2021 100 (H)   09/26/2021 99   09/25/2021 96   09/24/2021 113 (H)   07/15/2021 112 (A)   04/26/2021 103 (A)   04/12/2021 145 (A)   06/17/2020 150 (A)   07/09/2019 107 (H)     Hemoglobin A1C (%)   Date Value   08/05/2020 4.9    Inflammatory Markers  WBC (10*9/L)   Date Value   07/15/2021 5.4   10/23/2020 4.9   06/17/2020 4.3     WBC Count (10e3/uL)   Date Value   09/30/2021 4.1   09/27/2021 5.1   09/26/2021 5.9     Albumin (g/dL)   Date Value   09/26/2021 2.9 (L)   06/17/2020 4.0   07/09/2019 3.7   10/20/2018 4.4      Magnesium (mg/dL)   Date Value   09/27/2021 2.0   09/26/2021 1.7   09/23/2021 1.8     Sodium (mmol/L)   Date Value   09/30/2021 138   09/27/2021 140   09/26/2021 140   07/15/2021 141.0   04/26/2021 128.8 (A)   04/12/2021 128.6 (A)    Renal  Urea Nitrogen (mg/dL)   Date Value   09/30/2021 8   09/27/2021 10   09/26/2021 7   07/15/2021 6 (A)   04/26/2021 13   04/12/2021 11     BUN/Creatinine Ratio (no units)   Date Value   07/15/2021 6.3   04/26/2021 8.1   04/12/2021 8.0     Creatinine (mg/dL)   Date Value   09/30/2021 0.80   09/27/2021 0.79   09/26/2021 0.70   07/15/2021 0.96   04/26/2021 1.61 (A)   04/12/2021 1.37 (A)     Additional  Ketones Urine (mg/dL)   Date Value   10/23/2020  Other (A)        MEDICATIONS  Medications reviewed    calcium carbonate-vitamin D  1 tablet Oral TID AC     cloNIDine  0.1 mg Oral Q8H     folic acid  1 mg Oral Daily     multivitamin w/minerals  1 tablet Oral Daily     pantoprazole  40 mg Oral QPM     polyethylene glycol  17 g Oral Daily     QUEtiapine  25 mg Oral At Bedtime     senna-docusate  1 tablet Oral BID     sodium chloride (PF)  3 mL Intracatheter Q8H     tamsulosin  0.4 mg Oral Daily     thiamine  250 mg Intravenous Daily    Followed by     [START ON 10/4/2021] thiamine  100 mg Oral Daily     cholecalciferol  25 mcg Oral BID         acetaminophen, sore throat lozenge, bisacodyl, flumazenil, OLANZapine zydis **OR** haloperidol lactate, hydrALAZINE, HYDROmorphone **OR** HYDROmorphone, hydrOXYzine, lidocaine 4%, lidocaine (buffered or not buffered), LORazepam **OR** LORazepam, magnesium hydroxide, melatonin, naloxone **OR** naloxone **OR** naloxone **OR** naloxone, ondansetron **OR** ondansetron, oxyCODONE **OR** oxyCODONE, prochlorperazine **OR** prochlorperazine, sodium chloride (PF)     ASSESSED NUTRITION NEEDS PER APPROVED PRACTICE GUIDELINES:    Dosing Weight 83.5 kg   Estimated Energy Needs: 5832-2718 kcals (25-30 Kcal/Kg)  Justification: maintenance  Estimated Protein Needs:  grams protein (1-1.2+ g pro/Kg)  Justification: preservation of lean body mass  Estimated Fluid Needs: per MD     MALNUTRITION:  % Weight Loss:  None noted  % Intake:  <75% for > 7 days (non-severe malnutrition)  Subcutaneous Fat Loss: Orbital region mild depletion --> will not use given only one indicator   Muscle Loss: Temporal region mild depletion and Acromion bone region mild depletion  Fluid Retention: trace    Malnutrition Diagnosis: Non-Severe malnutrition  In Context of:  Environmental or social circumstances    NUTRITION DIAGNOSIS:  Inadequate oral intake related to suspected variable appetite in the setting of delirium from EtOH withdrawl as evidenced by pt  likely consuming <75% of nutritional needs over the past 7 days.     NUTRITION INTERVENTIONS  Recommendations / Nutrition Prescription  Continue diet as ordered   Ordered Ensure Enlive PRN     Implementation  Nutrition education: Not appropriate at this time due to patient condition  Medical Food Supplement: as above    Nutrition Goals  Pt to consume >/=75% of meals ordered TID     MONITORING AND EVALUATION:  Progress towards goals will be monitored and evaluated per protocol and Practice Guidelines    Elle Aden RD, LD  Clinical Dietitian

## 2021-09-30 NOTE — PROGRESS NOTES
[facility]  Hospitalist Progress Note  Patient Name: Armen Phelps    MRN: [id number]  Provider: Baldev Carcamo,   09/30/21             Assessment and Plan:      Armen Phelps is a 76 year old male who was admitted on 9/23/2021. Past medical history is significant for hypertension, hyperlipidemia, GERD, and alcohol dependence.  He was admitted for elective L1-2 posterior lumbar fusion with revision decompression and discectomy.  MedicineTeam consulted for comanagement.     Mr. Phelps is a daily alcohol user.  He went into severe withdrawal in the evening of 9/23-9/24. This seems to have improved.  Wife is unsure when he had last drink in discussion with her on 09/26.  He is on CIWA protocol.     He has been started on high-dose IV thiamine given that he has some ongoing encephalopathy with alcohol withdrawal. This also seems to have improved.     Overall, seems to be gradually improving.    Assessment & Plan:  Alcohol use d/o with withdrawal, delirium: Severe withdrawal started on the evening of 9/23-9/24. He has been placed on CIWA protocol with vitamin therapy. He has not required ativan in recent days. Still has some ongoing disorientation to place and situation, but this seems to have improved.  -Continue to monitor for withdrawal.  Ativan available as needed. Consider discontinuation of CIWA protocol tomorrow 10/1.  -Previous discussions have been had regarding the importance of abstaining from all alcohol moving forward with wife.    -Continue high-dose IV thiamine on 9/27 for encephalopathy.   -Has had a sitter for impulsiveness - will try without this today 9/30.      Status post L1-L2 fusion revision decompression and discectomy: Underwent surgery on 9/23.  Tolerated well.  -Postop pain management anticoagulation physical therapy per primary team  -Physical therapy consulted and recommending TCU    Hypertension: Prior to admission patient was on lisinopril but this was discontinued due to  "renal insufficiency  -As needed hydralazine available.       Thrombocytopenia,resolved: Suspect secondary to alcohol use. Monitor. No evidence of bleeding. Hgb stable.     Hyperlipidemia: Continue statin     Hypokalemia, resolved: Replacement protocol     GERD: Continue PPI     Toxic metabolic encephalopathy due to alcohol withdrawal  -Improving but not resolved     DVT Prophylaxis: Defer to primary service  Code Status: Full Code  Dispo: Likely in 1 to 2 days.  PT recommending TCU but will need to be sitter free for 24 hours first.         Interval History:      The patient does not report any significant events overnight.  He mentions that he is still having a little bit of soreness and achiness when getting up with physical therapy.  He is alert and oriented to person, place, time, and reason for being in the hospital. But it does take him some time to figure out where he is and he frequently looks outside for hints.                  Physical Exam:      Last Vital Signs:  /61 (BP Location: Left arm)   Pulse 73   Temp 98  F (36.7  C) (Temporal)   Resp 16   Ht 1.778 m (5' 10\")   Wt 83.5 kg (184 lb)   SpO2 96%   BMI 26.40 kg/m      Intake/Output Summary (Last 24 hours) at 9/29/2021 2301  Last data filed at 9/29/2021 2132  Gross per 24 hour   Intake 503 ml   Output --   Net 503 ml       GENERAL:  Comfortable. Cooperative.  Sitting in a chair.  PSYCH: pleasant, oriented to person, year, month, date, and reason for being in the hospital. No acute distress.  EYES: PERRLA, Normal conjunctiva.  HEART:  RRR. No JVD. No peripheral edema.  LUNGS:  Clear to auscultation, normal respiratory effort.  ABDOMEN:  Soft, no hepatosplenomegaly, normal bowel sounds.  EXTREMETIES: No clubbing, cyanosis or ischemia  SKIN:  Dry to touch, No rash.           Medications:      All current medications were reviewed.         Data:      All new lab and imaging data was reviewed.   Labs:       Lab Results   Component Value Date    "  09/27/2021     09/26/2021     09/25/2021    .0 07/15/2021    .8 04/26/2021    .6 04/12/2021    Lab Results   Component Value Date    CHLORIDE 107 09/27/2021    CHLORIDE 106 09/26/2021    CHLORIDE 111 09/25/2021    CHLORIDE 104.6 07/15/2021    CHLORIDE 92.0 04/26/2021    CHLORIDE 92.9 04/12/2021    Lab Results   Component Value Date    BUN 10 09/27/2021    BUN 7 09/26/2021    BUN 6 09/25/2021    BUN 6 07/15/2021    BUN 6.3 07/15/2021    BUN 13 04/26/2021    BUN 8.1 04/26/2021    BUN 11 04/12/2021    BUN 8.0 04/12/2021      Lab Results   Component Value Date    POTASSIUM 3.7 09/29/2021    POTASSIUM 3.7 09/28/2021    POTASSIUM 3.7 09/27/2021    POTASSIUM 3.68 07/15/2021    POTASSIUM 3.55 04/26/2021    POTASSIUM 3.73 04/12/2021    Lab Results   Component Value Date    CO2 27 09/27/2021    CO2 28 09/26/2021    CO2 26 09/25/2021    CO2 30.4 07/15/2021    CO2 28.2 04/26/2021    CO2 28.6 04/12/2021    Lab Results   Component Value Date    CR 0.79 09/27/2021    CR 0.70 09/26/2021    CR 0.70 09/25/2021    CR 0.96 07/15/2021    CR 1.61 04/26/2021    CR 1.37 04/12/2021        Recent Labs   Lab 09/30/21  0810 09/27/21  0643 09/26/21  0613   WBC 4.1 5.1 5.9   HGB 10.8* 11.4* 11.7*   HCT 34.1* 35.5* 36.4*   * 110* 110*    137* 125*

## 2021-09-30 NOTE — PROGRESS NOTES
[facility]  Hospitalist Progress Note  Patient Name: Armen Phelps    MRN: [id number]  Provider: Jus Kenney MD  09/29/21             Assessment and Plan:      Armen Phelps is a 76 year old male who was admitted on 9/23/2021. Past medical history is significant for hypertension, hyperlipidemia, GERD, and alcohol dependence.  He was admitted for elective L1-2 posterior lumbar fusion with revision decompression and discectomy.  MedicineTeam consulted for comanagement.     Mr. Phelps is a daily alcohol user.  He went into severe withdrawal in the evening of 9/23-9/24.  Wife is unsure when he had last drink in discussion with her on 09/26.  He is on CIWA protocol.     At this point we are starting high-dose IV thiamine given that he has some ongoing encephalopathy with alcohol withdrawal.     Seems to be gradually improving.    Problem list:     #Alcohol use d/o with withdrawal, delirium: went into severe withdrawal on the evening of 9/23-9/24.  He has been placed on CIWA protocol with vitamin therapy.  Still has some ongoing disorientation to place and situation.  -Continue to monitor for withdrawal.  Ativan available as needed.  -My colleague discussed importance of abstaining from all alcohol moving forward with wife.    --Continue high-dose IV thiamine on 9/27 for encephalopathy.  --Has needed sitter due to impulsiveness.     #Status post L1-L2 fusion revision decompression and discectomy: Underwent surgery on 9/23.  Tolerated well.  -Postop pain management anticoagulation physical therapy per primary team  -Physical therapy consulted and recommending TCU     #Hypertension: Prior to admission patient was on lisinopril but this was discontinued due to renal insufficiency  -As needed hydralazine available.       #Thrombocytopenia: Suspect secondary to alcohol use. Monitor. No evidence of bleeding. Hgb stable.     #Hyperlipidemia: Continue statin     #Hypokalemia: Replacement  "protocol     #GERD  -Continue PPI     Toxic metabolic encephalopathy due to alcohol withdrawal  -Improving but not resolved     DVT Prophylaxis: Defer to primary service  Code Status: Full Code  Dispo: Likely in 1 to 2 days.  PT recommending TCU but will need to be sitter free for 24 hours first        Interval History:      No new problems.  Confusion and disorientation seems to be improving some. Denies pain or other problems.                   Physical Exam:      Last Vital Signs:  BP (!) 149/75   Pulse 85   Temp 98  F (36.7  C) (Temporal)   Resp 20   Ht 1.778 m (5' 10\")   Wt 83.5 kg (184 lb)   SpO2 99%   BMI 26.40 kg/m      Intake/Output Summary (Last 24 hours) at 9/29/2021 2301  Last data filed at 9/29/2021 2132  Gross per 24 hour   Intake 503 ml   Output --   Net 503 ml       GENERAL:  Comfortable. Cooperative.  PSYCH: pleasant, oriented to person, month and date; not able to say why hospitalized, No acute distress.  EYES: PERRLA, Normal conjunctiva.  HEART:  Regular rate and rhythm. No JVD. Pulses normal. No edema.  LUNGS:  Clear to auscultation, normal Respiratory effort.  ABDOMEN:  Soft, no hepatosplenomegaly, normal bowel sounds.  EXTREMETIES: No clubbing, cyanosis or ischemia  SKIN:  Dry to touch, No rash.           Medications:      All current medications were reviewed.         Data:      All new lab and imaging data was reviewed.   Labs:       Lab Results   Component Value Date     09/27/2021     09/26/2021     09/25/2021    .0 07/15/2021    .8 04/26/2021    .6 04/12/2021    Lab Results   Component Value Date    CHLORIDE 107 09/27/2021    CHLORIDE 106 09/26/2021    CHLORIDE 111 09/25/2021    CHLORIDE 104.6 07/15/2021    CHLORIDE 92.0 04/26/2021    CHLORIDE 92.9 04/12/2021    Lab Results   Component Value Date    BUN 10 09/27/2021    BUN 7 09/26/2021    BUN 6 09/25/2021    BUN 6 07/15/2021    BUN 6.3 07/15/2021    BUN 13 04/26/2021    BUN 8.1 04/26/2021    BUN " 11 04/12/2021    BUN 8.0 04/12/2021      Lab Results   Component Value Date    POTASSIUM 3.7 09/29/2021    POTASSIUM 3.7 09/28/2021    POTASSIUM 3.7 09/27/2021    POTASSIUM 3.68 07/15/2021    POTASSIUM 3.55 04/26/2021    POTASSIUM 3.73 04/12/2021    Lab Results   Component Value Date    CO2 27 09/27/2021    CO2 28 09/26/2021    CO2 26 09/25/2021    CO2 30.4 07/15/2021    CO2 28.2 04/26/2021    CO2 28.6 04/12/2021    Lab Results   Component Value Date    CR 0.79 09/27/2021    CR 0.70 09/26/2021    CR 0.70 09/25/2021    CR 0.96 07/15/2021    CR 1.61 04/26/2021    CR 1.37 04/12/2021        Recent Labs   Lab 09/27/21  0643 09/26/21  0613 09/25/21  0740   WBC 5.1 5.9  --    HGB 11.4* 11.7* 10.8*   HCT 35.5* 36.4*  --    * 110*  --    * 125*  --

## 2021-09-30 NOTE — PROGRESS NOTES
"Ortho Rounding Note    S: Pt in bed, confusion remains although improved from past days. Is able to verbalize that pain is controlled. Ambulated in the halls yesterday with PT and tolerated very well. Denies n/v/f/c, SOP, CP.     O:  Vital signs:   Blood pressure 118/61, pulse 73, temperature 98  F (36.7  C), temperature source Temporal, resp. rate 18, height 1.778 m (5' 10\"), weight 83.5 kg (184 lb), SpO2 96 %.  Estimated body mass index is 26.4 kg/m  as calculated from the following:    Height as of this encounter: 1.778 m (5' 10\").    Weight as of this encounter: 83.5 kg (184 lb).      Intake/Output Summary (Last 24 hours) at 9/30/2021 0715  Last data filed at 9/29/2021 2132  Gross per 24 hour   Intake 503 ml   Output --   Net 503 ml       Dressings c/d/i  Patient is able to move all 4 extremities without difficulty     A:  POD #7 s/p L1-L2 posterior instrumented fusion    P:  General: Patient making daily improved with regards to confusion. He still remains 1:1 observer. Orthopaedically he is doing very well.   Pain: PO  Act: up ad sowmya, with therapy  DVT: Mech only  ID: routine postop abx to be completed 24 hours after surgery  Dispo: Planned TCU. Requires 24hr absence from observer which he has not met yet.    At this time, will defer discharge due to ongoing medical issues. Please call should any orthopedic concerns arise.     Appreciate Medicine consult for medical management    Wilder Guy PA-C    "

## 2021-09-30 NOTE — PLAN OF CARE
Variance- CIWA 4. Forgetful and needing cues and reminders for ADLs and safety. Confusion to place and to details of the situation of being in the hospital for the past 1 week. Continues to have a 1:1 sitter attendant for safety.   Bed and chair alarm on. Able to answer easy questions. Recognizes wife. Able to make conversations however when detailed answers needed quite forgetful and thinks he is in [place].   Back dressing clean and dry. No numbness. Moves all extremities and strong. Food and fluids well. Voiding. BM today. Pain- taking tylenol for good control. Ambulated in hallway with 1 assist, GB, walker 200 feet.   Plan is TCU at discharge for safety/mobility.   Pleasant mood. Wife did take patients cell phone home this evening to charge.

## 2021-09-30 NOTE — PLAN OF CARE
Patient disoriented to place this shift. Aquacel dressing from L1-L2 fusion CDI, YFN at a 2, Plan is discharge to TCU when no sitter for 24 hours. Will continue to monitor.

## 2021-09-30 NOTE — PLAN OF CARE
Pt mentation improving throughout shift, at times oriented x 4, other times only oriented to self.  VSS, afebrile.  Pain managed with tylenol.  CMS intact, trace edema to BLE.  Aquacel to back CDI.  Up with A1, WW and gaitbelt ambulating in room and vazquez.  Voiding adequately.  Large BM this shift.  Tolerating regular diet.  CIWA 4 and 3.Pt impulsive, PSC at bedside, alarms on for safety.  Wife at bedside.  Pt discharge to TCU when able to be PSC free for 24 hours.  Will continue to monitor.

## 2021-10-01 ENCOUNTER — APPOINTMENT (OUTPATIENT)
Dept: PHYSICAL THERAPY | Facility: CLINIC | Age: 76
DRG: 459 | End: 2021-10-01
Attending: ORTHOPAEDIC SURGERY
Payer: COMMERCIAL

## 2021-10-01 PROCEDURE — 250N000011 HC RX IP 250 OP 636: Performed by: INTERNAL MEDICINE

## 2021-10-01 PROCEDURE — 97530 THERAPEUTIC ACTIVITIES: CPT | Mod: GP

## 2021-10-01 PROCEDURE — 120N000001 HC R&B MED SURG/OB

## 2021-10-01 PROCEDURE — 250N000013 HC RX MED GY IP 250 OP 250 PS 637: Performed by: INTERNAL MEDICINE

## 2021-10-01 PROCEDURE — 99232 SBSQ HOSP IP/OBS MODERATE 35: CPT | Performed by: STUDENT IN AN ORGANIZED HEALTH CARE EDUCATION/TRAINING PROGRAM

## 2021-10-01 PROCEDURE — 258N000003 HC RX IP 258 OP 636: Performed by: INTERNAL MEDICINE

## 2021-10-01 PROCEDURE — 250N000013 HC RX MED GY IP 250 OP 250 PS 637: Performed by: PHYSICIAN ASSISTANT

## 2021-10-01 PROCEDURE — 250N000013 HC RX MED GY IP 250 OP 250 PS 637: Performed by: STUDENT IN AN ORGANIZED HEALTH CARE EDUCATION/TRAINING PROGRAM

## 2021-10-01 RX ORDER — LISINOPRIL 5 MG/1
5 TABLET ORAL DAILY
Status: DISCONTINUED | OUTPATIENT
Start: 2021-10-01 | End: 2021-10-03

## 2021-10-01 RX ADMIN — TAMSULOSIN HYDROCHLORIDE 0.4 MG: 0.4 CAPSULE ORAL at 09:08

## 2021-10-01 RX ADMIN — SENNOSIDES AND DOCUSATE SODIUM 1 TABLET: 50; 8.6 TABLET ORAL at 09:09

## 2021-10-01 RX ADMIN — THIAMINE HYDROCHLORIDE 250 MG: 100 INJECTION, SOLUTION INTRAMUSCULAR; INTRAVENOUS at 09:15

## 2021-10-01 RX ADMIN — Medication 25 MCG: at 20:21

## 2021-10-01 RX ADMIN — Medication 1 TABLET: at 09:08

## 2021-10-01 RX ADMIN — POLYETHYLENE GLYCOL 3350 17 G: 17 POWDER, FOR SOLUTION ORAL at 09:08

## 2021-10-01 RX ADMIN — CLONIDINE HYDROCHLORIDE 0.1 MG: 0.1 TABLET ORAL at 00:47

## 2021-10-01 RX ADMIN — Medication 1 TABLET: at 11:42

## 2021-10-01 RX ADMIN — ACETAMINOPHEN 650 MG: 325 TABLET, FILM COATED ORAL at 21:53

## 2021-10-01 RX ADMIN — MULTIPLE VITAMINS W/ MINERALS TAB 1 TABLET: TAB at 09:08

## 2021-10-01 RX ADMIN — LISINOPRIL 5 MG: 5 TABLET ORAL at 09:08

## 2021-10-01 RX ADMIN — QUETIAPINE FUMARATE 25 MG: 25 TABLET ORAL at 21:54

## 2021-10-01 RX ADMIN — PANTOPRAZOLE SODIUM 40 MG: 40 TABLET, DELAYED RELEASE ORAL at 20:21

## 2021-10-01 RX ADMIN — SENNOSIDES AND DOCUSATE SODIUM 1 TABLET: 50; 8.6 TABLET ORAL at 20:21

## 2021-10-01 RX ADMIN — Medication 5 MG: at 21:54

## 2021-10-01 RX ADMIN — CLONIDINE HYDROCHLORIDE 0.1 MG: 0.1 TABLET ORAL at 09:08

## 2021-10-01 RX ADMIN — Medication 1 TABLET: at 16:49

## 2021-10-01 RX ADMIN — FOLIC ACID 1 MG: 1 TABLET ORAL at 09:09

## 2021-10-01 RX ADMIN — Medication 25 MCG: at 09:09

## 2021-10-01 ASSESSMENT — ACTIVITIES OF DAILY LIVING (ADL)
ADLS_ACUITY_SCORE: 13
ADLS_ACUITY_SCORE: 11
ADLS_ACUITY_SCORE: 13

## 2021-10-01 NOTE — PROGRESS NOTES
Pt intermittently confused, sitter by bed side, up with assist of one walker and gait.CIWA-2, Mild tremors. Tolerating regular diet passing gas. Denies pain. Dressing is clean dry and intact. Cms intact, good dorci- plantar.Vodidng adequate amounts, plan at tcu at discharge. Will continue to monitor.

## 2021-10-01 NOTE — PLAN OF CARE
OT: Orders received. Chart reviewed and discussed with care team.  Spoke with PT who reports OT not indicated as pt with planned discharge to TCU due to safety concern and poor safety awareness.  Defer discharge recommendations to PT/care team.  Will complete IP OT orders.

## 2021-10-01 NOTE — PROGRESS NOTES
SMITH spoke with wife Cam on the phone. She was wondering what the status of the referrals were. SMITH explained that no referrals have been sent due to patient being on a 1:1. SMITH explained the process. Cam said she will be in tomorrow and SMITH will go over the TCU planning guide. Cam has a trip scheduled Oct 7-15. She said she also has POA paperwork. SMITH requested she bring it in and I will make a copy.         MACKENZIE Gant [facility]  [phone]  [address]   [facility]. [id number]  [facility]

## 2021-10-01 NOTE — PLAN OF CARE
Pt disorientated to place and situation. He is impulsive and gets up from chair and bed. PSC at bedside. Wife was at bedside for a couple hours. VSS afebrile. CIWA 2 and now CIWA checks are discontinued now Pt denies pain. Up with SBA gait belt and walker. Tolerated chair and walk in vazquez X2. LS clear. Tolerated regular diet. Denies nausea. Dressing c/d/I. Cms intact. Potassium protocol 4.1 recheck for tomorrow am. IV flomax. Voiding adequate amounts. BM today. TCU at discharge but has to be sitter free. Will continue to monitor    1530 page to Dr. Carcamo/Vamsi  Would it be ok to change IV thiamine to PO? Would you like to discontinue RN Potassium protocol. It was not drawn today and should have been per protocol. thank you

## 2021-10-01 NOTE — PROGRESS NOTES
[facility]  Hospitalist Progress Note  Patient Name: Armen Phelps    MRN: [id number]  Provider: Baldev Carcamo,   10/01/21             Assessment and Plan:      Armen Phelps is a 76 year old male who was admitted on 9/23/2021. Past medical history is significant for hypertension, hyperlipidemia, GERD, and alcohol dependence.  He was admitted for elective L1-2 posterior lumbar fusion with revision decompression and discectomy.  MedicineTeam consulted for comanagement.     Mr. Phelps is a daily alcohol user.  He went into severe withdrawal in the evening of 9/23-9/24. This seems to have improved.  Wife is unsure when he had last drink. He is on CIWA protocol but has not required any ativan for multiple days.     He has been started on high-dose IV thiamine given that he has some ongoing encephalopathy with alcohol withdrawal. This also seems to have improved, however, he still is disoriented to place and situation.     Discharge pending ability to be without sitter for 24 hours.    Assessment & Plan:  Alcohol use d/o with withdrawal, delirium: Severe withdrawal started on the evening of 9/23-9/24. He was placed on CIWA protocol with vitamin therapy but has not required ativan for multiple days now. Still has some ongoing disorientation to place and situation. Unfortunately, attempts without sitter have not been successful thus far.   - Discontinue CIWA with ativan  - Pharmacy consulted 10/1 for medication review, only meds that may be contributing to delirium are ativan and hydroxyzine, both of which he has not received for multiple days  - Previous discussions have been had regarding the importance of abstaining from all alcohol moving forward with wife.    - Continue high-dose IV thiamine for encephalopathy.   - Has had a sitter for impulsiveness - will try without this today 10/1.   - Discontinue clonidine  - I discussed plan with wife today and she agrees with need for TCU once there is no longer  "a need for a sitter. Her questions were answered.     Status post L1-L2 fusion revision decompression and discectomy: Underwent surgery on 9/23.  Tolerated well.  - Postop pain management, anticoagulation, and physical therapy per primary surgical team  - Physical therapy consulted and recommending TCU    Hypertension: Prior to admission patient was on lisinopril but this was discontinued due to renal insufficiency  - Resume lisinopril at lower dose (5mg) as renal function appears normal.   - As needed hydralazine available only for SBP >180     Thrombocytopenia, resolved: Suspect secondary to alcohol use. Monitor. No evidence of bleeding. Hgb stable.     Hyperlipidemia: Continue statin     Hypokalemia, resolved: Replacement protocol     GERD: Continue PPI     Toxic metabolic encephalopathy due to alcohol withdrawal  -Improving but not resolved     DVT Prophylaxis: Defer to primary service  Code Status: Full Code  Dispo: Likely in 1 to 2 days.  PT recommending TCU but will need to be sitter free for 24 hours first.         Interval History:      Patient continues to feel well.  He does have ongoing soreness in his back and hips.  Reports that ambulation urination and defecation have been normal.  He continues to be mildly confused.  He thinks he is in a school.  But he is oriented to person, date of birth, year, month, and day of the week.  Unfortunately he required ongoing sitter overnight due to impulsiveness.    Talked with wife at bedside this afternoon.                  Physical Exam:      Last Vital Signs:  /68   Pulse 81   Temp 98.1  F (36.7  C) (Temporal)   Resp 16   Ht 1.778 m (5' 10\")   Wt 83.5 kg (184 lb)   SpO2 98%   BMI 26.40 kg/m      Intake/Output Summary (Last 24 hours) at 9/29/2021 2301  Last data filed at 9/29/2021 2132  Gross per 24 hour   Intake 503 ml   Output --   Net 503 ml       GENERAL:  Comfortable. Cooperative.  Sitting in a chair. Dressed well.  PSYCH: pleasant, oriented to " person, year, month, date, and reason for being in the hospital. But he thinks that he is in a school. No acute distress.  EYES: PERRLA, Normal conjunctiva.  HEART:  RRR. No JVD. No peripheral edema.  LUNGS:  Clear to auscultation, normal respiratory effort.  ABDOMEN:  Soft, no hepatosplenomegaly, normal bowel sounds.  EXTREMETIES: No clubbing, cyanosis or ischemia  SKIN:  Dry to touch, No rash. Midline scar along lumbar spine is covered by a dressing that is CDI.           Medications:      All current medications were reviewed.         Data:      All new lab and imaging data was reviewed.   Labs:       Lab Results   Component Value Date     09/27/2021     09/26/2021     09/25/2021    .0 07/15/2021    .8 04/26/2021    .6 04/12/2021    Lab Results   Component Value Date    CHLORIDE 107 09/27/2021    CHLORIDE 106 09/26/2021    CHLORIDE 111 09/25/2021    CHLORIDE 104.6 07/15/2021    CHLORIDE 92.0 04/26/2021    CHLORIDE 92.9 04/12/2021    Lab Results   Component Value Date    BUN 10 09/27/2021    BUN 7 09/26/2021    BUN 6 09/25/2021    BUN 6 07/15/2021    BUN 6.3 07/15/2021    BUN 13 04/26/2021    BUN 8.1 04/26/2021    BUN 11 04/12/2021    BUN 8.0 04/12/2021      Lab Results   Component Value Date    POTASSIUM 3.7 09/29/2021    POTASSIUM 3.7 09/28/2021    POTASSIUM 3.7 09/27/2021    POTASSIUM 3.68 07/15/2021    POTASSIUM 3.55 04/26/2021    POTASSIUM 3.73 04/12/2021    Lab Results   Component Value Date    CO2 27 09/27/2021    CO2 28 09/26/2021    CO2 26 09/25/2021    CO2 30.4 07/15/2021    CO2 28.2 04/26/2021    CO2 28.6 04/12/2021    Lab Results   Component Value Date    CR 0.79 09/27/2021    CR 0.70 09/26/2021    CR 0.70 09/25/2021    CR 0.96 07/15/2021    CR 1.61 04/26/2021    CR 1.37 04/12/2021        Recent Labs   Lab 09/30/21  0810 09/27/21  0643 09/26/21  0613   WBC 4.1 5.1 5.9   HGB 10.8* 11.4* 11.7*   HCT 34.1* 35.5* 36.4*   * 110* 110*    137* 125*       Baldev Carcamo, DO

## 2021-10-02 PROCEDURE — 82607 VITAMIN B-12: CPT | Performed by: PSYCHIATRY & NEUROLOGY

## 2021-10-02 PROCEDURE — 120N000001 HC R&B MED SURG/OB

## 2021-10-02 PROCEDURE — 36415 COLL VENOUS BLD VENIPUNCTURE: CPT | Performed by: PSYCHIATRY & NEUROLOGY

## 2021-10-02 PROCEDURE — 250N000011 HC RX IP 250 OP 636: Performed by: INTERNAL MEDICINE

## 2021-10-02 PROCEDURE — 250N000013 HC RX MED GY IP 250 OP 250 PS 637: Performed by: PSYCHIATRY & NEUROLOGY

## 2021-10-02 PROCEDURE — 258N000003 HC RX IP 258 OP 636: Performed by: INTERNAL MEDICINE

## 2021-10-02 PROCEDURE — 99232 SBSQ HOSP IP/OBS MODERATE 35: CPT | Performed by: STUDENT IN AN ORGANIZED HEALTH CARE EDUCATION/TRAINING PROGRAM

## 2021-10-02 PROCEDURE — 250N000013 HC RX MED GY IP 250 OP 250 PS 637: Performed by: INTERNAL MEDICINE

## 2021-10-02 PROCEDURE — 250N000013 HC RX MED GY IP 250 OP 250 PS 637: Performed by: PHYSICIAN ASSISTANT

## 2021-10-02 PROCEDURE — 82746 ASSAY OF FOLIC ACID SERUM: CPT | Performed by: PSYCHIATRY & NEUROLOGY

## 2021-10-02 PROCEDURE — 250N000013 HC RX MED GY IP 250 OP 250 PS 637: Performed by: STUDENT IN AN ORGANIZED HEALTH CARE EDUCATION/TRAINING PROGRAM

## 2021-10-02 RX ORDER — HALOPERIDOL 5 MG/ML
2 INJECTION INTRAMUSCULAR EVERY 6 HOURS PRN
Status: DISCONTINUED | OUTPATIENT
Start: 2021-10-02 | End: 2021-10-08 | Stop reason: HOSPADM

## 2021-10-02 RX ORDER — QUETIAPINE FUMARATE 25 MG/1
25 TABLET, FILM COATED ORAL EVERY EVENING
Status: DISCONTINUED | OUTPATIENT
Start: 2021-10-02 | End: 2021-10-08 | Stop reason: HOSPADM

## 2021-10-02 RX ORDER — DONEPEZIL HYDROCHLORIDE 5 MG/1
5 TABLET, FILM COATED ORAL AT BEDTIME
Status: DISCONTINUED | OUTPATIENT
Start: 2021-10-02 | End: 2021-10-08 | Stop reason: HOSPADM

## 2021-10-02 RX ORDER — QUETIAPINE FUMARATE 25 MG/1
25 TABLET, FILM COATED ORAL 2 TIMES DAILY PRN
Status: DISCONTINUED | OUTPATIENT
Start: 2021-10-02 | End: 2021-10-08 | Stop reason: HOSPADM

## 2021-10-02 RX ADMIN — MULTIPLE VITAMINS W/ MINERALS TAB 1 TABLET: TAB at 08:04

## 2021-10-02 RX ADMIN — LISINOPRIL 5 MG: 5 TABLET ORAL at 08:04

## 2021-10-02 RX ADMIN — ACETAMINOPHEN 650 MG: 325 TABLET, FILM COATED ORAL at 04:07

## 2021-10-02 RX ADMIN — Medication 1 TABLET: at 16:33

## 2021-10-02 RX ADMIN — Medication 25 MCG: at 08:04

## 2021-10-02 RX ADMIN — SENNOSIDES AND DOCUSATE SODIUM 1 TABLET: 50; 8.6 TABLET ORAL at 19:32

## 2021-10-02 RX ADMIN — THIAMINE HYDROCHLORIDE 250 MG: 100 INJECTION, SOLUTION INTRAMUSCULAR; INTRAVENOUS at 08:05

## 2021-10-02 RX ADMIN — FOLIC ACID 1 MG: 1 TABLET ORAL at 08:04

## 2021-10-02 RX ADMIN — QUETIAPINE FUMARATE 25 MG: 25 TABLET ORAL at 19:32

## 2021-10-02 RX ADMIN — Medication 5 MG: at 21:35

## 2021-10-02 RX ADMIN — DONEPEZIL HYDROCHLORIDE 5 MG: 5 TABLET ORAL at 21:35

## 2021-10-02 RX ADMIN — Medication 25 MCG: at 19:32

## 2021-10-02 RX ADMIN — Medication 1 TABLET: at 08:05

## 2021-10-02 RX ADMIN — ACETAMINOPHEN 650 MG: 325 TABLET, FILM COATED ORAL at 16:33

## 2021-10-02 RX ADMIN — ACETAMINOPHEN 650 MG: 325 TABLET, FILM COATED ORAL at 22:11

## 2021-10-02 RX ADMIN — SENNOSIDES AND DOCUSATE SODIUM 1 TABLET: 50; 8.6 TABLET ORAL at 08:04

## 2021-10-02 RX ADMIN — Medication 1 TABLET: at 13:15

## 2021-10-02 RX ADMIN — TAMSULOSIN HYDROCHLORIDE 0.4 MG: 0.4 CAPSULE ORAL at 08:04

## 2021-10-02 RX ADMIN — HYDROXYZINE HYDROCHLORIDE 10 MG: 10 TABLET, FILM COATED ORAL at 22:11

## 2021-10-02 RX ADMIN — PANTOPRAZOLE SODIUM 40 MG: 40 TABLET, DELAYED RELEASE ORAL at 19:32

## 2021-10-02 RX ADMIN — QUETIAPINE FUMARATE 25 MG: 25 TABLET ORAL at 22:11

## 2021-10-02 ASSESSMENT — ACTIVITIES OF DAILY LIVING (ADL)
ADLS_ACUITY_SCORE: 11
ADLS_ACUITY_SCORE: 9
ADLS_ACUITY_SCORE: 11
ADLS_ACUITY_SCORE: 11
ADLS_ACUITY_SCORE: 9
ADLS_ACUITY_SCORE: 9

## 2021-10-02 NOTE — PLAN OF CARE
[id number]     Pt disoriented to place and situation. Impulsive and gets up from chair/bed. Sitter at bedside. VSS, afebrile. CIWA checks discontinued. Pt c/o pain to lower back, tylenol given. Up Ax1 with walker + gb. Tolerating regular diet. Denies nausea. Back dressing CDI. CMS intact. Potassium protocol, recheck ordered for tomorrow AM. Voiding in bathroom. Plan is to be sitter free, then discharge to TCU.

## 2021-10-02 NOTE — PROGRESS NOTES
MD paged @[phone]  Pt is experiencing visual disturbances: hallucinations- seeing red rots and spots, seems to be confused according to the bedside sitter.

## 2021-10-02 NOTE — CONSULTS
Neurology Consult Note  [facility]       [2021]                                                                                       Admission Date: 2021  Hospital Day: 10      Patient: Armen Phelps      : 1945  MRN:  [id number]     CC:    No chief complaint on file.      Consult Request:  Referring Provider:  Lenny Lindsay MD  Primary Care Provider:  Gerald Diggs MD        HPI:  Armen Phelps is a 76 year old yo male admitted on 21 for elective L1-2 fusion and discectomy. He had severe alcohol withdrawal after the surgery. He continues to be forgetful with disorientation to his surroundings.  He c/o mild forgetfulness for last 7-8 years. His wife has noticed cognitive deficits before the surgery.  He had an MRI brain 10/20 that showed atrophy and SVID.          A complete review of symptoms was performed including vascular, infectious, cardiovascular, pulmonary, gastrointestinal, endocrinological, hematologic, dermatologic, musculoskeletal, and neurological. All were normal except as above.    PAST MEDICAL HISTORY:  ALLERGIES:   Allergies   Allergen Reactions     Oxycodone Other (See Comments)     2021 oxycodone post op patient more confused, agitated, and angry after dose given on 2 occassions     Adhesive Tape Hives and Rash     Developed open sores after last spine surgery.     Tobacco:    History   Smoking Status     Former Smoker   Smokeless Tobacco     Never Used     Alcohol:  Social History    Substance and Sexual Activity      Alcohol use: Yes        Comment: daily 2 wine    MEDICATIONS:       CURRENTLY SCHEDULED MEDICATIONS     calcium carbonate-vitamin D  1 tablet Oral TID AC     folic acid  1 mg Oral Daily     lisinopril  5 mg Oral Daily     melatonin  5 mg Oral At Bedtime     multivitamin w/minerals  1 tablet Oral Daily     pantoprazole  40 mg Oral QPM     polyethylene glycol  17 g Oral Daily     QUEtiapine  25 mg Oral At Bedtime      senna-docusate  1 tablet Oral BID     sodium chloride (PF)  3 mL Intracatheter Q8H     tamsulosin  0.4 mg Oral Daily     thiamine  250 mg Intravenous Daily    Followed by     [START ON 10/4/2021] thiamine  100 mg Oral Daily     cholecalciferol  25 mcg Oral BID          HOME MEDICATIONS  Medications Prior to Admission   Medication Sig Dispense Refill Last Dose     acetaminophen (TYLENOL) 325 MG tablet Take 325-650 mg by mouth every 6 hours as needed for mild pain   9/22/2021 at Unknown time     cyanocobalamin (CYANOCOBALAMIN) 1000 MCG/ML injection Inject 1 mL (1,000 mcg) into the muscle every 21 days   9/2/2021     Cyanocobalamin (VITAMIN B 12 PO) Take 2,500 mcg by mouth daily   9/22/2021 at Unknown time     multivitamin w/minerals (MULTI-VITAMIN) tablet Take 2 tablets by mouth daily    9/20/2021 at Unknown time     ondansetron (ZOFRAN-ODT) 4 MG ODT tab Take 1 tablet (4 mg) by mouth every 8 hours as needed for nausea 90 tablet 3 More than a month at Unknown time     pantoprazole (PROTONIX) 40 MG EC tablet Take 1 tablet (40 mg) by mouth every evening 90 tablet 1 9/22/2021 at Unknown time     Probiotic Product (PROBIOTIC-10 PO) Take 1 capsule by mouth daily    9/22/2021 at Unknown time     QUEtiapine (SEROQUEL) 25 MG tablet Take 1 tablet (25 mg) by mouth At Bedtime 90 tablet 1 9/22/2021 at Unknown time     simvastatin (ZOCOR) 80 MG tablet Take 1 tablet (80 mg) by mouth every evening (Patient taking differently: Take 40 mg by mouth every evening Takes 1/2 of a 80 mg) 90 tablet 1 9/22/2021 at Unknown time     testosterone cypionate (DEPOTESTOSTERONE) 100 MG/ML injection Inject 50 mg into the muscle every 21 days Last dose 9/9/21 9/2/2021 at Unknown time     [DISCONTINUED] Vitamin D3 (CHOLECALCIFEROL) 125 MCG (5000 UT) tablet Take 125 mcg by mouth daily   9/22/2021 at Unknown time     MEDICAL HISTORY  Past Medical History:   Diagnosis Date     GERD (gastroesophageal reflux disease)      Hyperlipidemia       Hypertension      Other chronic pain     LOW BACK     Spinal stenosis of lumbar region      SURGICAL HISTORY  Past Surgical History:   Procedure Laterality Date     BILATERAL LUMBAR LAMINECTOMY L2-3 and L3-4 WITH LEFT DISCECTOMY AND EXCISION OF LEFT CYST AT L5-S1  12/2013     CHOLECYSTECTOMY  1997     OPTICAL TRACKING SYSTEM FUSION POSTERIOR SPINE LUMBAR N/A 9/23/2021    Procedure: L1 to L2 posterior fusion. Posterior instrumentation using a bilateral pedicle screw and pily construct spanning from L1 to L2 for fusion purposes. Revision decompression L1. Revision decompression L2. L1 to L2 diskectomy, following a left-sided partial facetectomy;  Surgeon: Lenny Lindsay MD;  Location: [facility] OR     FAMILY HISTORY    History reviewed. No pertinent family history.  SOCIAL HISTORY  Social History     Socioeconomic History     Marital status:      Spouse name: None     Number of children: None     Years of education: None     Highest education level: None   Occupational History     None   Tobacco Use     Smoking status: Former Smoker     Smokeless tobacco: Never Used   Substance and Sexual Activity     Alcohol use: Yes     Comment: daily 2 wine     Drug use: No     Sexual activity: None   Other Topics Concern     None   Social History Narrative     None     Social Determinants of Health     Financial Resource Strain:      Difficulty of Paying Living Expenses:    Food Insecurity:      Worried About Running Out of Food in the Last Year:      Ran Out of Food in the Last Year:    Transportation Needs:      Lack of Transportation (Medical):      Lack of Transportation (Non-Medical):    Physical Activity:      Days of Exercise per Week:      Minutes of Exercise per Session:    Stress:      Feeling of Stress :    Social Connections:      Frequency of Communication with Friends and Family:      Frequency of Social Gatherings with Friends and Family:      Attends Denominational Services:      Active Member of Clubs or Organizations:   "    Attends Club or Organization Meetings:      Marital Status:    Intimate Partner Violence:      Fear of Current or Ex-Partner:      Emotionally Abused:      Physically Abused:      Sexually Abused:             Height: 177.8 cm (5' 10\")     Temp: 98  F (36.7  C)   Weight: 83.5 kg (184 lb)    Temp src: Temporal         BP: 131/70         Estimated body mass index is 26.4 kg/m  as calculated from the following:    Height as of this encounter: 1.778 m (5' 10\").    Weight as of this encounter: 83.5 kg (184 lb).    Resp: 16   SpO2: 99 %   O2 Device: None (Room air)     Blood Pressure:   BP Readings from Last 3 Encounters:   10/02/21 131/70   07/15/21 120/72   21 (!) 136/90     T24 : Temp (24hrs), Av  F (36.7  C), Min:97.7  F (36.5  C), Max:98.4  F (36.9  C)       GENERAL EXAMINATION:  HEENT: PERRLA, EOMI.  Neck: Supple, No carotid bruits. No myofascial tender points.  Chest: Bilateral air entry present.  Heart: S1, S2 RRR.    NEUROLOGICAL EXAMINATION  Mental Status:  Patient is awake, alert, but disoriented to place. MOCA- 20/30.  He has significant short term memory deficit.    Cranial Nerves: Pupils are equal and reactive to light.Visual fields are full to confrontation. Extraocular movements are intact. There is no nystagmus in the horizontal or vertical planes.No facial sensory deficits. No facial weakness. The tongue is midline.     Motor: Muscle tone is normal. There is no pronator drift. There is no muscle atrophy. The strength is 5/5 in upper and lower extremities bilaterally. D       Coordination: .Finger to nose -normal.      .  LABORATORY RESULTS      Recent Labs   Lab 21  0810 21  0643 21  0613   WBC 4.1 5.1 5.9   HGB 10.8* 11.4* 11.7*   HCT 34.1* 35.5* 36.4*   * 110* 110*    137* 125*     Recent Labs   Lab 21  0810 21  0643 21  0809 21  0643 21  0643 21  1337 21  0613     --   --   --  140  --  140   POTASSIUM 4.1 3.7 " 3.7   < > 3.7   < > 3.2*   CHLORIDE 106  --   --   --  107  --  106   CO2 27  --   --   --  27  --  28   ANIONGAP 5  --   --   --  6  --  6   GLC 97  --   --   --  100*  --  99   BUN 8  --   --   --  10  --  7   CR 0.80  --   --   --  0.79  --  0.70   GFRESTIMATED 87  --   --   --  87  --  >90   DESTINEE 9.0  --   --   --  8.7  --  8.7   MAG  --   --   --   --  2.0  --  1.7   PROTTOTAL  --   --   --   --   --   --  6.2*   ALBUMIN  --   --   --   --   --   --  2.9*   BILITOTAL  --   --   --   --   --   --  1.0   ALKPHOS  --   --   --   --   --   --  49   AST  --   --   --   --   --   --  23   ALT  --   --   --   --   --   --  16    < > = values in this interval not displayed.     No results for input(s): TSH in the last 168 hours.  No results for input(s): COLOR, APPEARANCE, URINEGLC, URINEBILI, URINEKETONE, SG, UBLD, URINEPH, PROTEIN, UROBILINOGEN, NITRITE, LEUKEST, RBCU, WBCU in the last 168 hours.    IMAGING RESULTS     No results found for this or any previous visit (from the past 24 hour(s)).    _____________________________________________________________________________    _____________________________________________________________________________          ASSESSMENT     Cognitive deficits- Patient has mild to moderate cognitive deficits. 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  Check B12, folate, TSH, T4.        RECOMMENDATIONS   1. Start him on Aricept 5 mg PO every day.  2. F/U in clinic in 4 weeks

## 2021-10-02 NOTE — CONSULTS
Care Management Initial Consult    General Information  Assessment completed with: Spouse or significant other,    Type of CM/SW Visit: Initial Assessment    Primary Care Provider verified and updated as needed: Yes   Readmission within the last 30 days:        Reason for Consult: housing concerns/homeless, discharge planning  Advance Care Planning:            Communication Assessment  Patient's communication style: spoken language (English or Bilingual)    Hearing Difficulty or Deaf: yes   Wear Glasses or Blind: yes    Cognitive  Cognitive/Neuro/Behavioral: .WDL except  Level of Consciousness: confused  Arousal Level: opens eyes spontaneously  Orientation: disoriented to, time, situation  Mood/Behavior: cooperative, calm  Best Language: 0 - No aphasia  Speech: clear, spontaneous    Living Environment:   People in home: spouse   (Cam)  Current living Arrangements: house      Able to return to prior arrangements: yes       Family/Social Support:  Care provided by: spouse/significant other, self  Provides care for: no one, unable/limited ability to care for self  Marital Status:   Wife   (Cam)       Description of Support System: Supportive, Involved    Support Assessment: Adequate family and caregiver support, Adequate social supports    Current Resources:   Patient receiving home care services: No     Community Resources: Skilled Nursing Facility  Equipment currently used at home: walker, rolling  Supplies currently used at home:      Employment/Financial:  Employment Status:          Financial Concerns: insurance, none   Referral to Financial Counselor: No       Lifestyle & Psychosocial Needs:  Social Determinants of Health     Tobacco Use: Medium Risk     Smoking Tobacco Use: Former Smoker     Smokeless Tobacco Use: Never Used   Alcohol Use: Unknown     Frequency of Alcohol Consumption: 4 or more times a week     Average Number of Drinks: 1 or 2     Frequency of Binge Drinking: Not on file   Financial  Resource Strain:      Difficulty of Paying Living Expenses:    Food Insecurity:      Worried About Running Out of Food in the Last Year:      Ran Out of Food in the Last Year:    Transportation Needs:      Lack of Transportation (Medical):      Lack of Transportation (Non-Medical):    Physical Activity:      Days of Exercise per Week:      Minutes of Exercise per Session:    Stress:      Feeling of Stress :    Social Connections:      Frequency of Communication with Friends and Family:      Frequency of Social Gatherings with Friends and Family:      Attends Hindu Services:      Active Member of Clubs or Organizations:      Attends Club or Organization Meetings:      Marital Status:    Intimate Partner Violence:      Fear of Current or Ex-Partner:      Emotionally Abused:      Physically Abused:      Sexually Abused:    Depression: Not at risk     PHQ-2 Score: 0   Housing Stability:      Unable to Pay for Housing in the Last Year:      Number of Places Lived in the Last Year:      Unstable Housing in the Last Year:        Functional Status:  Prior to admission patient needed assistance: Patient lives with spouse who helps with his daily living skills.         Mental Health Status:  Mental Health Status: Current Concern       Chemical Dependency Status:  Chemical Dependency Status: No Current Concerns             Values/Beliefs:  Spiritual, Cultural Beliefs, Hindu Practices, Values that affect care: no               Additional Information:  SW spoke with patients wife and patient. Patient still has a sitter. SW wont send referrals until appropriate. Cam did say she has long term care insurance and is considering full time placement pending TCU trial.     They would like referrals sent to [facility], [facility], [facility], [facility], [facility] They would also like a private room. SW will send referrals when appropriate.      [facility].        MACKENZIE Gant [facility]  [phone]  [address]   [facility]. [id number]  [facility]            MACKENZIE Small

## 2021-10-02 NOTE — PROGRESS NOTES
Cross Cover:     Paged by nursing for worsening hallucinations. Patient admitted with alcohol use disorder with withdrawal delirium. Continue to be encephalopathic and impulsive. Sitter has been at bedside. This evening patient developed seeing spots and has increased confusion. Patient given tylenol for pain. Nursing unable to describe baseline confusion. Per chart review confusion seems to be at baseline for patient. Patient is redirectable and while confused and impulsive is calm and follows directions. Continue utilization of sitter at bedside. Haldol and Seroquel prn available if patient has progressively worsening agitation. If patient continues to have visual changes provider to be notified.

## 2021-10-02 NOTE — PLAN OF CARE
MD paged @[phone]  Pt is experiencing visual disturbances: hallucinations- seeing red rots and spots, seems to be confused according to the bedside sitter.  Pt's baseline is confusion secondary to hepatic encephalopathy due to alcohol disorder.     MD added Seroquel at HS and PRN and Haldol PRN for agitation and impulsiveness.   Alert, confused, reported pain at 0400, Tylenol given, CMS intact, pulses strong x4,

## 2021-10-02 NOTE — PROGRESS NOTES
[facility]  Hospitalist Progress Note  Patient Name: Armen Phelps    MRN: [id number]  Provider: Baldev Carcamo,   10/01/21             Assessment and Plan:      Armen Phelps is a 76 year old male who was admitted on 9/23/2021. Past medical history is significant for hypertension, hyperlipidemia, GERD, and alcohol dependence.  He was admitted for elective L1-2 posterior lumbar fusion with revision decompression and discectomy.  MedicineTeam consulted for comanagement.     Mr. Phelps is a daily alcohol user.  He went into severe withdrawal in the evening of 9/23-9/24. This seems to have improved.  Wife is unsure when he had last drink. He was initially on CIWA protocol but this has now been discontinued as he did not require any benzodiazepine administration.     He has been started on high-dose IV thiamine given that he has some ongoing encephalopathy. This also seems to have improved, however, he still is disoriented to situation and continues to be impulsive.     Discharge pending ability to be without sitter for 24 hours.  Plan for TCU.    Assessment & Plan:  Delirium  Alcohol use d/o with withdrawal: Severe withdrawal started on the evening of 9/23-9/24. He was placed on CIWA protocol with vitamin therapy but has not required ativan for many days now. Ongoing disorientation to situation. Unfortunately, attempts without sitter have not been successful thus far.  We will continue to redirect and reorient. Only meds that may be contributing to delirium are ativan and hydroxyzine, both of which he has not received for multiple days.   - Strict alcohol cessation moving forward - previously discussed   - Continue high-dose IV thiamine panel for encephalopathy  - Has had a sitter for impulsiveness - will try without this daily until discharge  - Wife agrees with need for TCU once there is no longer a need for a sitter.     Status post L1-L2 fusion revision decompression and discectomy: Underwent  "surgery on 9/23.  Tolerated well.  - Postop pain management, anticoagulation, and physical therapy per primary surgical team  - Physical therapy consulted and recommending TCU    Hypertension: Prior to admission patient was on lisinopril but this was discontinued due to renal insufficiency  - Lisinopril at lower dose (5mg) as renal function appears normal  - As needed hydralazine available only for SBP >180     Thrombocytopenia, resolved: Suspect secondary to alcohol use. Monitor. No evidence of bleeding. Hgb stable.     Hyperlipidemia: Continue statin     Hypokalemia, resolved: Replacement protocol     GERD: Continue PPI       DVT Prophylaxis: Defer to primary service  Code Status: Full Code  Dispo: Likely in 1 to 2 days.  PT recommending TCU but will need to be sitter free for 24 hours first.         Interval History:      There was report that the patient was experiencing hallucinations overnight.  When I talk with him today he denies this.  Denies any current hallucinations.  Not sure what I am talking about.  He is alert and oriented to person, time, and place, but does not know why he is in the hospital.     There is been ongoing redirection with regards to calling for a nurse when needing to get up.  However, the patient seems to forget this immediately after being told.  I talked to him through this again today.                  Physical Exam:      Last Vital Signs:  /70 (BP Location: Left arm)   Pulse 58   Temp 98  F (36.7  C) (Temporal)   Resp 16   Ht 1.778 m (5' 10\")   Wt 83.5 kg (184 lb)   SpO2 99%   BMI 26.40 kg/m      Intake/Output Summary (Last 24 hours) at 9/29/2021 2301  Last data filed at 9/29/2021 2132  Gross per 24 hour   Intake 503 ml   Output --   Net 503 ml       GENERAL:  Comfortable. Cooperative.  Sitting in a chair watching TV  PSYCH: pleasant, oriented to person, year, month, date, and place. Not oriented to situation. No acute distress.  EYES: PERRLA, Normal " conjunctiva.  HEART:  RRR. No JVD. No peripheral edema.  LUNGS:  Clear to auscultation, normal respiratory effort.  ABDOMEN:  Soft, no hepatosplenomegaly, normal bowel sounds.  EXTREMETIES: No clubbing, cyanosis or ischemia.  1+ pitting edema bilaterally.  SKIN:  Dry to touch, No rash. Midline scar along lumbar spine is covered by a dressing that is CDI.           Medications:      All current medications were reviewed.         Data:      All new lab and imaging data was reviewed.   Labs:       Lab Results   Component Value Date     09/27/2021     09/26/2021     09/25/2021    .0 07/15/2021    .8 04/26/2021    .6 04/12/2021    Lab Results   Component Value Date    CHLORIDE 107 09/27/2021    CHLORIDE 106 09/26/2021    CHLORIDE 111 09/25/2021    CHLORIDE 104.6 07/15/2021    CHLORIDE 92.0 04/26/2021    CHLORIDE 92.9 04/12/2021    Lab Results   Component Value Date    BUN 10 09/27/2021    BUN 7 09/26/2021    BUN 6 09/25/2021    BUN 6 07/15/2021    BUN 6.3 07/15/2021    BUN 13 04/26/2021    BUN 8.1 04/26/2021    BUN 11 04/12/2021    BUN 8.0 04/12/2021      Lab Results   Component Value Date    POTASSIUM 3.7 09/29/2021    POTASSIUM 3.7 09/28/2021    POTASSIUM 3.7 09/27/2021    POTASSIUM 3.68 07/15/2021    POTASSIUM 3.55 04/26/2021    POTASSIUM 3.73 04/12/2021    Lab Results   Component Value Date    CO2 27 09/27/2021    CO2 28 09/26/2021    CO2 26 09/25/2021    CO2 30.4 07/15/2021    CO2 28.2 04/26/2021    CO2 28.6 04/12/2021    Lab Results   Component Value Date    CR 0.79 09/27/2021    CR 0.70 09/26/2021    CR 0.70 09/25/2021    CR 0.96 07/15/2021    CR 1.61 04/26/2021    CR 1.37 04/12/2021        Recent Labs   Lab 09/30/21  0810 09/27/21  0643 09/26/21  0613   WBC 4.1 5.1 5.9   HGB 10.8* 11.4* 11.7*   HCT 34.1* 35.5* 36.4*   * 110* 110*    137* 125*      Baldev Carcamo DO

## 2021-10-03 LAB
ANION GAP SERPL CALCULATED.3IONS-SCNC: 5 MMOL/L (ref 3–14)
BUN SERPL-MCNC: 7 MG/DL (ref 7–30)
CALCIUM SERPL-MCNC: 9.3 MG/DL (ref 8.5–10.1)
CHLORIDE BLD-SCNC: 110 MMOL/L (ref 94–109)
CO2 SERPL-SCNC: 27 MMOL/L (ref 20–32)
CREAT SERPL-MCNC: 0.9 MG/DL (ref 0.66–1.25)
ERYTHROCYTE [DISTWIDTH] IN BLOOD BY AUTOMATED COUNT: 14.6 % (ref 10–15)
GFR SERPL CREATININE-BSD FRML MDRD: 83 ML/MIN/1.73M2
GLUCOSE BLD-MCNC: 92 MG/DL (ref 70–99)
HCT VFR BLD AUTO: 36.1 % (ref 40–53)
HGB BLD-MCNC: 11.5 G/DL (ref 13.3–17.7)
MCH RBC QN AUTO: 35.1 PG (ref 26.5–33)
MCHC RBC AUTO-ENTMCNC: 31.9 G/DL (ref 31.5–36.5)
MCV RBC AUTO: 110 FL (ref 78–100)
PLATELET # BLD AUTO: 211 10E3/UL (ref 150–450)
POTASSIUM BLD-SCNC: 3.9 MMOL/L (ref 3.4–5.3)
RBC # BLD AUTO: 3.28 10E6/UL (ref 4.4–5.9)
SODIUM SERPL-SCNC: 142 MMOL/L (ref 133–144)
TSH SERPL DL<=0.005 MIU/L-ACNC: 2.18 MU/L (ref 0.4–4)
WBC # BLD AUTO: 3.6 10E3/UL (ref 4–11)

## 2021-10-03 PROCEDURE — 250N000013 HC RX MED GY IP 250 OP 250 PS 637: Performed by: INTERNAL MEDICINE

## 2021-10-03 PROCEDURE — 36415 COLL VENOUS BLD VENIPUNCTURE: CPT | Performed by: STUDENT IN AN ORGANIZED HEALTH CARE EDUCATION/TRAINING PROGRAM

## 2021-10-03 PROCEDURE — 99232 SBSQ HOSP IP/OBS MODERATE 35: CPT | Performed by: STUDENT IN AN ORGANIZED HEALTH CARE EDUCATION/TRAINING PROGRAM

## 2021-10-03 PROCEDURE — 85027 COMPLETE CBC AUTOMATED: CPT | Performed by: STUDENT IN AN ORGANIZED HEALTH CARE EDUCATION/TRAINING PROGRAM

## 2021-10-03 PROCEDURE — 80048 BASIC METABOLIC PNL TOTAL CA: CPT | Performed by: STUDENT IN AN ORGANIZED HEALTH CARE EDUCATION/TRAINING PROGRAM

## 2021-10-03 PROCEDURE — 120N000001 HC R&B MED SURG/OB

## 2021-10-03 PROCEDURE — 250N000011 HC RX IP 250 OP 636: Performed by: INTERNAL MEDICINE

## 2021-10-03 PROCEDURE — 250N000013 HC RX MED GY IP 250 OP 250 PS 637: Performed by: STUDENT IN AN ORGANIZED HEALTH CARE EDUCATION/TRAINING PROGRAM

## 2021-10-03 PROCEDURE — 84443 ASSAY THYROID STIM HORMONE: CPT | Performed by: PSYCHIATRY & NEUROLOGY

## 2021-10-03 PROCEDURE — 250N000013 HC RX MED GY IP 250 OP 250 PS 637: Performed by: PHYSICIAN ASSISTANT

## 2021-10-03 PROCEDURE — 258N000003 HC RX IP 258 OP 636: Performed by: INTERNAL MEDICINE

## 2021-10-03 PROCEDURE — 250N000013 HC RX MED GY IP 250 OP 250 PS 637: Performed by: PSYCHIATRY & NEUROLOGY

## 2021-10-03 RX ORDER — LISINOPRIL 10 MG/1
10 TABLET ORAL DAILY
Status: DISCONTINUED | OUTPATIENT
Start: 2021-10-03 | End: 2021-10-08 | Stop reason: HOSPADM

## 2021-10-03 RX ADMIN — Medication 1 TABLET: at 12:26

## 2021-10-03 RX ADMIN — THIAMINE HYDROCHLORIDE 250 MG: 100 INJECTION, SOLUTION INTRAMUSCULAR; INTRAVENOUS at 08:12

## 2021-10-03 RX ADMIN — TAMSULOSIN HYDROCHLORIDE 0.4 MG: 0.4 CAPSULE ORAL at 08:13

## 2021-10-03 RX ADMIN — Medication 1 TABLET: at 16:33

## 2021-10-03 RX ADMIN — MULTIPLE VITAMINS W/ MINERALS TAB 1 TABLET: TAB at 08:12

## 2021-10-03 RX ADMIN — Medication 1 TABLET: at 08:13

## 2021-10-03 RX ADMIN — Medication 25 MCG: at 19:31

## 2021-10-03 RX ADMIN — QUETIAPINE FUMARATE 25 MG: 25 TABLET ORAL at 19:31

## 2021-10-03 RX ADMIN — POLYETHYLENE GLYCOL 3350 17 G: 17 POWDER, FOR SOLUTION ORAL at 08:12

## 2021-10-03 RX ADMIN — FOLIC ACID 1 MG: 1 TABLET ORAL at 08:13

## 2021-10-03 RX ADMIN — ACETAMINOPHEN 650 MG: 325 TABLET, FILM COATED ORAL at 16:33

## 2021-10-03 RX ADMIN — Medication 25 MCG: at 08:13

## 2021-10-03 RX ADMIN — LISINOPRIL 10 MG: 10 TABLET ORAL at 08:12

## 2021-10-03 RX ADMIN — Medication 5 MG: at 21:45

## 2021-10-03 RX ADMIN — SENNOSIDES AND DOCUSATE SODIUM 1 TABLET: 50; 8.6 TABLET ORAL at 08:12

## 2021-10-03 RX ADMIN — PANTOPRAZOLE SODIUM 40 MG: 40 TABLET, DELAYED RELEASE ORAL at 19:31

## 2021-10-03 RX ADMIN — DONEPEZIL HYDROCHLORIDE 5 MG: 5 TABLET ORAL at 21:45

## 2021-10-03 ASSESSMENT — ACTIVITIES OF DAILY LIVING (ADL)
ADLS_ACUITY_SCORE: 9
ADLS_ACUITY_SCORE: 13
ADLS_ACUITY_SCORE: 13
ADLS_ACUITY_SCORE: 11
ADLS_ACUITY_SCORE: 13
ADLS_ACUITY_SCORE: 9

## 2021-10-03 NOTE — PLAN OF CARE
[id number]     Alert, oriented to person, disoriented at times to place: once thought he was at  bar and requested a glass of priya. Asked for . Reoriented him by reminding him that he was at the hospital. Responds to reorientation very well. Served in [employer].    He knows what year it is, date, day of the week, his name, his , him having a surgery, CIWA- 0, did a good job using a call light whenever he needed to be up, reminded to stay in the bed until someone gets into his room. Stays compliant. Frequently up to the bathroom. Feels bad of pushing call light frequently, reminded him we are here for him and his safety is our top priority.  No sitter at bedside. Responds to reorienting and talking very well.   Brief, gown changed, linens changed, new chux applied.

## 2021-10-03 NOTE — PLAN OF CARE
Pt disorientated to situation. VSS afebrile, RA. Last day for IV Thiamine and will start PO form tomorrow. Up with SBA Gait belt and walker. Pt is impulsive at times and sets alarms off but has not needed a sitter today. LS clear. Cms intact. Dressing c/d/I. Denies SOB, nausea and pain. Tolerated regular diet. Wife at bedside part of the day. Plan is TCU referrals sent by . Neurologist would like to follow up out pt in 4 weeks. POC reviewed with wife and pt. Will continue to monitor.

## 2021-10-03 NOTE — PLAN OF CARE
"BP (!) 166/93 (BP Location: Right arm)   Pulse 94   Temp 98.8  F (37.1  C) (Temporal)   Resp 16   Ht 1.778 m (5' 10\")   Wt 83.5 kg (184 lb)   SpO2 99%   BMI 26.40 kg/m    Orientation: Alert to self, disoriented to place and situation and continues to be impulsive.  Resp: LS Clear, RA  Pain: Tylenol  Dressing: CDI  Activity: A1, walker/gb  Diet: tolerating regular  Voiding: spontaneously without difficulty  Discharge Plan: TBD     "

## 2021-10-03 NOTE — PROGRESS NOTES
[facility]  Hospitalist Progress Note  Patient Name: Armen Phelps    MRN: [id number]  Provider: Baldev Carcamo,   10/03/21             Assessment and Plan:      Armen Phelps is a 76 year old male who was admitted on 9/23/2021. Past medical history is significant for hypertension, hyperlipidemia, GERD, and alcohol dependence.  He was admitted for elective L1-2 posterior lumbar fusion with revision decompression and discectomy.  MedicineTeam consulted for comanagement.     Mr. Phelps is a daily alcohol user.  He went into severe withdrawal in the evening of 9/23-9/24. This seems to have improved.  Wife is unsure when he had last drink. He was initially on CIWA protocol but this has now been discontinued as he did not require any benzodiazepine administration.     He has been started on high-dose IV thiamine given that he has some ongoing encephalopathy. This also seems to have improved, however, he still is disoriented to situation and continues to be impulsive.     Has now been without sitter for 24 hours.  Work sending referrals for TCU.    Assessment & Plan:  Delirium  Alcohol use d/o with withdrawal: Severe withdrawal started on the evening of 9/23-9/24. He was placed on CIWA protocol with vitamin therapy but has not required ativan for many days now. Ongoing disorientation to situation. Unfortunately, attempts without sitter have not been successful thus far.  We will continue to redirect and reorient. Only meds that may be contributing to delirium are ativan and hydroxyzine, both of which he has not received for multiple days.   - Strict alcohol cessation moving forward - previously discussed   - Continue high-dose IV thiamine panel for encephalopathy  - Has had a sitter for impulsiveness - has been without sitter for 24 hours  - Touched base with social work who will send out referrals  - Wife agrees with need for TCU     Status post L1-L2 fusion revision decompression and  "discectomy: Underwent surgery on 9/23.  Tolerated well.  - Postop pain management, anticoagulation, and physical therapy per primary surgical team  - Physical therapy consulted and recommending TCU    Hypertension: Prior to admission patient was on lisinopril but this was discontinued due to renal insufficiency  - Lisinopril at lower dose (5mg) as renal function appears normal  - As needed hydralazine available only for SBP >180     Thrombocytopenia, resolved: Suspect secondary to alcohol use. Monitor. No evidence of bleeding. Hgb stable.     Hyperlipidemia: Continue statin     Hypokalemia, resolved: Replacement protocol     GERD: Continue PPI       DVT Prophylaxis: Defer to primary service  Code Status: Full Code  Dispo: Likely in 1 to 2 days.  Pending placement.        Interval History:      The patient seemed to be restless again overnight.  However he did not require a sitter and use the call inappropriately.  He denies any new complaints.  Is hoping to be able to discharge from the hospital soon.                  Physical Exam:      Last Vital Signs:  BP (!) 158/89 (BP Location: Right arm)   Pulse 73   Temp 98.4  F (36.9  C) (Temporal)   Resp 20   Ht 1.778 m (5' 10\")   Wt 83.5 kg (184 lb)   SpO2 99%   BMI 26.40 kg/m      Intake/Output Summary (Last 24 hours) at 9/29/2021 2301  Last data filed at 9/29/2021 2132  Gross per 24 hour   Intake 503 ml   Output --   Net 503 ml       GENERAL:  Comfortable. Cooperative.  Lying in bed taking his medications.  PSYCH: pleasant, oriented to person, year, month, date, and place.  Takes reminders to remember why he is in the hospital.. No acute distress.  EYES: PERRLA, Normal conjunctiva.  HEART:  RRR. No JVD. No peripheral edema.  LUNGS:  Clear to auscultation, normal respiratory effort.  ABDOMEN:  Soft, no hepatosplenomegaly, normal bowel sounds.  EXTREMETIES: No clubbing, cyanosis or ischemia.   SKIN:  Dry to touch, No rash. Midline scar along lumbar spine is covered " by a dressing that is CDI.           Medications:      All current medications were reviewed.         Data:      All new lab and imaging data was reviewed.   Labs:       Lab Results   Component Value Date     09/27/2021     09/26/2021     09/25/2021    .0 07/15/2021    .8 04/26/2021    .6 04/12/2021    Lab Results   Component Value Date    CHLORIDE 107 09/27/2021    CHLORIDE 106 09/26/2021    CHLORIDE 111 09/25/2021    CHLORIDE 104.6 07/15/2021    CHLORIDE 92.0 04/26/2021    CHLORIDE 92.9 04/12/2021    Lab Results   Component Value Date    BUN 10 09/27/2021    BUN 7 09/26/2021    BUN 6 09/25/2021    BUN 6 07/15/2021    BUN 6.3 07/15/2021    BUN 13 04/26/2021    BUN 8.1 04/26/2021    BUN 11 04/12/2021    BUN 8.0 04/12/2021      Lab Results   Component Value Date    POTASSIUM 3.7 09/29/2021    POTASSIUM 3.7 09/28/2021    POTASSIUM 3.7 09/27/2021    POTASSIUM 3.68 07/15/2021    POTASSIUM 3.55 04/26/2021    POTASSIUM 3.73 04/12/2021    Lab Results   Component Value Date    CO2 27 09/27/2021    CO2 28 09/26/2021    CO2 26 09/25/2021    CO2 30.4 07/15/2021    CO2 28.2 04/26/2021    CO2 28.6 04/12/2021    Lab Results   Component Value Date    CR 0.79 09/27/2021    CR 0.70 09/26/2021    CR 0.70 09/25/2021    CR 0.96 07/15/2021    CR 1.61 04/26/2021    CR 1.37 04/12/2021        Recent Labs   Lab 10/03/21  0636 09/30/21  0810 09/27/21  0643   WBC 3.6* 4.1 5.1   HGB 11.5* 10.8* 11.4*   HCT 36.1* 34.1* 35.5*   * 110* 110*    166 137*      Baldev Carcamo DO

## 2021-10-04 DIAGNOSIS — E78.2 MIXED HYPERLIPIDEMIA: ICD-10-CM

## 2021-10-04 LAB
FOLATE SERPL-MCNC: 38.4 NG/ML
VIT B12 SERPL-MCNC: 1655 PG/ML (ref 193–986)

## 2021-10-04 PROCEDURE — 250N000013 HC RX MED GY IP 250 OP 250 PS 637: Performed by: PSYCHIATRY & NEUROLOGY

## 2021-10-04 PROCEDURE — 250N000013 HC RX MED GY IP 250 OP 250 PS 637: Performed by: STUDENT IN AN ORGANIZED HEALTH CARE EDUCATION/TRAINING PROGRAM

## 2021-10-04 PROCEDURE — 250N000013 HC RX MED GY IP 250 OP 250 PS 637: Performed by: INTERNAL MEDICINE

## 2021-10-04 PROCEDURE — 99232 SBSQ HOSP IP/OBS MODERATE 35: CPT | Performed by: STUDENT IN AN ORGANIZED HEALTH CARE EDUCATION/TRAINING PROGRAM

## 2021-10-04 PROCEDURE — 120N000001 HC R&B MED SURG/OB

## 2021-10-04 PROCEDURE — 250N000013 HC RX MED GY IP 250 OP 250 PS 637: Performed by: PHYSICIAN ASSISTANT

## 2021-10-04 RX ORDER — FOLIC ACID 1 MG/1
1 TABLET ORAL DAILY
DISCHARGE
Start: 2021-10-05 | End: 2021-11-29

## 2021-10-04 RX ORDER — LISINOPRIL 10 MG/1
10 TABLET ORAL DAILY
DISCHARGE
Start: 2021-10-05 | End: 2021-12-09

## 2021-10-04 RX ORDER — LANOLIN ALCOHOL/MO/W.PET/CERES
100 CREAM (GRAM) TOPICAL DAILY
DISCHARGE
Start: 2021-10-05 | End: 2022-02-14

## 2021-10-04 RX ORDER — TAMSULOSIN HYDROCHLORIDE 0.4 MG/1
0.4 CAPSULE ORAL DAILY
DISCHARGE
Start: 2021-10-05 | End: 2021-12-09

## 2021-10-04 RX ORDER — DONEPEZIL HYDROCHLORIDE 5 MG/1
5 TABLET, FILM COATED ORAL AT BEDTIME
DISCHARGE
Start: 2021-10-04 | End: 2021-12-09

## 2021-10-04 RX ADMIN — PANTOPRAZOLE SODIUM 40 MG: 40 TABLET, DELAYED RELEASE ORAL at 20:16

## 2021-10-04 RX ADMIN — FOLIC ACID 1 MG: 1 TABLET ORAL at 07:40

## 2021-10-04 RX ADMIN — LISINOPRIL 10 MG: 10 TABLET ORAL at 07:40

## 2021-10-04 RX ADMIN — DONEPEZIL HYDROCHLORIDE 5 MG: 5 TABLET ORAL at 21:55

## 2021-10-04 RX ADMIN — SENNOSIDES AND DOCUSATE SODIUM 1 TABLET: 50; 8.6 TABLET ORAL at 07:40

## 2021-10-04 RX ADMIN — Medication 25 MCG: at 20:16

## 2021-10-04 RX ADMIN — TAMSULOSIN HYDROCHLORIDE 0.4 MG: 0.4 CAPSULE ORAL at 07:40

## 2021-10-04 RX ADMIN — Medication 25 MCG: at 07:40

## 2021-10-04 RX ADMIN — Medication 1 TABLET: at 07:40

## 2021-10-04 RX ADMIN — MULTIPLE VITAMINS W/ MINERALS TAB 1 TABLET: TAB at 07:40

## 2021-10-04 RX ADMIN — Medication 1 TABLET: at 12:16

## 2021-10-04 RX ADMIN — SENNOSIDES AND DOCUSATE SODIUM 1 TABLET: 50; 8.6 TABLET ORAL at 20:16

## 2021-10-04 RX ADMIN — QUETIAPINE FUMARATE 25 MG: 25 TABLET ORAL at 20:17

## 2021-10-04 RX ADMIN — THIAMINE HCL TAB 100 MG 100 MG: 100 TAB at 07:40

## 2021-10-04 RX ADMIN — Medication 1 TABLET: at 17:14

## 2021-10-04 RX ADMIN — Medication 5 MG: at 21:55

## 2021-10-04 ASSESSMENT — ACTIVITIES OF DAILY LIVING (ADL)
ADLS_ACUITY_SCORE: 13
ADLS_ACUITY_SCORE: 11
ADLS_ACUITY_SCORE: 13
ADLS_ACUITY_SCORE: 13
ADLS_ACUITY_SCORE: 11
ADLS_ACUITY_SCORE: 11

## 2021-10-04 NOTE — PLAN OF CARE
Pt alert and oriented to self and time, impulsive. VSS. Pt calls appropriately but has minor confusion. Denies pain. Up with SBA, walker and GB. Voiding. No IV access. Dressing to back CDI. CMS intact. Plan to discharge to TCU today.

## 2021-10-04 NOTE — PROGRESS NOTES
Care Management Follow Up    Length of Stay (days): 11    Expected Discharge Date: 10/04/2021     Concerns to be Addressed:  Difficult TCU placement due to his impulsiveness and Etoh     Patient plan of care discussed at interdisciplinary rounds: Yes    Anticipated Discharge Disposition:  TCU     Anticipated Discharge Services:  TCU  Anticipated Discharge DME:  TCU will provide    Patient/family educated on Medicare website which has current facility and service quality ratings: yes   Education Provided on the Discharge Plan:yes    Patient/Family in Agreement with the Plan: Wife,  Cam    Referrals Placed by CM/SW:  yes  Private pay costs discussed: private room/amenity fees, transportation costs and insurance costs out of pocket expenses and co-pays    Additional Information:  Met with patient's wife to discuss disposition. We are having a difficult time finding a TCU bed for patient due to his impulsiveness and Etoh. We discussed that we would have to expand our search for TCU and she agrees with that. She has thought about hiring someone 24/7 to be with patient but she has decided that he would not be safe as he would want to leave their house to get liquor. More referrals have been made.    SMITH will continue to assist with discharge planning.    GOSIA Meng   Inpatient Care Coordination   Supervisor  [facility]  [phone]      MACKENZIE Patel

## 2021-10-04 NOTE — PROGRESS NOTES
[facility]  Hospitalist Progress Note  Patient Name: Armen Phelps    MRN: [id number]  Provider: Baldev Carcamo,   10/04/21             Assessment and Plan:      Armen Phelps is a 76 year old male who was admitted on 9/23/2021. Past medical history is significant for hypertension, hyperlipidemia, GERD, and alcohol dependence.  He was admitted for elective L1-2 posterior lumbar fusion with revision decompression and discectomy.  MedicineTeam consulted for comanagement.     Mr. Phelps is a daily alcohol user.  He went into severe withdrawal in the evening of 9/23-9/24. This seems to have improved.  Wife is unsure when he had last drink. He was initially on CIWA protocol but this has now been discontinued as he did not require any benzodiazepine administration.     He completed high-dose IV thiamine given persistent encephalopathy. Now improved, however, he still is disoriented to situation.    He has not required further pain medication post operatively, plan for TCU for rehab and f/u with ortho as an outpatient.     Has now been without sitter for >24 hours.  Pending placement to TCU.    Assessment & Plan:  Delirium  Alcohol use d/o with withdrawal: Severe withdrawal started on the evening of 9/23-9/24. CIWA now discontinued. Ongoing disorientation to situation but is no longer impulsive. Has been without a sitter for >24 hours.   - Strict alcohol cessation moving forward - previously discussed   - Completed high dose thiamine, now on thiamine 100 daily  - Awaiting placement to TCU. Wife agrees with plan.      Status post L1-L2 fusion revision decompression and discectomy: Underwent surgery on 9/23.  Tolerated well.  - Postop pain management, anticoagulation, and physical therapy per primary surgical team  - Physical therapy consulted and recommending TCU    Hypertension: Prior to admission patient was on lisinopril but this was discontinued due to renal insufficiency  - Lisinopril 10 mg daily  - As  "needed hydralazine available only for SBP >180     Thrombocytopenia, resolved: Suspect secondary to alcohol use. Monitor. No evidence of bleeding. Hgb stable.     Hyperlipidemia: Continue statin     Hypokalemia, resolved: Replacement protocol     GERD: Continue PPI       DVT Prophylaxis: Defer to primary service  Code Status: Full Code  Dispo: Medically ready for discharge. Pending placement.        Interval History:      No big events overnight. The patient used the call light appropriately. Remains without a sitter for >24 hours.     He denies complaints again this morning besides the occasional ache in his right hip.                   Physical Exam:      Last Vital Signs:  /77 (BP Location: Right arm)   Pulse 71   Temp 98.6  F (37  C) (Temporal)   Resp 20   Ht 1.778 m (5' 10\")   Wt 83.5 kg (184 lb)   SpO2 97%   BMI 26.40 kg/m      Intake/Output Summary (Last 24 hours) at 9/29/2021 2301  Last data filed at 9/29/2021 2132  Gross per 24 hour   Intake 503 ml   Output --   Net 503 ml       GENERAL:  Comfortable. Cooperative.  Sitting up in a chair.   PSYCH: pleasant, oriented to person, year, month, date, and place.  Takes reminders to remember why he is in the hospital.. No acute distress.  EYES: PERRLA, Normal conjunctiva.  HEART:  RRR. No JVD. No peripheral edema.  LUNGS:  Clear to auscultation, normal respiratory effort.  ABDOMEN:  Soft, no hepatosplenomegaly, normal bowel sounds.  EXTREMETIES: No clubbing, cyanosis or ischemia.   SKIN:  Dry to touch, No rash. Midline scar along lumbar spine is covered by a dressing that is CDI.           Medications:      All current medications were reviewed.         Data:      All new lab and imaging data was reviewed.   Labs:       Lab Results   Component Value Date     09/27/2021     09/26/2021     09/25/2021    .0 07/15/2021    .8 04/26/2021    .6 04/12/2021    Lab Results   Component Value Date    CHLORIDE 107 09/27/2021    " CHLORIDE 106 09/26/2021    CHLORIDE 111 09/25/2021    CHLORIDE 104.6 07/15/2021    CHLORIDE 92.0 04/26/2021    CHLORIDE 92.9 04/12/2021    Lab Results   Component Value Date    BUN 10 09/27/2021    BUN 7 09/26/2021    BUN 6 09/25/2021    BUN 6 07/15/2021    BUN 6.3 07/15/2021    BUN 13 04/26/2021    BUN 8.1 04/26/2021    BUN 11 04/12/2021    BUN 8.0 04/12/2021      Lab Results   Component Value Date    POTASSIUM 3.7 09/29/2021    POTASSIUM 3.7 09/28/2021    POTASSIUM 3.7 09/27/2021    POTASSIUM 3.68 07/15/2021    POTASSIUM 3.55 04/26/2021    POTASSIUM 3.73 04/12/2021    Lab Results   Component Value Date    CO2 27 09/27/2021    CO2 28 09/26/2021    CO2 26 09/25/2021    CO2 30.4 07/15/2021    CO2 28.2 04/26/2021    CO2 28.6 04/12/2021    Lab Results   Component Value Date    CR 0.79 09/27/2021    CR 0.70 09/26/2021    CR 0.70 09/25/2021    CR 0.96 07/15/2021    CR 1.61 04/26/2021    CR 1.37 04/12/2021        Recent Labs   Lab 10/03/21  0636 09/30/21  0810   WBC 3.6* 4.1   HGB 11.5* 10.8*   HCT 36.1* 34.1*   * 110*    166      Baldev Carcamo DO  Hospitalist  [facility]

## 2021-10-04 NOTE — PLAN OF CARE
"Patient a&ox4 throughout shift, sometimes forgetful but otherwise more oriented than previous shifts. VSS. Denies pain, but reports some soreness in generalized back area. Dressing CDI. Amblating in room/hallways with walker/gb and assist of 1. Voiding/eating adequately. Waiting for TCU placement. Continue with POC.     /80 (BP Location: Right arm)   Pulse 77   Temp 98.8  F (37.1  C) (Temporal)   Resp 20   Ht 1.778 m (5' 10\")   Wt 83.5 kg (184 lb)   SpO2 99%   BMI 26.40 kg/m      "

## 2021-10-04 NOTE — PLAN OF CARE
"/71   Pulse 84   Temp 98.7  F (37.1  C) (Temporal)   Resp 16   Ht 1.778 m (5' 10\")   Wt 83.5 kg (184 lb)   SpO2 99%   BMI 26.40 kg/m    Orientation: Alert to self and time, disoriented to place and situation and impulsive at times better than yesterday only setting off alarms a few times.  Resp: LS Clear, RA  Neuro: f/up in 4 weeks, Aricept started.  Pain: Tylenol  CMS: Intact, denies N/T  Dressing: dressing changed, CDI  Activity: A1 walker/gb walked vazquez several times this shift.  Diet: tolerating regular  Voiding: spontaneously without difficulty  Discharge Plan: TCU     "

## 2021-10-05 PROCEDURE — 250N000013 HC RX MED GY IP 250 OP 250 PS 637: Performed by: INTERNAL MEDICINE

## 2021-10-05 PROCEDURE — 120N000001 HC R&B MED SURG/OB

## 2021-10-05 PROCEDURE — 99232 SBSQ HOSP IP/OBS MODERATE 35: CPT | Performed by: INTERNAL MEDICINE

## 2021-10-05 PROCEDURE — 250N000013 HC RX MED GY IP 250 OP 250 PS 637: Performed by: PHYSICIAN ASSISTANT

## 2021-10-05 PROCEDURE — 250N000013 HC RX MED GY IP 250 OP 250 PS 637: Performed by: PSYCHIATRY & NEUROLOGY

## 2021-10-05 PROCEDURE — 250N000013 HC RX MED GY IP 250 OP 250 PS 637: Performed by: STUDENT IN AN ORGANIZED HEALTH CARE EDUCATION/TRAINING PROGRAM

## 2021-10-05 RX ORDER — SIMVASTATIN 80 MG
TABLET ORAL
Qty: 90 TABLET | Refills: 0 | Status: SHIPPED | OUTPATIENT
Start: 2021-10-05 | End: 2021-11-24 | Stop reason: ALTCHOICE

## 2021-10-05 RX ADMIN — QUETIAPINE FUMARATE 25 MG: 25 TABLET ORAL at 15:44

## 2021-10-05 RX ADMIN — LISINOPRIL 10 MG: 10 TABLET ORAL at 08:13

## 2021-10-05 RX ADMIN — DONEPEZIL HYDROCHLORIDE 5 MG: 5 TABLET ORAL at 21:45

## 2021-10-05 RX ADMIN — Medication 25 MCG: at 08:13

## 2021-10-05 RX ADMIN — Medication 1 TABLET: at 12:25

## 2021-10-05 RX ADMIN — Medication 25 MCG: at 19:48

## 2021-10-05 RX ADMIN — PANTOPRAZOLE SODIUM 40 MG: 40 TABLET, DELAYED RELEASE ORAL at 19:49

## 2021-10-05 RX ADMIN — Medication 5 MG: at 21:45

## 2021-10-05 RX ADMIN — FOLIC ACID 1 MG: 1 TABLET ORAL at 08:13

## 2021-10-05 RX ADMIN — QUETIAPINE FUMARATE 25 MG: 25 TABLET ORAL at 19:48

## 2021-10-05 RX ADMIN — Medication 1 TABLET: at 08:13

## 2021-10-05 RX ADMIN — SENNOSIDES AND DOCUSATE SODIUM 1 TABLET: 50; 8.6 TABLET ORAL at 08:13

## 2021-10-05 RX ADMIN — THIAMINE HCL TAB 100 MG 100 MG: 100 TAB at 08:13

## 2021-10-05 RX ADMIN — SENNOSIDES AND DOCUSATE SODIUM 1 TABLET: 50; 8.6 TABLET ORAL at 19:48

## 2021-10-05 RX ADMIN — TAMSULOSIN HYDROCHLORIDE 0.4 MG: 0.4 CAPSULE ORAL at 08:13

## 2021-10-05 RX ADMIN — MULTIPLE VITAMINS W/ MINERALS TAB 1 TABLET: TAB at 08:13

## 2021-10-05 RX ADMIN — Medication 1 TABLET: at 17:59

## 2021-10-05 ASSESSMENT — ACTIVITIES OF DAILY LIVING (ADL)
ADLS_ACUITY_SCORE: 11
ADLS_ACUITY_SCORE: 13
ADLS_ACUITY_SCORE: 11
ADLS_ACUITY_SCORE: 11
ADLS_ACUITY_SCORE: 13
ADLS_ACUITY_SCORE: 11

## 2021-10-05 NOTE — PLAN OF CARE
Confused at times, non-impulsive. SBA w/ GB/WW. Tolerating diet. Voiding. New dressing placed as the old had rolled detention off. Plan is TCU on discharge, awaiting placement.Will continue to monitor.

## 2021-10-05 NOTE — PLAN OF CARE
Admitting Diagnosis: L1-L2 PLIF   Pertinent History: Alcohol dependence, GERD  Living Situation: Home with spouse  Pain plan: oxycodone, tylenol, 01/0 pain, pt slept this shift, no behavior notified.  Mobility: assistance, 1 person  Baseline activity: assist stand by  Alarms/Safety: Bed Alarm  LDA's: none  Pertinent test results: K-3.9  Consults: Social Work or Care Coordination   Abnormals/Pending: none,  Other Cares/Comments: incision on lynne is CDI, covered with waterproof dressing.   Discharge Disposition: Transitional Care Unit  Discharge Time: TBD  Bed side nurse: Pretty Lopez

## 2021-10-05 NOTE — PROGRESS NOTES
[facility]  Hospitalist Progress Note  Patient Name: Armen Phelps    MRN: [id number]  Provider: Priyank Marino MD             Assessment and Plan:      Armen Phelps is a 76 year old male who was admitted on 9/23/2021. Past medical history is significant for hypertension, hyperlipidemia, GERD, and alcohol dependence.  He was admitted for elective L1-2 posterior lumbar fusion with revision decompression and discectomy.  MedicineTeam consulted for comanagement.     Mr. Phelps is a daily alcohol user.  He went into severe withdrawal in the evening of 9/23-9/24. This seems to have improved. Wife is unsure when he had last drink. He was initially on CIWA protocol but this has now been discontinued as he did not require any benzodiazepine administration.     He completed high-dose IV thiamine given persistent encephalopathy. Now improved, however, he still is disoriented to situation.    He has not required further pain medication post operatively, plan for TCU for rehab and f/u with ortho as an outpatient.     Has now been without sitter for >48 hours.  Pending placement to TCU.    Assessment & Plan:  Delirium  Alcohol use d/o with withdrawal:   -Severe withdrawal started on the evening of 9/23-9/24. CIWA now discontinued. He is no longer impulsive at this moment. Has been without a sitter for >48 hours.   -Strict alcohol cessation moving forward - previously discussed   -Completed high dose thiamine, now on thiamine 100 daily  -Awaiting placement to TCU.   - following for discharge planning     Status post L1-L2 fusion revision decompression and discectomy: Underwent surgery on 9/23.  Tolerated well.  -Postop pain management, anticoagulation, and physical therapy per primary surgical team  -Physical therapy consulted and recommending TCU    Hypertension: Prior to admission patient was on lisinopril but this was discontinued due to renal insufficiency  -Lisinopril 10 mg daily  -As needed  "hydralazine available only for SBP >180     Thrombocytopenia, resolved: Suspect secondary to alcohol use. Monitor. No evidence of bleeding. Hgb stable.     Hyperlipidemia: Continue statin     Hypokalemia, resolved: Replacement protocol     GERD: Continue PPI       DVT Prophylaxis: Defer to primary service  Code Status: Full Code  Dispo: Medically ready for discharge. Pending placement.   following for discharge planning        Interval History:      Patient seen and examined. He stated that he is feeling much better. He denies nausea or vomiting. No abdominal pain. No cough or shortness of breath.         Physical Exam:      Last Vital Signs:  /83 (BP Location: Right arm)   Pulse 72   Temp 97.8  F (36.6  C) (Temporal)   Resp 18   Ht 1.778 m (5' 10\")   Wt 83.5 kg (184 lb)   SpO2 98%   BMI 26.40 kg/m      Intake/Output Summary (Last 24 hours) at 9/29/2021 2301  Last data filed at 9/29/2021 2132  Gross per 24 hour   Intake 503 ml   Output --   Net 503 ml       GENERAL:  Comfortable. Cooperative.  Sitting up in a chair.   PSYCH: pleasant, oriented to person, year, month, date, and place.  Takes reminders to remember why he is in the hospital.. No acute distress.  EYES: PERRLA, Normal conjunctiva.  HEART:  RRR. No JVD. No peripheral edema.  LUNGS:  Clear to auscultation, normal respiratory effort.  ABDOMEN:  Soft, no hepatosplenomegaly, normal bowel sounds.  EXTREMETIES: No clubbing, cyanosis or ischemia.   SKIN:  Dry to touch, No rash. Midline scar along lumbar spine is covered by a dressing that is CDI.           Medications:      All current medications were reviewed.         Data:      All new lab and imaging data was reviewed.   Labs:       Lab Results   Component Value Date     09/27/2021     09/26/2021     09/25/2021    .0 07/15/2021    .8 04/26/2021    .6 04/12/2021    Lab Results   Component Value Date    CHLORIDE 107 09/27/2021    CHLORIDE 106 " 09/26/2021    CHLORIDE 111 09/25/2021    CHLORIDE 104.6 07/15/2021    CHLORIDE 92.0 04/26/2021    CHLORIDE 92.9 04/12/2021    Lab Results   Component Value Date    BUN 10 09/27/2021    BUN 7 09/26/2021    BUN 6 09/25/2021    BUN 6 07/15/2021    BUN 6.3 07/15/2021    BUN 13 04/26/2021    BUN 8.1 04/26/2021    BUN 11 04/12/2021    BUN 8.0 04/12/2021      Lab Results   Component Value Date    POTASSIUM 3.7 09/29/2021    POTASSIUM 3.7 09/28/2021    POTASSIUM 3.7 09/27/2021    POTASSIUM 3.68 07/15/2021    POTASSIUM 3.55 04/26/2021    POTASSIUM 3.73 04/12/2021    Lab Results   Component Value Date    CO2 27 09/27/2021    CO2 28 09/26/2021    CO2 26 09/25/2021    CO2 30.4 07/15/2021    CO2 28.2 04/26/2021    CO2 28.6 04/12/2021    Lab Results   Component Value Date    CR 0.79 09/27/2021    CR 0.70 09/26/2021    CR 0.70 09/25/2021    CR 0.96 07/15/2021    CR 1.61 04/26/2021    CR 1.37 04/12/2021        Recent Labs   Lab 10/03/21  0636 09/30/21  0810   WBC 3.6* 4.1   HGB 11.5* 10.8*   HCT 36.1* 34.1*   * 110*    166

## 2021-10-05 NOTE — TELEPHONE ENCOUNTER
Armen Phelps is requesting a refill of:    Pending Prescriptions:                       Disp   Refills    simvastatin (ZOCOR) 80 MG tablet [Pharmac*90 tab*0            Sig: TAKE ONE TABLET BY MOUTH IN THE EVENING     Please close encounter if RX was sent. Thanks, Halima

## 2021-10-06 ENCOUNTER — APPOINTMENT (OUTPATIENT)
Dept: PHYSICAL THERAPY | Facility: CLINIC | Age: 76
DRG: 459 | End: 2021-10-06
Attending: ORTHOPAEDIC SURGERY
Payer: COMMERCIAL

## 2021-10-06 PROCEDURE — 99232 SBSQ HOSP IP/OBS MODERATE 35: CPT | Performed by: INTERNAL MEDICINE

## 2021-10-06 PROCEDURE — 97116 GAIT TRAINING THERAPY: CPT | Mod: GP

## 2021-10-06 PROCEDURE — 250N000013 HC RX MED GY IP 250 OP 250 PS 637: Performed by: PSYCHIATRY & NEUROLOGY

## 2021-10-06 PROCEDURE — 250N000013 HC RX MED GY IP 250 OP 250 PS 637: Performed by: STUDENT IN AN ORGANIZED HEALTH CARE EDUCATION/TRAINING PROGRAM

## 2021-10-06 PROCEDURE — 250N000013 HC RX MED GY IP 250 OP 250 PS 637: Performed by: INTERNAL MEDICINE

## 2021-10-06 PROCEDURE — 250N000013 HC RX MED GY IP 250 OP 250 PS 637: Performed by: PHYSICIAN ASSISTANT

## 2021-10-06 PROCEDURE — 97530 THERAPEUTIC ACTIVITIES: CPT | Mod: GP

## 2021-10-06 PROCEDURE — 120N000001 HC R&B MED SURG/OB

## 2021-10-06 RX ADMIN — SENNOSIDES AND DOCUSATE SODIUM 1 TABLET: 50; 8.6 TABLET ORAL at 19:52

## 2021-10-06 RX ADMIN — Medication 25 MCG: at 19:52

## 2021-10-06 RX ADMIN — QUETIAPINE FUMARATE 25 MG: 25 TABLET ORAL at 19:52

## 2021-10-06 RX ADMIN — MULTIPLE VITAMINS W/ MINERALS TAB 1 TABLET: TAB at 08:07

## 2021-10-06 RX ADMIN — Medication 5 MG: at 21:07

## 2021-10-06 RX ADMIN — DONEPEZIL HYDROCHLORIDE 5 MG: 5 TABLET ORAL at 21:07

## 2021-10-06 RX ADMIN — Medication 25 MCG: at 08:07

## 2021-10-06 RX ADMIN — Medication 1 TABLET: at 08:07

## 2021-10-06 RX ADMIN — THIAMINE HCL TAB 100 MG 100 MG: 100 TAB at 08:07

## 2021-10-06 RX ADMIN — SENNOSIDES AND DOCUSATE SODIUM 1 TABLET: 50; 8.6 TABLET ORAL at 08:07

## 2021-10-06 RX ADMIN — TAMSULOSIN HYDROCHLORIDE 0.4 MG: 0.4 CAPSULE ORAL at 08:07

## 2021-10-06 RX ADMIN — Medication 1 TABLET: at 13:18

## 2021-10-06 RX ADMIN — Medication 1 TABLET: at 16:54

## 2021-10-06 RX ADMIN — LISINOPRIL 10 MG: 10 TABLET ORAL at 08:07

## 2021-10-06 RX ADMIN — POLYETHYLENE GLYCOL 3350 17 G: 17 POWDER, FOR SOLUTION ORAL at 08:08

## 2021-10-06 RX ADMIN — FOLIC ACID 1 MG: 1 TABLET ORAL at 08:07

## 2021-10-06 RX ADMIN — PANTOPRAZOLE SODIUM 40 MG: 40 TABLET, DELAYED RELEASE ORAL at 19:52

## 2021-10-06 ASSESSMENT — ACTIVITIES OF DAILY LIVING (ADL)
ADLS_ACUITY_SCORE: 13
ADLS_ACUITY_SCORE: 13
ADLS_ACUITY_SCORE: 11
ADLS_ACUITY_SCORE: 13

## 2021-10-06 NOTE — PROGRESS NOTES
[facility]  Hospitalist Progress Note  Patient Name: Armen Phelps    MRN: [id number]  Provider: Priyank Marino MD             Assessment and Plan:      Armen Phelps is a 76 year old male who was admitted on 9/23/2021. Past medical history is significant for hypertension, hyperlipidemia, GERD, and alcohol dependence.  He was admitted for elective L1-2 posterior lumbar fusion with revision decompression and discectomy.  MedicineTeam consulted for comanagement.     Mr. Phelps is a daily alcohol user.  He went into severe withdrawal in the evening of 9/23-9/24. He was initially on CIWA protocol but this has now been discontinued as his withdrawal symptoms improved. He completed high-dose IV thiamine given persistent encephalopathy. Now improved. He has been having intermittent impulsiveness. Has now been without sitter for >72 hours.  Pending placement to TCU. following for discharge planning.     Assessment & Plan:  Delirium  Alcohol use d/o with withdrawal:   -Severe withdrawal started on the evening of 9/23-9/24.  He has been placed on CIWA protocol.  CIWA now discontinued. He is no longer impulsive at this moment. Has been without a sitter for more than 72 hours.  -Strict alcohol cessation moving forward - previously discussed   -Completed high dose thiamine, now on thiamine 100 daily  -Patient's wife unable to take him home as she did not feel he would be safe and would want to leave to get liquor.   - consulted for discharge to TCU     Status post L1-L2 fusion revision decompression and discectomy: Underwent surgery on 9/23.  Tolerated well.  -Postop pain management, anticoagulation, and physical therapy per primary surgical team  -Physical therapy consulted and recommending TCU.   following for discharge planning    Hypertension: Prior to admission patient was on lisinopril but this was discontinued due to renal insufficiency  -Lisinopril 10 mg daily  -As  "needed hydralazine available only for SBP >180     Thrombocytopenia, resolved: Suspect secondary to alcohol use. Monitor. No evidence of bleeding. Hgb stable.     Hyperlipidemia: Continue statin     Hypokalemia, resolved: Replacement protocol     GERD: Continue PPI    DVT Prophylaxis: Defer to primary service  Code Status: Full Code  Dispo: Medically ready for discharge. Pending placement.  Discharge per primary team.   following for discharge planning        Interval History:      Patient seen and examined today.  He stated that he is feeling better.  He denies pain.  No nausea or vomiting.  He is not agitated.  He denies cough, shortness of breath, abdominal pain, dysuria, urgency or frequency.         Physical Exam:      Last Vital Signs:  /71 (BP Location: Right arm)   Pulse 65   Temp 98  F (36.7  C) (Temporal)   Resp 12   Ht 1.778 m (5' 10\")   Wt 83.5 kg (184 lb)   SpO2 95%   BMI 26.40 kg/m      Intake/Output Summary (Last 24 hours) at 9/29/2021 2301  Last data filed at 9/29/2021 2132  Gross per 24 hour   Intake 503 ml   Output --   Net 503 ml       GENERAL:  Comfortable. Cooperative.  Sitting up in a chair.   PSYCH: pleasant, oriented to person, year, month, date, and place.  Takes reminders to remember why he is in the hospital.. No acute distress.  EYES: PERRLA, Normal conjunctiva.  HEART:  RRR. No JVD. No peripheral edema.  LUNGS:  Clear to auscultation, normal respiratory effort.  ABDOMEN:  Soft, no hepatosplenomegaly, normal bowel sounds.  EXTREMETIES: No clubbing, cyanosis or ischemia.   SKIN:  Dry to touch, No rash. Midline scar along lumbar spine is covered by a dressing that is CDI.           Medications:      All current medications were reviewed.         Data:      All new lab and imaging data was reviewed.   Labs:       Lab Results   Component Value Date     09/27/2021     09/26/2021     09/25/2021    .0 07/15/2021    .8 04/26/2021    .6 " 04/12/2021    Lab Results   Component Value Date    CHLORIDE 107 09/27/2021    CHLORIDE 106 09/26/2021    CHLORIDE 111 09/25/2021    CHLORIDE 104.6 07/15/2021    CHLORIDE 92.0 04/26/2021    CHLORIDE 92.9 04/12/2021    Lab Results   Component Value Date    BUN 10 09/27/2021    BUN 7 09/26/2021    BUN 6 09/25/2021    BUN 6 07/15/2021    BUN 6.3 07/15/2021    BUN 13 04/26/2021    BUN 8.1 04/26/2021    BUN 11 04/12/2021    BUN 8.0 04/12/2021      Lab Results   Component Value Date    POTASSIUM 3.7 09/29/2021    POTASSIUM 3.7 09/28/2021    POTASSIUM 3.7 09/27/2021    POTASSIUM 3.68 07/15/2021    POTASSIUM 3.55 04/26/2021    POTASSIUM 3.73 04/12/2021    Lab Results   Component Value Date    CO2 27 09/27/2021    CO2 28 09/26/2021    CO2 26 09/25/2021    CO2 30.4 07/15/2021    CO2 28.2 04/26/2021    CO2 28.6 04/12/2021    Lab Results   Component Value Date    CR 0.79 09/27/2021    CR 0.70 09/26/2021    CR 0.70 09/25/2021    CR 0.96 07/15/2021    CR 1.61 04/26/2021    CR 1.37 04/12/2021        Recent Labs   Lab 10/03/21  0636 09/30/21  0810   WBC 3.6* 4.1   HGB 11.5* 10.8*   HCT 36.1* 34.1*   * 110*    166

## 2021-10-06 NOTE — PROGRESS NOTES
Care Management Follow Up    Length of Stay (days): 13    Expected Discharge Date: 10/07/2021     Concerns to be Addressed: denies needs/concerns at this time, adjustment to diagnosis/illness, all concerns addressed in this encounter     Patient plan of care discussed at interdisciplinary rounds: Yes    Anticipated Discharge Disposition:       Anticipated Discharge Services:    Anticipated Discharge DME:      Patient/family educated on Medicare website which has current facility and service quality ratings:    Education Provided on the Discharge Plan:    Patient/Family in Agreement with the Plan:      Referrals Placed by CM/SMITH:    Private pay costs discussed: private room/amenity fees, transportation costs and insurance costs out of pocket expenses, co-pays and deductibles    Additional Information:  Met with patient and his wife to discussed continued discharge plans. We continue to look for TCU bed but most facilities are declining due to his impulsive behaviors. They just don't have an available to accomodates his needs. We discussed memory care facilities. Wife is leaving tomorrow for 8 days on a trip to care or her grandchildren. She is comfortable with me making referrals to memory care facilities, [facility] and [facility]. Will make these referrals.    SMITH will continue to work on placement. More referrals made to TCUs.    GOSIA Meng   Inpatient Care Coordination   Supervisor  [facility]  [phone]      MACKENZIE Patel

## 2021-10-06 NOTE — PLAN OF CARE
Forgetful, doesn't always seem to understand where he is and his situation, appears to get worse as day goes on. VSS. SBA w/ GB/WW. Impulsive, requires frequent reminders to use walker. Dressing CDI. CMS intact. Plan is to discharge to TCU, awaiting placement.

## 2021-10-06 NOTE — PLAN OF CARE
Admitting Diagnosis: L1-L2 PLIF   Pertinent History: Alcohol dependence, GERD  Living Situation: Home with spouse  Pain plan: oxycodone, tylenol, 01/0 pain, pt slept this shift, no behavior notified.  Mobility: assistance, 1 person  Baseline activity: assist stand by  Alarms/Safety: Bed Alarm  LDA's: none  Pertinent test results: K-3.9  Consults: Social Work or Care Coordination   Abnormals/Pending: none,  Other Cares/Comments: incision on lynne is CDI, covered with waterproof dressing. Voiding good without any difficulty.  Discharge Disposition: Transitional Care Unit  Discharge Time: TBD  Bed side nurse: Pretty Lopez

## 2021-10-06 NOTE — CONSULTS
CLINICAL NUTRITION SERVICES - REASSESSMENT NOTE      Malnutrition: (10/06/2021)  % Weight Loss: None noted --> PTA, unable to determine during admit without reweigh  % Intake: Decreased intake not longer meets criteria   Subcutaneous Fat Loss:  Orbital region mild depletion and Upper arm region moderate depletion  Muscle Loss:  Temporal region mild depletion, Clavicle bone region moderate depletion, Acromion bone region moderate depletion, Patellar region moderate depletion, Anterior thigh region mild to moderate depletion and Posterior calf region mild to moderate depletion  Fluid Retention: None documented or noted     Malnutrition Diagnosis: Non-Severe malnutrition  In Context of: Chronic illness with underlying Environmental or social circumstances       EVALUATION OF PROGRESS TOWARD GOALS   Diet: Regular, Ensure prn    Intake/Tolerance:  % intakes per flowsheet review since last RD assessment.  Noted has consistently been consuming % of meals over the past ~3 days.  Consistently ordering/receiving meals TID, except for this AM.  He is able to answer questions but also thinks he already ate breakfast - no meal ordered from system, confirmed with RN.  He declined ordering a meal while I was in the room.  He shared that he was a  and used to have 2 standard poodles, though no meaningful information for reassessment.  His wife was not in the room.  Seems confused.    Barriers to PO intakes including: Fluctuating mentation.    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    - LSW following for TCU at discharge.      ASSESSED NUTRITION NEEDS PER APPROVED PRACTICE GUIDELINES:     Dosing Weight 83.5 kg   Estimated Energy Needs: 2258-4500 kcals (25-30 Kcal/Kg)  Justification: maintenance  Estimated Protein Needs:  grams protein (1-1.2+ g pro/Kg)  Justification: preservation of lean body mass  Estimated Fluid Needs: per MD        NEW FINDINGS:   - Medications reviewed including:     calcium carbonate-vitamin D  1 tablet Oral TID AC     donepezil  5 mg Oral At Bedtime     folic acid  1 mg Oral Daily     lisinopril  10 mg Oral Daily     melatonin  5 mg Oral At Bedtime     multivitamin w/minerals  1 tablet Oral Daily     pantoprazole  40 mg Oral QPM     polyethylene glycol  17 g Oral Daily     QUEtiapine  25 mg Oral QPM     senna-docusate  1 tablet Oral BID     tamsulosin  0.4 mg Oral Daily     thiamine  100 mg Oral Daily     cholecalciferol  25 mcg Oral BID         - Labs reviewed including:  Electrolytes  Potassium (mmol/L)   Date Value   10/03/2021 3.9   09/30/2021 4.1   09/29/2021 3.7   07/15/2021 3.68   04/26/2021 3.55   04/12/2021 3.73     Phosphorus (mg/dL)   Date Value   09/23/2021 2.9    Blood Glucose  Glucose (mg/dL)   Date Value   10/03/2021 92   09/30/2021 97   09/27/2021 100 (H)   09/26/2021 99   09/25/2021 96   07/15/2021 112 (A)   04/26/2021 103 (A)   04/12/2021 145 (A)   06/17/2020 150 (A)   07/09/2019 107 (H)     Hemoglobin A1C (%)   Date Value   08/05/2020 4.9    Inflammatory Markers  WBC (10*9/L)   Date Value   07/15/2021 5.4   10/23/2020 4.9   06/17/2020 4.3     WBC Count (10e3/uL)   Date Value   10/03/2021 3.6 (L)   09/30/2021 4.1   09/27/2021 5.1     Albumin (g/dL)   Date Value   09/26/2021 2.9 (L)   06/17/2020 4.0   07/09/2019 3.7   10/20/2018 4.4      Magnesium (mg/dL)   Date Value   09/27/2021 2.0   09/26/2021 1.7   09/23/2021 1.8     Sodium (mmol/L)   Date Value   10/03/2021 142   09/30/2021 138   09/27/2021 140   07/15/2021 141.0   04/26/2021 128.8 (A)   04/12/2021 128.6 (A)    Renal  Urea Nitrogen (mg/dL)   Date Value   10/03/2021 7   09/30/2021 8   09/27/2021 10   07/15/2021 6 (A)   04/26/2021 13   04/12/2021 11     BUN/Creatinine Ratio (no units)   Date Value   07/15/2021 6.3   04/26/2021 8.1   04/12/2021 8.0     Creatinine (mg/dL)   Date Value   10/03/2021 0.90   09/30/2021 0.80   09/27/2021 0.79    07/15/2021 0.96   04/26/2021 1.61 (A)   04/12/2021 1.37 (A)     Additional  Ketones Urine (mg/dL)   Date Value   10/23/2020 Other (A)        -  Weight trending reviewed and no updated wt for comparison:  Vitals:    09/23/21 0559   Weight: 83.5 kg (184 lb)     - Stooling patterns reviewed.      Previous Goals:   Pt to consume >/=75% of meals ordered TID   Evaluation: Met over the past 3 days until this AM    Previous Nutrition Diagnosis:   Inadequate oral intake related to suspected variable appetite in the setting of delirium from EtOH withdrawl as evidenced by pt likely consuming <75% of nutritional needs over the past 7 days.   Evaluation: Improving and completed      CURRENT NUTRITION DIAGNOSIS  Predicted suboptimal nutrient intake (energy/protein) related to potential for again variable PO intakes based on mentation, had improved consistently over the past 3 days.    INTERVENTIONS  Recommendations / Nutrition Prescription  Diet per MD.    Ok to offer Ensure if skips a meal.     Updated weight during admit as able.      Implementation  Collaboration and Referral of Nutrition care: Discussed PO intakes with RN.    Goals  Patient to consume at least 75% of meals or supplements TID.       MONITORING AND EVALUATION:  Progress towards goals will be monitored and evaluated per protocol and Practice Guidelines      Felecia Ramírez RDN, LD  Clinical Dietitian  3rd floor/ICU: [phone]  All other floors: [phone]  Weekend/holiday: [phone]

## 2021-10-07 ENCOUNTER — APPOINTMENT (OUTPATIENT)
Dept: PHYSICAL THERAPY | Facility: CLINIC | Age: 76
DRG: 459 | End: 2021-10-07
Attending: ORTHOPAEDIC SURGERY
Payer: COMMERCIAL

## 2021-10-07 PROCEDURE — 97116 GAIT TRAINING THERAPY: CPT | Mod: GP | Performed by: PHYSICAL THERAPIST

## 2021-10-07 PROCEDURE — 250N000013 HC RX MED GY IP 250 OP 250 PS 637: Performed by: PSYCHIATRY & NEUROLOGY

## 2021-10-07 PROCEDURE — 120N000001 HC R&B MED SURG/OB

## 2021-10-07 PROCEDURE — 250N000013 HC RX MED GY IP 250 OP 250 PS 637: Performed by: STUDENT IN AN ORGANIZED HEALTH CARE EDUCATION/TRAINING PROGRAM

## 2021-10-07 PROCEDURE — 99232 SBSQ HOSP IP/OBS MODERATE 35: CPT | Performed by: INTERNAL MEDICINE

## 2021-10-07 PROCEDURE — 250N000013 HC RX MED GY IP 250 OP 250 PS 637: Performed by: PHYSICIAN ASSISTANT

## 2021-10-07 PROCEDURE — 250N000013 HC RX MED GY IP 250 OP 250 PS 637: Performed by: INTERNAL MEDICINE

## 2021-10-07 RX ORDER — HYDROXYZINE HYDROCHLORIDE 10 MG/1
10 TABLET, FILM COATED ORAL EVERY 6 HOURS PRN
Qty: 30 TABLET | Refills: 0 | DISCHARGE
Start: 2021-10-07 | End: 2021-11-24

## 2021-10-07 RX ADMIN — FOLIC ACID 1 MG: 1 TABLET ORAL at 08:21

## 2021-10-07 RX ADMIN — Medication 25 MCG: at 08:21

## 2021-10-07 RX ADMIN — PANTOPRAZOLE SODIUM 40 MG: 40 TABLET, DELAYED RELEASE ORAL at 19:44

## 2021-10-07 RX ADMIN — Medication 5 MG: at 21:18

## 2021-10-07 RX ADMIN — DONEPEZIL HYDROCHLORIDE 5 MG: 5 TABLET ORAL at 21:18

## 2021-10-07 RX ADMIN — Medication 25 MCG: at 19:44

## 2021-10-07 RX ADMIN — SENNOSIDES AND DOCUSATE SODIUM 1 TABLET: 50; 8.6 TABLET ORAL at 08:21

## 2021-10-07 RX ADMIN — THIAMINE HCL TAB 100 MG 100 MG: 100 TAB at 08:21

## 2021-10-07 RX ADMIN — MULTIPLE VITAMINS W/ MINERALS TAB 1 TABLET: TAB at 08:21

## 2021-10-07 RX ADMIN — Medication 1 TABLET: at 11:54

## 2021-10-07 RX ADMIN — QUETIAPINE FUMARATE 25 MG: 25 TABLET ORAL at 19:44

## 2021-10-07 RX ADMIN — TAMSULOSIN HYDROCHLORIDE 0.4 MG: 0.4 CAPSULE ORAL at 08:21

## 2021-10-07 RX ADMIN — Medication 1 TABLET: at 08:21

## 2021-10-07 RX ADMIN — LISINOPRIL 10 MG: 10 TABLET ORAL at 08:21

## 2021-10-07 RX ADMIN — Medication 1 TABLET: at 16:56

## 2021-10-07 ASSESSMENT — ACTIVITIES OF DAILY LIVING (ADL)
ADLS_ACUITY_SCORE: 9
ADLS_ACUITY_SCORE: 9
ADLS_ACUITY_SCORE: 11
ADLS_ACUITY_SCORE: 9
ADLS_ACUITY_SCORE: 9
ADLS_ACUITY_SCORE: 11

## 2021-10-07 NOTE — PROGRESS NOTES
"Patient is pleasantly confused. Patient continues to  Forget to use the call light and turns the bed alarm trying to get up by himself.  SBA with [place] belt and walker. Dressing on the back CDI. Voiding.  Patient is waiting on Tcu placement. VSS. Will kanchan to monitor.   Blood pressure (!) 148/77, pulse 69, temperature 97.7  F (36.5  C), temperature source Temporal, resp. rate 16, height 1.778 m (5' 10\"), weight 83.5 kg (184 lb), SpO2 99 %.          "

## 2021-10-07 NOTE — PLAN OF CARE
Disoriented to situation. Impulsive at times. Easily reoriented. Ax1 w/ GB/WW. Tolerating diet. Voiding. Plan is to discharge to TCU tomorrow. Will continue to monitor.

## 2021-10-07 NOTE — PROGRESS NOTES
[facility]  Hospitalist Progress Note  Suzanne Lopez MD 10/07/21  Text Page  Pager: [phone] (7am-6pm)    Reason for Stay (Diagnosis): lumbar surgery         Assessment and Plan:      Summary of Stay: Armen Phelps is a 76 year old male with past medical history of hypertension, hyperlipidemia, GERD, and alcohol dependence who was admitted on 9/23/2021 after elective L1-2 posterior lumbar fusion with revision decompression and discectomy.  Medicine Team consulted for comanagement.     Mr. Phelps is a daily alcohol user.  He went into severe withdrawal in the evening of 9/23-9/24. He was initially on CIWA protocol but this has now been discontinued as his withdrawal symptoms resolved. He completed high-dose IV thiamine given persistent encephalopathy. Now improved. He has been having intermittent impulsiveness. He will discharge to TCU.    Problem List/Assessment and Plan:      Delirium  Acute Alcohol use with Severe withdrawal - Resolved:   -Severe withdrawal started on the evening of 9/23-9/24.  He has been placed on CIWA protocol.  CIWA now discontinued. He is no longer impulsive at this moment. Has been without a sitter for more than 72 hours prior to discharge.  -Strict alcohol cessation moving forward - previously discussed   -Completed high dose thiamine, now on thiamine 100 daily  -Patient's wife unable to take him home as she did not feel he would be safe and would want to leave to get liquor.   - consulted for discharge to TCU, continue therapies at TCU     Status post L1-L2 fusion revision decompression and discectomy: Underwent surgery on 9/23.  Tolerated well.  -Postop pain management, anticoagulation, and physical therapy per primary surgical team  -Discharge to TCU     Hypertension: Prior to admission patient was on lisinopril but this was discontinued due to renal insufficiency which subsequently resolved.  Continue PTA Lisinopril.     Thrombocytopenia, resolved:  "Suspect secondary to alcohol use. Monitor. No evidence of bleeding. Hgb stable.     Hyperlipidemia: Continue statin     Hypokalemia, resolved: Replacement protocol     GERD: Continue PPI     Non Severe Malnutrition: Nutrition following.    Diet: Advance Diet as Tolerated: Regular Diet Adult  Discharge Instruction - Regular Diet Adult  Advance Diet as Tolerated  Snacks/Supplements Adult: Ensure Enlive; Between Meals    DVT Prophylaxis: Ambulate every shift  Reed Catheter: Not present  Code Status: Full Code      Disposition Plan   Expected discharge: As soon as TCU bed available.    Entered: Suzanne Lopez MD 10/07/2021, 2:43 PM       The patient's care was discussed with the Bedside Nurse and Care Coordinator/.    Hospitalist Service  [facility]          Interval History (Subjective):      Patient states he is feeling well.  He wants to take showers get a friend visiting later today.  Denies back pain.  No chest pain, shortness of breath, nausea or vomiting.  Eating and drinking well.  He is understanding that we are awaiting TCU placement.                  Physical Exam:      Last Vital Signs:  /74 (BP Location: Right arm)   Pulse 66   Temp 97.7  F (36.5  C) (Temporal)   Resp 18   Ht 1.778 m (5' 10\")   Wt 83.5 kg (184 lb)   SpO2 98%   BMI 26.40 kg/m      General: Alert, awake, no acute distress.  HEENT: Normocephalic and atraumatic, eyes anicteric and without scleral injection, EOMI, face symmetric, MMM.  Cardiac: RRR, normal S1, S2. No m/g/r, no LE edema.  Pulmonary: Normal chest rise, normal work of breathing.  Lungs CTAB without crackles or wheezing.  Abdomen: soft, non-tender, non-distended.  Normoactive bowel sounds, no guarding or rebound tenderness.  Extremities: no deformities.  Warm, well perfused.  Skin: no rashes or lesions.  Warm and Dry.  Neuro: No focal deficits.  Speech clear.  Coordination and strength grossly normal.  Psych: Alert and oriented x3. " Appropriate affect.         Medications:      All current medications were reviewed with changes reflected in problem list.         Data:      All new lab and imaging data was reviewed.   Labs:  Recent Labs   Lab 10/03/21  0636   WBC 3.6*   HGB 11.5*   HCT 36.1*   *        Recent Labs   Lab 10/03/21  0636      POTASSIUM 3.9   CHLORIDE 110*   CO2 27   ANIONGAP 5   GLC 92   BUN 7   CR 0.90   GFRESTIMATED 83   DESTINEE 9.3      Imaging:   No results found for this or any previous visit (from the past 24 hour(s)).    Suzanne Lopez MD

## 2021-10-07 NOTE — PLAN OF CARE
RN from [id number]:    Pt disorientated to time and situation. VS stable; afebrile. Denies pain. CMS intact. Dressing changed-CDI. Up w/ SBA. Voiding in good amts. Tolerating regular diet. Plan is to discharge to TCU tomorrow. Will continue to monitor.

## 2021-10-07 NOTE — PLAN OF CARE
Care Management Discharge Note    Discharge Date: 10/07/2021  Expected Time of Departure: Discharging today 10/7/21 to [facility] via [facility] wheelchair @ 245    Discharge Disposition: Transitional Care    Discharge Services: None    Discharge DME: None    Discharge Transportation: agency    Private pay costs discussed: private room/amenity fees, transportation costs, and insurance costs out of pocket expenses, co-pays, and deductibles    PAS Confirmation Code:  [id number]  Patient/family educated on Medicare website which has current facility and service quality ratings: yes    Education Provided on the Discharge Plan:  yes  Persons Notified of Discharge Plans: Patient, Armen and wife Cam  Patient/Family in Agreement with the Plan: yes    Handoff Referral Completed: No    Additional Information:  Patient has been accepted at [facility] for today, we are waiting for authorization from [facility]. Informed patient and wife. Reviewed out of pocket cost for [facility] transport, $78.65 for base rate and $5.06 per mile to the destination. Pt/family expressed understanding and are agreeable to this.      Informed unit, MD and facility . Transport wheelchair set for 245pm.    Addendum: Did not receive insurance authorization yet for this patient, rescheduled his ride for tomorrow, 10/8/21 [facility] wheelchair @ 1000am.    Updated unit, family and MD.    GOSIA Meng   Inpatient Care Coordination   Supervisor  [facility]  [phone]      MACKENZIE Patel

## 2021-10-07 NOTE — DISCHARGE SUMMARY
[facility]  Discharge Summary  Name: Armen Phelps    MRN: [id number]  YOB: 1945    Age: 76 year old  Date of Discharge:  10/7/2021  Date of Admission: 9/23/2021  Primary Care Provider: Gerald Diggs  Discharge Physician:  Suzanne Lopez MD  Discharging Service:  Hospitalist      Discharge Diagnoses:  1. Lumbar surgery  2. Alcohol use disorder with severe alcohol withdrawal and delirium  3. HTN  4. Thrombocytopenia  5. HLD  6. Hypokalemia  7. GERD  8. Non severe malnutrition     Follow-ups Needed After Discharge   Follow up with ortho  Follow up with PCP upon discharge from TCU    Unresulted Labs Ordered in the Past 30 Days of this Admission     No orders found from 10/16/2018 to 12/16/2018.        Hospital Course:  Armen Phelps is a 76 year old male with past medical history of hypertension, hyperlipidemia, GERD, and alcohol dependence who was admitted on 9/23/2021 after elective L1-2 posterior lumbar fusion with revision decompression and discectomy.  Medicine Team consulted for comanagement.     Mr. Phelps is a daily alcohol user.  He went into severe withdrawal in the evening of 9/23-9/24. He was initially on CIWA protocol but this has now been discontinued as his withdrawal symptoms resolved. He completed high-dose IV thiamine given persistent encephalopathy. Now improved. He has been having intermittent impulsiveness. He will discharge to TCU.    Delirium  Acute Alcohol use with Severe withdrawal - Resolved:   -Severe withdrawal started on the evening of 9/23-9/24.  He has been placed on CIWA protocol.  CIWA now discontinued. He is no longer impulsive at this moment. Has been without a sitter for more than 72 hours prior to discharge.  -Strict alcohol cessation moving forward - previously discussed   -Completed high dose thiamine, now on thiamine 100 daily  -Patient's wife unable to take him home as she did not feel he would be safe and would want to leave to get liquor.  "  - consulted for discharge to TCU, continue therapies at TCU     Status post L1-L2 fusion revision decompression and discectomy: Underwent surgery on 9/23.  Tolerated well.  -Postop pain management, anticoagulation, and physical therapy per primary surgical team  -Discharge to TCU     Hypertension: Prior to admission patient was on lisinopril but this was discontinued due to renal insufficiency which subsequently resolved.  Continue PTA Lisinopril.     Thrombocytopenia, resolved: Suspect secondary to alcohol use. Monitor. No evidence of bleeding. Hgb stable.     Hyperlipidemia: Continue statin     Hypokalemia, resolved: Replacement protocol     GERD: Continue PPI    Non Severe Malnutrition: Nutrition following.     Discharge Disposition:  Discharged to short-term care facility     Allergies:  Allergies   Allergen Reactions     Oxycodone Other (See Comments)     9/2021 oxycodone post op patient more confused, agitated, and angry after dose given on 2 occassions     Adhesive Tape Hives and Rash     Developed open sores after last spine surgery.        Condition on Discharge:  Discharge condition: Stable   Discharge vitals: Blood pressure 134/74, pulse 66, temperature 97.7  F (36.5  C), temperature source Temporal, resp. rate 18, height 1.778 m (5' 10\"), weight 83.5 kg (184 lb), SpO2 98 %.   Code status on discharge: Full Code     History of Illness:  See detailed admission note for full details.    Physical Exam:  Blood pressure 134/74, pulse 66, temperature 97.7  F (36.5  C), temperature source Temporal, resp. rate 18, height 1.778 m (5' 10\"), weight 83.5 kg (184 lb), SpO2 98 %.  Wt Readings from Last 1 Encounters:   09/23/21 83.5 kg (184 lb)     General: Alert, awake, no acute distress.  HEENT: Normocephalic and atraumatic, eyes anicteric and without scleral injection, EOMI, face symmetric, MMM.  Cardiac: RRR, normal S1, S2. No m/g/r, no LE edema.  Pulmonary: Normal chest rise, normal work of " breathing.  Lungs CTAB without crackles or wheezing.  Abdomen: soft, non-tender, non-distended.  Normoactive bowel sounds, no guarding or rebound tenderness.  Extremities: no deformities.  Warm, well perfused.  Skin: no rashes or lesions.  Warm and Dry.  Neuro: No focal deficits.  Speech clear.  Coordination and strength grossly normal.  Psych: Alert and oriented x3. Appropriate affect.    Procedures other than Imaging:  Lumbar surgery     Imaging:  Results for orders placed or performed during the hospital encounter of 09/23/21   XR Surgery ROBYN L/T 5 Min Fluoro w Stills    Narrative    This exam was marked as non-reportable because it will not be read by a   radiologist or a [place] non-radiologist provider.         XR Surgery ROBYN L/T 5 Min Fluoro w Stills    Narrative    This exam was marked as non-reportable because it will not be read by a   radiologist or a [place] non-radiologist provider.                Consultations:  Consultations This Hospital Stay   HOSPITALIST IP CONSULT  OCCUPATIONAL THERAPY ADULT IP CONSULT  PHYSICAL THERAPY ADULT IP CONSULT  CARE MANAGEMENT / SOCIAL WORK IP CONSULT  CARE MANAGEMENT / SOCIAL WORK IP CONSULT  OCCUPATIONAL THERAPY ADULT IP CONSULT  PHARMACY IP CONSULT  NEUROLOGY IP CONSULT     Recent Lab Results:  Recent Labs   Lab 10/03/21  0636   WBC 3.6*   HGB 11.5*   HCT 36.1*   *        Recent Labs   Lab 10/03/21  0636      POTASSIUM 3.9   CHLORIDE 110*   CO2 27   ANIONGAP 5   GLC 92   BUN 7   CR 0.90   GFRESTIMATED 83   DESTINEE 9.3          Pending Results:    Unresulted Labs Ordered in the Past 30 Days of this Admission     Date and Time Order Name Status Description    9/23/2021  8:36 AM Fungal or Yeast Culture Routine Preliminary            Discharge Instructions and Follow-Up:   Discharge Orders      Anti-Embolism Stockings    Bilateral below knee length.On in the morning, off at night     Reason for your hospital stay    L1-L2 posterior lumbar instrumented  "fusion     When to call - Contact Surgeon Team    You may experience symptoms that require follow-up before your scheduled appointment. Refer to the \"Stoplight Tool\" for instructions on when to contact your Surgeon Team if you are concerned about pain control, blood clots, constipation, or if you are unable to urinate.     When to call - Reach out to Urgent Care    If you are not able to reach your Surgeon Team and you need immediate care, go to the Orthopedic Walk-in Clinic or Urgent Care at your Surgeon's office.  Do NOT go to the Emergency Room unless you have shortness of breath, chest pain, or other signs of a medical emergency.     When to call - Reasons to Call 911    Call 911 immediately if you experience sudden-onset chest pain, arm weakness/numbness, slurred speech, or shortness of breath     Discharge Instruction - Breathing exercises    Perform breathing exercises using your Incentive Spirometer 10 times per hour while awake for 2 weeks.     Symptoms - Fever Management    A low grade fever can be expected after surgery.  Use acetaminophen (TYLENOL) as needed for fever management.  Contact your Surgeon Team if you have a fever greater than 101.5 F, chills, and/or night sweats.     Symptoms - Constipation management    Constipation (hard, dry bowel movements) is expected after surgery due to the combination of being less active, the anesthetic, and the opioid pain medication.  You can do the following to help reduce constipation:  ~  FLUIDS:  Drink clear liquids (water or Gatorade), or juice (apple/prune).  ~  DIET:  Eat a fiber rich diet.    ~  ACTIVITY:  Get up and move around several times a day.  Increase your activity as you are able.  MEDICATIONS:  Reduce the risk of constipation by starting medications before you are constipated.  You can take Miralax   (1 packet as directed) and/or a stool softener (Senokot 1-2 tablets 1-2 times a day).  If you already have constipation and these medications are " not working, you can get magnesium citrate and use as directed.  If you continue to have constipation you can try an over the counter suppository or enema.  Call your Surgeon Team if it has been greater than 3 days since your last bowel movement.     Symptoms - Reduced Urine Output    Changes in the amount of fluids you drank before and after surgery may result in problems urinating.  It is important to stay well-hydrated after surgery and drink plenty of water. If it has been greater than 8 hours since you have urinated despite drinking plenty of water, call your Surgeon Team.     Comfort and Pain Management - Pain after Surgery    Pain after surgery is normal and expected.  You will have some amount of pain for several weeks after surgery.  Your pain will improve with time.  There are several things you can do to help reduce your pain including: rest, ice, elevation, and using pain medications as needed. Contact your Surgeon Team if you have pain that persists or worsens after surgery despite rest, ice, elevation, and taking your medication(s) as prescribed. Contact your Surgeon Team if you have new numbness, tingling, or weakness in your operative extremity.     Comfort and Pain Management - Swelling after Surgery    Swelling and/or bruising of the surgical extremity is common and may persist for several months after surgery. In addition to frequent icing and elevation, gentle compressive support with an ACE wrap or tubigrip may help with swelling. Apply compression regularly, removing at least twice daily to perform skin checks. Contact your Surgeon Team if your swelling increases and is NOT associated with an increase in your activity level, or if your swelling increases and is associated with redness and pain.     Comfort and Pain Management - Cold therapy    Ice can be used to control swelling and discomfort after surgery. Place a thin towel over your operative site and apply the ice pack overtop. Leave ice  pack in place for 20 minutes, then remove for 20 minutes. Repeat this 20 minutes on/20 minutes off routine as often as tolerated.     Medication Instructions - Acetaminophen (TYLENOL) Instructions    You were discharged with acetaminophen (TYLENOL) for pain management after surgery. Acetaminophen most effectively manages pain symptoms when it is taken on a schedule without missing doses (every four, six, or eight hours). Your Provider will prescribe a safe daily dose between 3000 - 4000 mg.  Do NOT exceed this daily dose. Most patients use acetaminophen for pain control for the first four weeks after surgery.  You can wean from this medication as your pain decreases.     Medication Instructions - Opioid Instructions (1 - 2 tablets Q 4-6 hours, MAX 6 tablets)    You were discharged with an opioid medication (hydromorphone, oxycodone, hydrocodone, or tramadol). This medication should only be taken for breakthrough pain that is not controlled with acetaminophen (TYLENOL). If you rate your pain less than 3 you do not need this medication.  Pain rating 0-3:  You do not need this medication.  Pain rating 4-6:  Take 1 tablet every 4-6 hours as needed  Pain rating 7-10:  Take 2 tablets every 4-6 hours as needed.  Do not exceed 6 tablets per day     Medication Instructions - Opioids - Tapering Instructions    In the first three days following surgery, your symptoms may warrant use of the narcotic pain medication every four to six hours as prescribed. This is normal. As your pain symptoms improve, focus your efforts on decreasing (tapering) use of narcotic medications. The most successful tapering strategy is to first, decrease the number of tablets you take every 4-6 hours to the minimum prescribed. Then, increase the amount of time between doses.  For example:  First, taper to   or 1 tablet every 4-6 hours.  Then, taper to   or 1 tablet every 6-8 hours.  Then, taper to   or 1 tablet every 8-10 hours.  Then, taper to   or 1  "tablet every 10-12 hours.  Then, taper to   or 1 tablet at bedtime.  The bedtime dose can help with comfort during sleep and is typically the last dose to be discontinued after surgery.     No VTE Prophylaxis     Comfort and Pain Management - LOWER Extremity Elevation    Swelling is expected for several months after surgery. This type of swelling is usually associated with gravity and activity, and can be improved with elevation.   The best way to do this is to get your \"toes above your nose\" by laying down and placing several pillows lengthwise under your calf and heel. When elevating your leg keep your knee completely straight. Perform this elevation as often as possible especially for the first two weeks after surgery.     General info for SNF    Length of Stay Estimate: Short Term Care: Estimated # of Days <30  Condition at Discharge: Improving  Level of care:skilled   Rehabilitation Potential: Good  Admission H&P remains valid and up-to-date: Yes  Recent Chemotherapy: N/A  Use Nursing Home Standing Orders: Yes     Mantoux instructions    Give two-step Mantoux (PPD) Per Facility Policy Yes     Follow Up and recommended labs and tests    Follow up with Wilder Guy PA-C in two weeks at [facility]. Please call Jeremiah [phone] to schedule.     Reason for your hospital stay    L1-2 posterior lumbar instrumented fusion     Wound care (specify)    Site: Lumbar  Instructions:  Your incision is covered with an Aquacel dressing. This is a waterproof dressing that you are able to shower with. Do not submerge your dressing in water. Do not remove dressing until you are seen in clinic for your two week post op visit.     However, if you notice a significant amount of drainage on your dressing, or there is any concern for possible incision infection, remove to inspect the incision.    If you do remove the dressing prior to your two week visit, please cover with simple dressing. We suggest a folded piece of " gauze with a few pieces of tape to hold in place. This will allow the incision to remain protected. Please make sure to cover your dressing with plastic/waterproof dressing to keep it waterproof if you have removed the initial dressing while in the shower. We ask that you continue to waterproof it until you are seen at your two week post op appointment.     Activity - Up with nursing assistance     Follow Up Care    Follow up with Wilder Guy PA-C in two weeks at [facility]. Please call [phone] to schedule.    You will need follow-up with neurology in about 4 weeks. They should call you to schedule an appointment, however, if they do not, please reach out to your neurology clinic for follow-up.     Full Code    Full code     Fall precautions     Discharge Instruction - Regular Diet Adult    Return to your pre-surgery diet unless instructed otherwise     Advance Diet as Tolerated    Follow this diet upon discharge: Regular     Discharge Medications   Current Discharge Medication List      START taking these medications    Details   !! acetaminophen (TYLENOL) 325 MG tablet Take 2 tablets (650 mg) by mouth every 6 hours as needed for other (mild pain)  Qty: 100 tablet, Refills: 0    Associated Diagnoses: S/P lumbar fusion      calcium carbonate-vitamin D (OSCAL W/D) 500-200 MG-UNIT tablet Take 1 tablet by mouth 3 times daily (with meals)  Qty: 270 tablet, Refills: 0    Associated Diagnoses: S/P lumbar fusion      donepezil (ARICEPT) 5 MG tablet Take 1 tablet (5 mg) by mouth At Bedtime    Associated Diagnoses: Cognitive deficits as late effect of cerebrovascular disease      folic acid (FOLVITE) 1 MG tablet Take 1 tablet (1 mg) by mouth daily    Associated Diagnoses: Alcohol use disorder, mild, abuse      lisinopril (ZESTRIL) 10 MG tablet Take 1 tablet (10 mg) by mouth daily    Associated Diagnoses: Benign essential hypertension      senna-docusate (SENOKOT-S/PERICOLACE) 8.6-50 MG tablet Take  1-2 tablets by mouth 2 times daily Take while on oral narcotics to prevent or treat constipation.  Qty: 30 tablet, Refills: 0    Comments: While taking narcotics  Associated Diagnoses: S/P lumbar fusion      tamsulosin (FLOMAX) 0.4 MG capsule Take 1 capsule (0.4 mg) by mouth daily    Associated Diagnoses: Benign prostatic hyperplasia without lower urinary tract symptoms      thiamine (B-1) 100 MG tablet Take 1 tablet (100 mg) by mouth daily    Associated Diagnoses: Alcohol use disorder, mild, abuse       !! - Potential duplicate medications found. Please discuss with provider.      CONTINUE these medications which have CHANGED    Details   Vitamin D3 (CHOLECALCIFEROL) 25 mcg (1000 units) tablet Take 1 tablet (25 mcg) by mouth 2 times daily  Qty: 180 tablet, Refills: 0    Associated Diagnoses: S/P lumbar fusion         CONTINUE these medications which have NOT CHANGED    Details   !! acetaminophen (TYLENOL) 325 MG tablet Take 325-650 mg by mouth every 6 hours as needed for mild pain      cyanocobalamin (CYANOCOBALAMIN) 1000 MCG/ML injection Inject 1 mL (1,000 mcg) into the muscle every 21 days      Cyanocobalamin (VITAMIN B 12 PO) Take 2,500 mcg by mouth daily      melatonin 5 MG tablet Take 1 tablet (5 mg) by mouth At Bedtime    Associated Diagnoses: Cognitive deficits as late effect of cerebrovascular disease      multivitamin w/minerals (MULTI-VITAMIN) tablet Take 2 tablets by mouth daily       ondansetron (ZOFRAN-ODT) 4 MG ODT tab Take 1 tablet (4 mg) by mouth every 8 hours as needed for nausea  Qty: 90 tablet, Refills: 3    Associated Diagnoses: Hereditary hemochromatosis (H)      pantoprazole (PROTONIX) 40 MG EC tablet Take 1 tablet (40 mg) by mouth every evening  Qty: 90 tablet, Refills: 1    Associated Diagnoses: Essential hypertension      Probiotic Product (PROBIOTIC-10 PO) Take 1 capsule by mouth daily       QUEtiapine (SEROQUEL) 25 MG tablet Take 1 tablet (25 mg) by mouth At Bedtime  Qty: 90 tablet,  Refills: 1    Associated Diagnoses: Sundowning      testosterone cypionate (DEPOTESTOSTERONE) 100 MG/ML injection Inject 50 mg into the muscle every 21 days Last dose 9/9/21      simvastatin (ZOCOR) 80 MG tablet TAKE ONE TABLET BY MOUTH IN THE EVENING   Qty: 90 tablet, Refills: 0    Associated Diagnoses: Mixed hyperlipidemia       !! - Potential duplicate medications found. Please discuss with provider.          Time Spent on this Encounter   I, Suzanne Lopez MD, personally saw the patient today and spent greater than 30 minutes discharging this patient.    Suzanne Lopez MD

## 2021-10-08 VITALS
DIASTOLIC BLOOD PRESSURE: 75 MMHG | WEIGHT: 184 LBS | RESPIRATION RATE: 16 BRPM | HEIGHT: 70 IN | BODY MASS INDEX: 26.34 KG/M2 | TEMPERATURE: 97.8 F | SYSTOLIC BLOOD PRESSURE: 141 MMHG | HEART RATE: 80 BPM | OXYGEN SATURATION: 98 %

## 2021-10-08 LAB — SARS-COV-2 RNA RESP QL NAA+PROBE: NEGATIVE

## 2021-10-08 PROCEDURE — 250N000013 HC RX MED GY IP 250 OP 250 PS 637: Performed by: PHYSICIAN ASSISTANT

## 2021-10-08 PROCEDURE — 250N000013 HC RX MED GY IP 250 OP 250 PS 637: Performed by: INTERNAL MEDICINE

## 2021-10-08 PROCEDURE — 99239 HOSP IP/OBS DSCHRG MGMT >30: CPT | Performed by: INTERNAL MEDICINE

## 2021-10-08 PROCEDURE — 87635 SARS-COV-2 COVID-19 AMP PRB: CPT | Performed by: INTERNAL MEDICINE

## 2021-10-08 PROCEDURE — 250N000013 HC RX MED GY IP 250 OP 250 PS 637: Performed by: STUDENT IN AN ORGANIZED HEALTH CARE EDUCATION/TRAINING PROGRAM

## 2021-10-08 RX ADMIN — FOLIC ACID 1 MG: 1 TABLET ORAL at 08:58

## 2021-10-08 RX ADMIN — TAMSULOSIN HYDROCHLORIDE 0.4 MG: 0.4 CAPSULE ORAL at 08:58

## 2021-10-08 RX ADMIN — THIAMINE HCL TAB 100 MG 100 MG: 100 TAB at 08:59

## 2021-10-08 RX ADMIN — Medication 25 MCG: at 08:58

## 2021-10-08 RX ADMIN — Medication 1 TABLET: at 12:24

## 2021-10-08 RX ADMIN — LISINOPRIL 10 MG: 10 TABLET ORAL at 09:01

## 2021-10-08 RX ADMIN — POLYETHYLENE GLYCOL 3350 17 G: 17 POWDER, FOR SOLUTION ORAL at 08:54

## 2021-10-08 RX ADMIN — MULTIPLE VITAMINS W/ MINERALS TAB 1 TABLET: TAB at 08:58

## 2021-10-08 RX ADMIN — Medication 1 TABLET: at 08:58

## 2021-10-08 RX ADMIN — SENNOSIDES AND DOCUSATE SODIUM 1 TABLET: 50; 8.6 TABLET ORAL at 08:54

## 2021-10-08 ASSESSMENT — ACTIVITIES OF DAILY LIVING (ADL)
ADLS_ACUITY_SCORE: 9

## 2021-10-08 NOTE — PLAN OF CARE
Physical Therapy Discharge Summary     Reason for therapy discharge:    Discharged to transitional care facility.     Progress towards therapy goal(s). See goals on Care Plan in [facility] electronic health record for goal details.  Goals not met.  Barriers to achieving goals:   discharge from facility     Therapy recommendation(s):    Continued therapy is recommended.  Rationale/Recommendations:  Pt is below baseline with functional mobility and strength and would benefit from continued PT to progress skills.

## 2021-10-08 NOTE — PLAN OF CARE
Patient is pleasantly confused. Patient continues to  Forget to use the call light and turns the bed alarm trying to get up by himself.  SBA with [place] belt and walker. Dressing on the back CDI. Voiding. Plan is to discharge today. Will kanchan to monitor.

## 2021-10-08 NOTE — PLAN OF CARE
Care Management Discharge Note    Discharge Date: 10/08/2021  Expected Time of Departure: Discharging today 10/7/21 to [facility] via [facility] wheelchair @ 245    Discharge Disposition: Transitional Care    Discharge Services: None    Discharge DME: None    Discharge Transportation: agency    Private pay costs discussed: private room/amenity fees and transportation costs    PAS Confirmation Code:  [id number]  Patient/family educated on Medicare website which has current facility and service quality ratings: yes Reviewed out of pocket cost for [facility] transport, $78.65 for base rate and $5.06 per mile to the destination. Pt/family expressed understanding and are agreeable to this.      Education Provided on the Discharge Plan:  yes  Persons Notified of Discharge Plans: Patient and wife  Patient/Family in Agreement with the Plan: yes    Handoff Referral Completed: No    Additional Information:  Met with Armen and updated him on his discharge plan. He was able to tell me his plan and he was reported that he was comfortable with plan. Updated wife who is out of town right now.    Orders faxed and confirmed with facility. Unit notified that patient is ready to go. Insurance had authorized.    Wheelchair  at 1pm.    GOSIA Meng   Inpatient Care Coordination   Supervisor  [facility]  [phone]        MACKENZIE Patel

## 2021-10-08 NOTE — PLAN OF CARE
Pt was cleared by insurance to discharge to rehab. Pts wife visited prior to discharge and was informed of the discharge. Pt left to [facility] via [facility] transportation at 1315. Pt all belongings were packed up prior to discharge. Pt had his home dose of tylenol in his room and the bottle was sent to rehab with the patient.

## 2021-10-08 NOTE — DISCHARGE SUMMARY
[facility]  Discharge Summary  Name: Armen Phelps    MRN: [id number]  YOB: 1945    Age: 76 year old  Date of Discharge:  10/08/21   Date of Admission: 9/23/2021  Primary Care Provider: Gerald Diggs  Discharge Physician:  Suzanne Lopez MD  Discharging Service:  Hospitalist      Discharge Diagnoses:  1. Lumbar surgery  2. Alcohol use disorder with severe alcohol withdrawal and delirium  3. HTN  4. Thrombocytopenia  5. HLD  6. Hypokalemia  7. GERD  8. Non severe malnutrition     Follow-ups Needed After Discharge   Follow up with ortho  Follow up with PCP upon discharge from TCU    Unresulted Labs Ordered in the Past 30 Days of this Admission     No orders found from 10/16/2018 to 12/16/2018.        Hospital Course:  Armen Phelps is a 76 year old male with past medical history of hypertension, hyperlipidemia, GERD, and alcohol dependence who was admitted on 9/23/2021 after elective L1-2 posterior lumbar fusion with revision decompression and discectomy.  Medicine Team consulted for comanagement.     Mr. Phelps is a daily alcohol user.  He went into severe withdrawal in the evening of 9/23-9/24. He was initially on CIWA protocol but this has now been discontinued as his withdrawal symptoms resolved. He completed high-dose IV thiamine given persistent encephalopathy. Now improved. He has been having intermittent impulsiveness. He will discharge to TCU.    Delirium  Acute Alcohol use with Severe withdrawal - Resolved:   -Severe withdrawal started on the evening of 9/23-9/24.  He has been placed on CIWA protocol.  CIWA now discontinued. He is no longer impulsive at this moment. Has been without a sitter for more than 72 hours prior to discharge.  -Strict alcohol cessation moving forward - previously discussed   -Completed high dose thiamine, now on thiamine 100 daily  -Patient's wife unable to take him home as she did not feel he would be safe and would want to leave to get liquor.  "  - consulted for discharge to TCU, continue therapies at TCU     Status post L1-L2 fusion revision decompression and discectomy: Underwent surgery on 9/23.  Tolerated well.  -Postop pain management, anticoagulation, and physical therapy per primary surgical team  -Discharge to TCU     Hypertension: Prior to admission patient was on lisinopril but this was discontinued due to renal insufficiency which subsequently resolved.  Continue PTA Lisinopril.     Thrombocytopenia, resolved: Suspect secondary to alcohol use. Monitor. No evidence of bleeding. Hgb stable.     Hyperlipidemia: Continue statin     Hypokalemia, resolved: Replacement protocol     GERD: Continue PPI    Non Severe Malnutrition: Nutrition following.     Discharge Disposition:  Discharged to short-term care facility     Allergies:  Allergies   Allergen Reactions     Oxycodone Other (See Comments)     9/2021 oxycodone post op patient more confused, agitated, and angry after dose given on 2 occassions     Adhesive Tape Hives and Rash     Developed open sores after last spine surgery.        Condition on Discharge:  Discharge condition: Stable   Discharge vitals: Blood pressure 116/64, pulse 73, temperature 97.5  F (36.4  C), temperature source Temporal, resp. rate 16, height 1.778 m (5' 10\"), weight 83.5 kg (184 lb), SpO2 99 %.   Code status on discharge: Full Code     History of Illness:  See detailed admission note for full details.    Physical Exam:  Blood pressure 116/64, pulse 73, temperature 97.5  F (36.4  C), temperature source Temporal, resp. rate 16, height 1.778 m (5' 10\"), weight 83.5 kg (184 lb), SpO2 99 %.  Wt Readings from Last 1 Encounters:   09/23/21 83.5 kg (184 lb)     General: Alert, awake, no acute distress. Sitting up in a chair eating breakfast.  HEENT: Normocephalic and atraumatic, eyes anicteric and without scleral injection, EOMI, face symmetric, MMM.  Cardiac: RRR, normal S1, S2. No m/g/r, no LE edema.  Pulmonary: " Normal chest rise, normal work of breathing.  Lungs CTAB without crackles or wheezing.  Abdomen: soft, non-tender, non-distended.  Normoactive bowel sounds, no guarding or rebound tenderness.  Extremities: no deformities.  Warm, well perfused.  Skin: no rashes or lesions.  Warm and Dry.  Neuro: No focal deficits.  Speech clear.  Coordination and strength grossly normal.  Psych: Alert and oriented x3. Appropriate affect.    Procedures other than Imaging:  Lumbar surgery     Imaging:  Results for orders placed or performed during the hospital encounter of 09/23/21   XR Surgery ROBYN L/T 5 Min Fluoro w Stills    Narrative    This exam was marked as non-reportable because it will not be read by a   radiologist or a [facility] non-radiologist provider.         XR Surgery ROBYN L/T 5 Min Fluoro w Stills    Narrative    This exam was marked as non-reportable because it will not be read by a   radiologist or a [facility] non-radiologist provider.                Consultations:  Consultations This Hospital Stay   HOSPITALIST IP CONSULT  OCCUPATIONAL THERAPY ADULT IP CONSULT  PHYSICAL THERAPY ADULT IP CONSULT  CARE MANAGEMENT / SOCIAL WORK IP CONSULT  CARE MANAGEMENT / SOCIAL WORK IP CONSULT  OCCUPATIONAL THERAPY ADULT IP CONSULT  PHARMACY IP CONSULT  NEUROLOGY IP CONSULT  PHYSICAL THERAPY ADULT IP CONSULT  OCCUPATIONAL THERAPY ADULT IP CONSULT     Recent Lab Results:  Recent Labs   Lab 10/03/21  0636   WBC 3.6*   HGB 11.5*   HCT 36.1*   *        Recent Labs   Lab 10/03/21  0636      POTASSIUM 3.9   CHLORIDE 110*   CO2 27   ANIONGAP 5   GLC 92   BUN 7   CR 0.90   GFRESTIMATED 83   DESTINEE 9.3          Pending Results:    Unresulted Labs Ordered in the Past 30 Days of this Admission     Date and Time Order Name Status Description    9/23/2021  8:36 AM Fungal or Yeast Culture Routine Preliminary            Discharge Instructions and Follow-Up:   Discharge Orders      Anti-Embolism Stockings    Bilateral below knee  "length.On in the morning, off at night     Care Coordination Referral      Reason for your hospital stay    L1-L2 posterior lumbar instrumented fusion     When to call - Contact Surgeon Team    You may experience symptoms that require follow-up before your scheduled appointment. Refer to the \"Stoplight Tool\" for instructions on when to contact your Surgeon Team if you are concerned about pain control, blood clots, constipation, or if you are unable to urinate.     When to call - Reach out to Urgent Care    If you are not able to reach your Surgeon Team and you need immediate care, go to the Orthopedic Walk-in Clinic or Urgent Care at your Surgeon's office.  Do NOT go to the Emergency Room unless you have shortness of breath, chest pain, or other signs of a medical emergency.     When to call - Reasons to Call 911    Call 911 immediately if you experience sudden-onset chest pain, arm weakness/numbness, slurred speech, or shortness of breath     Discharge Instruction - Breathing exercises    Perform breathing exercises using your Incentive Spirometer 10 times per hour while awake for 2 weeks.     Symptoms - Fever Management    A low grade fever can be expected after surgery.  Use acetaminophen (TYLENOL) as needed for fever management.  Contact your Surgeon Team if you have a fever greater than 101.5 F, chills, and/or night sweats.     Symptoms - Constipation management    Constipation (hard, dry bowel movements) is expected after surgery due to the combination of being less active, the anesthetic, and the opioid pain medication.  You can do the following to help reduce constipation:  ~  FLUIDS:  Drink clear liquids (water or Gatorade), or juice (apple/prune).  ~  DIET:  Eat a fiber rich diet.    ~  ACTIVITY:  Get up and move around several times a day.  Increase your activity as you are able.  MEDICATIONS:  Reduce the risk of constipation by starting medications before you are constipated.  You can take Miralax   (1 " packet as directed) and/or a stool softener (Senokot 1-2 tablets 1-2 times a day).  If you already have constipation and these medications are not working, you can get magnesium citrate and use as directed.  If you continue to have constipation you can try an over the counter suppository or enema.  Call your Surgeon Team if it has been greater than 3 days since your last bowel movement.     Symptoms - Reduced Urine Output    Changes in the amount of fluids you drank before and after surgery may result in problems urinating.  It is important to stay well-hydrated after surgery and drink plenty of water. If it has been greater than 8 hours since you have urinated despite drinking plenty of water, call your Surgeon Team.     Comfort and Pain Management - Pain after Surgery    Pain after surgery is normal and expected.  You will have some amount of pain for several weeks after surgery.  Your pain will improve with time.  There are several things you can do to help reduce your pain including: rest, ice, elevation, and using pain medications as needed. Contact your Surgeon Team if you have pain that persists or worsens after surgery despite rest, ice, elevation, and taking your medication(s) as prescribed. Contact your Surgeon Team if you have new numbness, tingling, or weakness in your operative extremity.     Comfort and Pain Management - Swelling after Surgery    Swelling and/or bruising of the surgical extremity is common and may persist for several months after surgery. In addition to frequent icing and elevation, gentle compressive support with an ACE wrap or tubigrip may help with swelling. Apply compression regularly, removing at least twice daily to perform skin checks. Contact your Surgeon Team if your swelling increases and is NOT associated with an increase in your activity level, or if your swelling increases and is associated with redness and pain.     Comfort and Pain Management - Cold therapy    Ice can be  used to control swelling and discomfort after surgery. Place a thin towel over your operative site and apply the ice pack overtop. Leave ice pack in place for 20 minutes, then remove for 20 minutes. Repeat this 20 minutes on/20 minutes off routine as often as tolerated.     Medication Instructions - Acetaminophen (TYLENOL) Instructions    You were discharged with acetaminophen (TYLENOL) for pain management after surgery. Acetaminophen most effectively manages pain symptoms when it is taken on a schedule without missing doses (every four, six, or eight hours). Your Provider will prescribe a safe daily dose between 3000 - 4000 mg.  Do NOT exceed this daily dose. Most patients use acetaminophen for pain control for the first four weeks after surgery.  You can wean from this medication as your pain decreases.     Medication Instructions - Opioid Instructions (1 - 2 tablets Q 4-6 hours, MAX 6 tablets)    You were discharged with an opioid medication (hydromorphone, oxycodone, hydrocodone, or tramadol). This medication should only be taken for breakthrough pain that is not controlled with acetaminophen (TYLENOL). If you rate your pain less than 3 you do not need this medication.  Pain rating 0-3:  You do not need this medication.  Pain rating 4-6:  Take 1 tablet every 4-6 hours as needed  Pain rating 7-10:  Take 2 tablets every 4-6 hours as needed.  Do not exceed 6 tablets per day     Medication Instructions - Opioids - Tapering Instructions    In the first three days following surgery, your symptoms may warrant use of the narcotic pain medication every four to six hours as prescribed. This is normal. As your pain symptoms improve, focus your efforts on decreasing (tapering) use of narcotic medications. The most successful tapering strategy is to first, decrease the number of tablets you take every 4-6 hours to the minimum prescribed. Then, increase the amount of time between doses.  For example:  First, taper to   or 1  "tablet every 4-6 hours.  Then, taper to   or 1 tablet every 6-8 hours.  Then, taper to   or 1 tablet every 8-10 hours.  Then, taper to   or 1 tablet every 10-12 hours.  Then, taper to   or 1 tablet at bedtime.  The bedtime dose can help with comfort during sleep and is typically the last dose to be discontinued after surgery.     No VTE Prophylaxis     Comfort and Pain Management - LOWER Extremity Elevation    Swelling is expected for several months after surgery. This type of swelling is usually associated with gravity and activity, and can be improved with elevation.   The best way to do this is to get your \"toes above your nose\" by laying down and placing several pillows lengthwise under your calf and heel. When elevating your leg keep your knee completely straight. Perform this elevation as often as possible especially for the first two weeks after surgery.     General info for SNF    Length of Stay Estimate: Short Term Care: Estimated # of Days <30  Condition at Discharge: Improving  Level of care:skilled   Rehabilitation Potential: Good  Admission H&P remains valid and up-to-date: Yes  Recent Chemotherapy: N/A  Use Nursing Home Standing Orders: Yes     Mantoux instructions    Give two-step Mantoux (PPD) Per Facility Policy Yes     Follow Up and recommended labs and tests    Follow up with Wilder Guy PA-C in two weeks at [facility]. Please call Jeremiah [phone] to schedule.     Reason for your hospital stay    L1-2 posterior lumbar instrumented fusion     Wound care (specify)    Site: Lumbar  Instructions:  Your incision is covered with an Aquacel dressing. This is a waterproof dressing that you are able to shower with. Do not submerge your dressing in water. Do not remove dressing until you are seen in clinic for your two week post op visit.     However, if you notice a significant amount of drainage on your dressing, or there is any concern for possible incision infection, remove to " inspect the incision.    If you do remove the dressing prior to your two week visit, please cover with simple dressing. We suggest a folded piece of gauze with a few pieces of tape to hold in place. This will allow the incision to remain protected. Please make sure to cover your dressing with plastic/waterproof dressing to keep it waterproof if you have removed the initial dressing while in the shower. We ask that you continue to waterproof it until you are seen at your two week post op appointment.     Activity - Up with nursing assistance     Follow Up Care    Follow up with Wilder Guy PA-C in two weeks at [facility]. Please call [phone] to schedule.    You will need follow-up with neurology in about 4 weeks. They should call you to schedule an appointment, however, if they do not, please reach out to your neurology clinic for follow-up.     Full Code    Full code     Physical Therapy Adult Consult    Evaluate and treat as clinically indicated.    Reason:  Status post L1-2 posterior lumbar fusion     Occupational Therapy Adult Consult    Evaluate and treat as clinically indicated.    Reason:  Status post L1-2 posterior lumbar fusion     Fall precautions     Discharge Instruction - Regular Diet Adult    Return to your pre-surgery diet unless instructed otherwise     Advance Diet as Tolerated    Follow this diet upon discharge: Regular     Discharge Medications   Current Discharge Medication List      START taking these medications    Details   !! acetaminophen (TYLENOL) 325 MG tablet Take 2 tablets (650 mg) by mouth every 6 hours as needed for other (mild pain)  Qty: 100 tablet, Refills: 0    Associated Diagnoses: S/P lumbar fusion      calcium carbonate-vitamin D (OSCAL W/D) 500-200 MG-UNIT tablet Take 1 tablet by mouth 3 times daily (with meals)  Qty: 270 tablet, Refills: 0    Associated Diagnoses: S/P lumbar fusion      donepezil (ARICEPT) 5 MG tablet Take 1 tablet (5 mg) by mouth At  Bedtime    Associated Diagnoses: Cognitive deficits as late effect of cerebrovascular disease      folic acid (FOLVITE) 1 MG tablet Take 1 tablet (1 mg) by mouth daily    Associated Diagnoses: Alcohol use disorder, mild, abuse      hydrOXYzine (ATARAX) 10 MG tablet Take 1 tablet (10 mg) by mouth every 6 hours as needed for itching or anxiety  Qty: 30 tablet, Refills: 0    Associated Diagnoses: S/P lumbar fusion      lisinopril (ZESTRIL) 10 MG tablet Take 1 tablet (10 mg) by mouth daily    Associated Diagnoses: Benign essential hypertension      senna-docusate (SENOKOT-S/PERICOLACE) 8.6-50 MG tablet Take 1-2 tablets by mouth 2 times daily Take while on oral narcotics to prevent or treat constipation.  Qty: 30 tablet, Refills: 0    Comments: While taking narcotics  Associated Diagnoses: S/P lumbar fusion      tamsulosin (FLOMAX) 0.4 MG capsule Take 1 capsule (0.4 mg) by mouth daily    Associated Diagnoses: Benign prostatic hyperplasia without lower urinary tract symptoms      thiamine (B-1) 100 MG tablet Take 1 tablet (100 mg) by mouth daily    Associated Diagnoses: Alcohol use disorder, mild, abuse       !! - Potential duplicate medications found. Please discuss with provider.      CONTINUE these medications which have CHANGED    Details   Vitamin D3 (CHOLECALCIFEROL) 25 mcg (1000 units) tablet Take 1 tablet (25 mcg) by mouth 2 times daily  Qty: 180 tablet, Refills: 0    Associated Diagnoses: S/P lumbar fusion         CONTINUE these medications which have NOT CHANGED    Details   !! acetaminophen (TYLENOL) 325 MG tablet Take 325-650 mg by mouth every 6 hours as needed for mild pain      cyanocobalamin (CYANOCOBALAMIN) 1000 MCG/ML injection Inject 1 mL (1,000 mcg) into the muscle every 21 days      Cyanocobalamin (VITAMIN B 12 PO) Take 2,500 mcg by mouth daily      melatonin 5 MG tablet Take 1 tablet (5 mg) by mouth At Bedtime    Associated Diagnoses: Cognitive deficits as late effect of cerebrovascular disease       multivitamin w/minerals (MULTI-VITAMIN) tablet Take 2 tablets by mouth daily       ondansetron (ZOFRAN-ODT) 4 MG ODT tab Take 1 tablet (4 mg) by mouth every 8 hours as needed for nausea  Qty: 90 tablet, Refills: 3    Associated Diagnoses: Hereditary hemochromatosis (H)      pantoprazole (PROTONIX) 40 MG EC tablet Take 1 tablet (40 mg) by mouth every evening  Qty: 90 tablet, Refills: 1    Associated Diagnoses: Essential hypertension      Probiotic Product (PROBIOTIC-10 PO) Take 1 capsule by mouth daily       QUEtiapine (SEROQUEL) 25 MG tablet Take 1 tablet (25 mg) by mouth At Bedtime  Qty: 90 tablet, Refills: 1    Associated Diagnoses: Sundowning      testosterone cypionate (DEPOTESTOSTERONE) 100 MG/ML injection Inject 50 mg into the muscle every 21 days Last dose 9/9/21      simvastatin (ZOCOR) 80 MG tablet TAKE ONE TABLET BY MOUTH IN THE EVENING   Qty: 90 tablet, Refills: 0    Associated Diagnoses: Mixed hyperlipidemia       !! - Potential duplicate medications found. Please discuss with provider.          Time Spent on this Encounter   I, Suzanne Lopez MD, personally saw the patient today and spent greater than 30 minutes discharging this patient.    Suzanne Lopez MD

## 2021-10-13 ENCOUNTER — DOCUMENTATION ONLY (OUTPATIENT)
Dept: OTHER | Facility: CLINIC | Age: 76
End: 2021-10-13

## 2021-10-13 PROBLEM — Z71.89 ACP (ADVANCE CARE PLANNING): Status: RESOLVED | Noted: 2020-06-17 | Resolved: 2021-10-13

## 2021-10-18 ENCOUNTER — DOCUMENTATION ONLY (OUTPATIENT)
Dept: OTHER | Facility: CLINIC | Age: 76
End: 2021-10-18

## 2021-10-21 LAB — BACTERIA WND CULT: NO GROWTH

## 2021-11-01 ENCOUNTER — TRANSFERRED RECORDS (OUTPATIENT)
Dept: FAMILY MEDICINE | Facility: CLINIC | Age: 76
End: 2021-11-01

## 2021-11-08 ENCOUNTER — TELEPHONE (OUTPATIENT)
Dept: FAMILY MEDICINE | Facility: CLINIC | Age: 76
End: 2021-11-08

## 2021-11-08 NOTE — TELEPHONE ENCOUNTER
Patients wife called asking about referrals for patient. Stated that patient is in memory care at this time. Is needing Neurological Testing done. Patient is scheduled 11/19/2021 to see Dr. Perry Medrano with     [facility]  [address]  [place] [address]  [phone] -- appt line  [phone] -- fax    Cam, patients wife said that she was advised from memory care to call PCP. Cam said that she is not sure who patient needs to see to get a neurological dx. I went over Cma that patient will have to see Neurology for neurology dx & neurology testing. I advised her to call Joselin to ask what the 11/19/2021 appt is for. Is it a consult or for the neurological testing that is needed.  Cam, patients wife will call back if she has additional questions.

## 2021-11-22 ENCOUNTER — TRANSFERRED RECORDS (OUTPATIENT)
Dept: FAMILY MEDICINE | Facility: CLINIC | Age: 76
End: 2021-11-22

## 2021-11-23 ENCOUNTER — TELEPHONE (OUTPATIENT)
Dept: FAMILY MEDICINE | Facility: CLINIC | Age: 76
End: 2021-11-23

## 2021-11-24 ENCOUNTER — OFFICE VISIT (OUTPATIENT)
Dept: FAMILY MEDICINE | Facility: CLINIC | Age: 76
End: 2021-11-24

## 2021-11-24 VITALS
OXYGEN SATURATION: 98 % | SYSTOLIC BLOOD PRESSURE: 122 MMHG | BODY MASS INDEX: 26.98 KG/M2 | DIASTOLIC BLOOD PRESSURE: 78 MMHG | WEIGHT: 188 LBS | RESPIRATION RATE: 20 BRPM | HEART RATE: 80 BPM | TEMPERATURE: 98 F

## 2021-11-24 DIAGNOSIS — E78.2 MIXED HYPERLIPIDEMIA: ICD-10-CM

## 2021-11-24 DIAGNOSIS — F03.90 DEMENTIA WITHOUT BEHAVIORAL DISTURBANCE, UNSPECIFIED DEMENTIA TYPE: ICD-10-CM

## 2021-11-24 DIAGNOSIS — I10 ESSENTIAL HYPERTENSION: ICD-10-CM

## 2021-11-24 DIAGNOSIS — E83.110 HEREDITARY HEMOCHROMATOSIS (H): ICD-10-CM

## 2021-11-24 DIAGNOSIS — Z98.1 S/P LUMBAR FUSION: Primary | ICD-10-CM

## 2021-11-24 DIAGNOSIS — M25.562 ACUTE PAIN OF LEFT KNEE: ICD-10-CM

## 2021-11-24 PROCEDURE — 99214 OFFICE O/P EST MOD 30 MIN: CPT | Performed by: FAMILY MEDICINE

## 2021-11-24 RX ORDER — ROSUVASTATIN CALCIUM 20 MG/1
TABLET, COATED ORAL
COMMUNITY
Start: 2021-11-05 | End: 2021-12-14

## 2021-11-24 RX ORDER — GAUZE BANDAGE 2" X 2"
100 BANDAGE TOPICAL DAILY
COMMUNITY
Start: 2021-11-05

## 2021-11-24 RX ORDER — MULTIVITAMIN WITH FOLIC ACID 400 MCG
TABLET ORAL
COMMUNITY
Start: 2021-11-05

## 2021-11-24 RX ORDER — CALCIUM CARBONATE/VITAMIN D3 600 MG-10
250 TABLET ORAL DAILY
COMMUNITY
Start: 2021-11-05 | End: 2021-11-24

## 2021-11-24 NOTE — PROGRESS NOTES
"  Assessment & Plan     S/P lumbar fusion  Doing well, following with Dr Lindsay    Mixed hyperlipidemia  Control uncertain, Continue to work on healthy diet and exercise, discussed healthy habits     Essential hypertension  Well controlled, continue current medications at current doses     Dementia without behavioral disturbance, unspecified dementia type (H)  Reviewed neurology notes, plan for neuropsych testing    Acute pain of left knee  Likely DJD, recommend he see ortho -wife will call [facility]    Hereditary hemochromatosis (H)  Has not seen Dr Preston since 1/20- recommend f/u      Review of external notes as documented elsewhere in note  Review of the result(s) of each unique test - studies and labs in rehab  Prescription drug management         BMI:   Estimated body mass index is 26.98 kg/m  as calculated from the following:    Height as of 9/23/21: 1.778 m (5' 10\").    Weight as of this encounter: 85.3 kg (188 lb).       FUTURE APPOINTMENTS:       - Follow-up visit in 6 mo  Regular exercise    No follow-ups on file.    Gerald Diggs MD  [facility]    Ivania Ayers is a 76 year old who presents for the following health issues  accompanied by his spouse.    [facility]       Hospital Follow-up Visit:    Hospital/Nursing Home/IP Rehab Facility: [facility]  Date of Admission: 9/23/2021  Date of Discharge: 10/8/2021 then transferred to memory care after   Reason(s) for Admission: Lumbar surgery, HTN, HLD, thrombocytopenia, GERD, hypokalemia,, non severe malnutrition       Was your hospitalization related to COVID-19? No   Problems taking medications regularly:  None  Medication changes since discharge: was put on tylenol, calcium with vitamin d, donepezil, folic acid, hydroxyzine, lisinopril, senna, tamsulosin, thiamine,   Problems adhering to non-medication therapy:  None    Summary of hospitalization:  [facility] discharge summary reviewed  Diagnostic " Tests/Treatments reviewed.  Follow up needed: neuropsych 12/29/21, recent neurology visit 11/15 reviewed  Other Healthcare Providers Involved in Patient s Care:         Specialist appointment - neuropsych, spine surgery, general pratitioner at [facility]  Update since discharge: improved.-eating well, stooling Post Discharge Medication Reconciliation: discharge medications reconciled, continue medications without change.  Plan of care communicated with patient and family              Review of Systems   Constitutional, HEENT, cardiovascular, pulmonary, gi and gu systems are negative, except as otherwise noted.      Objective    There were no vitals taken for this visit.  There is no height or weight on file to calculate BMI.  Physical Exam   GENERAL: healthy, alert and no distress  NECK: no adenopathy, no asymmetry, masses, or scars and thyroid normal to palpation  RESP: lungs clear to auscultation - no rales, rhonchi or wheezes  CV: regular rate and rhythm, normal S1 S2, no S3 or S4, no murmur, click or rub, no peripheral edema and peripheral pulses strong  ABDOMEN: soft, nontender, no hepatosplenomegaly, no masses and bowel sounds normal  MS: no gross musculoskeletal defects noted, no edema  PSYCH: affect normal/bright and still memory issues    Admission on 09/23/2021, Discharged on 10/08/2021   Component Date Value Ref Range Status     ABO/RH(D) 09/23/2021 A POS   Final     Antibody Screen 09/23/2021 Negative  Negative Final     SPECIMEN EXPIRATION DATE 09/23/2021 [id number]   Final     Culture 09/23/2021 No anaerobic organisms isolated   Final     Gram Stain Result 09/23/2021 No organisms seen   Final     Gram Stain Result 09/23/2021 1+ WBC seen   Final     Culture 09/23/2021 No Growth   Final     Culture 09/23/2021 No Growth   Final     Magnesium 09/23/2021 1.8  1.6 - 2.3 mg/dL Final     Phosphorus 09/23/2021 2.9  2.5 - 4.5 mg/dL Final     Hemoglobin 09/24/2021 12.5* 13.3 - 17.7 g/dL Final     Sodium 09/24/2021  140  133 - 144 mmol/L Final     Potassium 09/24/2021 3.9  3.4 - 5.3 mmol/L Final     Chloride 09/24/2021 109  94 - 109 mmol/L Final     Carbon Dioxide (CO2) 09/24/2021 23  20 - 32 mmol/L Final     Anion Gap 09/24/2021 8  3 - 14 mmol/L Final     Urea Nitrogen 09/24/2021 8  7 - 30 mg/dL Final     Creatinine 09/24/2021 0.72  0.66 - 1.25 mg/dL Final     Calcium 09/24/2021 8.7  8.5 - 10.1 mg/dL Final     Glucose 09/24/2021 113* 70 - 99 mg/dL Final     GFR Estimate 09/24/2021 >90  >60 mL/min/1.73m2 Final    As of July 11, 2021, eGFR is calculated by the CKD-EPI creatinine equation, without race adjustment. eGFR can be influenced by muscle mass, exercise, and diet. The reported eGFR is an estimation only and is only applicable if the renal function is stable.     Hemoglobin 09/25/2021 10.8* 13.3 - 17.7 g/dL Final     Sodium 09/25/2021 141  133 - 144 mmol/L Final     Potassium 09/25/2021 3.3* 3.4 - 5.3 mmol/L Final     Chloride 09/25/2021 111* 94 - 109 mmol/L Final     Carbon Dioxide (CO2) 09/25/2021 26  20 - 32 mmol/L Final     Anion Gap 09/25/2021 4  3 - 14 mmol/L Final     Urea Nitrogen 09/25/2021 6* 7 - 30 mg/dL Final     Creatinine 09/25/2021 0.70  0.66 - 1.25 mg/dL Final     Calcium 09/25/2021 8.3* 8.5 - 10.1 mg/dL Final     Glucose 09/25/2021 96  70 - 99 mg/dL Final     GFR Estimate 09/25/2021 >90  >60 mL/min/1.73m2 Final    As of July 11, 2021, eGFR is calculated by the CKD-EPI creatinine equation, without race adjustment. eGFR can be influenced by muscle mass, exercise, and diet. The reported eGFR is an estimation only and is only applicable if the renal function is stable.     Potassium 09/25/2021 3.8  3.4 - 5.3 mmol/L Final     Magnesium 09/26/2021 1.7  1.6 - 2.3 mg/dL Final     Sodium 09/26/2021 140  133 - 144 mmol/L Final     Potassium 09/26/2021 3.2* 3.4 - 5.3 mmol/L Final     Chloride 09/26/2021 106  94 - 109 mmol/L Final     Carbon Dioxide (CO2) 09/26/2021 28  20 - 32 mmol/L Final     Anion Gap 09/26/2021 6   3 - 14 mmol/L Final     Urea Nitrogen 09/26/2021 7  7 - 30 mg/dL Final     Creatinine 09/26/2021 0.70  0.66 - 1.25 mg/dL Final     Calcium 09/26/2021 8.7  8.5 - 10.1 mg/dL Final     Glucose 09/26/2021 99  70 - 99 mg/dL Final     Alkaline Phosphatase 09/26/2021 49  40 - 150 U/L Final     AST 09/26/2021 23  0 - 45 U/L Final     ALT 09/26/2021 16  0 - 70 U/L Final     Protein Total 09/26/2021 6.2* 6.8 - 8.8 g/dL Final     Albumin 09/26/2021 2.9* 3.4 - 5.0 g/dL Final     Bilirubin Total 09/26/2021 1.0  0.2 - 1.3 mg/dL Final     GFR Estimate 09/26/2021 >90  >60 mL/min/1.73m2 Final    As of July 11, 2021, eGFR is calculated by the CKD-EPI creatinine equation, without race adjustment. eGFR can be influenced by muscle mass, exercise, and diet. The reported eGFR is an estimation only and is only applicable if the renal function is stable.     WBC Count 09/26/2021 5.9  4.0 - 11.0 10e3/uL Final     RBC Count 09/26/2021 3.32* 4.40 - 5.90 10e6/uL Final     Hemoglobin 09/26/2021 11.7* 13.3 - 17.7 g/dL Final     Hematocrit 09/26/2021 36.4* 40.0 - 53.0 % Final     MCV 09/26/2021 110* 78 - 100 fL Final     MCH 09/26/2021 35.2* 26.5 - 33.0 pg Final     MCHC 09/26/2021 32.1  31.5 - 36.5 g/dL Final     RDW 09/26/2021 15.4* 10.0 - 15.0 % Final     Platelet Count 09/26/2021 125* 150 - 450 10e3/uL Final     Potassium 09/26/2021 3.8  3.4 - 5.3 mmol/L Final     Sodium 09/27/2021 140  133 - 144 mmol/L Final     Potassium 09/27/2021 3.7  3.4 - 5.3 mmol/L Final     Chloride 09/27/2021 107  94 - 109 mmol/L Final     Carbon Dioxide (CO2) 09/27/2021 27  20 - 32 mmol/L Final     Anion Gap 09/27/2021 6  3 - 14 mmol/L Final     Urea Nitrogen 09/27/2021 10  7 - 30 mg/dL Final     Creatinine 09/27/2021 0.79  0.66 - 1.25 mg/dL Final     Calcium 09/27/2021 8.7  8.5 - 10.1 mg/dL Final     Glucose 09/27/2021 100* 70 - 99 mg/dL Final     GFR Estimate 09/27/2021 87  >60 mL/min/1.73m2 Final    As of July 11, 2021, eGFR is calculated by the CKD-EPI creatinine  equation, without race adjustment. eGFR can be influenced by muscle mass, exercise, and diet. The reported eGFR is an estimation only and is only applicable if the renal function is stable.     Magnesium 09/27/2021 2.0  1.6 - 2.3 mg/dL Final     WBC Count 09/27/2021 5.1  4.0 - 11.0 10e3/uL Final     RBC Count 09/27/2021 3.23* 4.40 - 5.90 10e6/uL Final     Hemoglobin 09/27/2021 11.4* 13.3 - 17.7 g/dL Final     Hematocrit 09/27/2021 35.5* 40.0 - 53.0 % Final     MCV 09/27/2021 110* 78 - 100 fL Final     MCH 09/27/2021 35.3* 26.5 - 33.0 pg Final     MCHC 09/27/2021 32.1  31.5 - 36.5 g/dL Final     RDW 09/27/2021 15.5* 10.0 - 15.0 % Final     Platelet Count 09/27/2021 137* 150 - 450 10e3/uL Final     Potassium 09/28/2021 3.7  3.4 - 5.3 mmol/L Final     Potassium 09/29/2021 3.7  3.4 - 5.3 mmol/L Final     Sodium 09/30/2021 138  133 - 144 mmol/L Final     Potassium 09/30/2021 4.1  3.4 - 5.3 mmol/L Final     Chloride 09/30/2021 106  94 - 109 mmol/L Final     Carbon Dioxide (CO2) 09/30/2021 27  20 - 32 mmol/L Final     Anion Gap 09/30/2021 5  3 - 14 mmol/L Final     Urea Nitrogen 09/30/2021 8  7 - 30 mg/dL Final     Creatinine 09/30/2021 0.80  0.66 - 1.25 mg/dL Final     Calcium 09/30/2021 9.0  8.5 - 10.1 mg/dL Final     Glucose 09/30/2021 97  70 - 99 mg/dL Final     GFR Estimate 09/30/2021 87  >60 mL/min/1.73m2 Final    As of July 11, 2021, eGFR is calculated by the CKD-EPI creatinine equation, without race adjustment. eGFR can be influenced by muscle mass, exercise, and diet. The reported eGFR is an estimation only and is only applicable if the renal function is stable.     WBC Count 09/30/2021 4.1  4.0 - 11.0 10e3/uL Final     RBC Count 09/30/2021 3.09* 4.40 - 5.90 10e6/uL Final     Hemoglobin 09/30/2021 10.8* 13.3 - 17.7 g/dL Final     Hematocrit 09/30/2021 34.1* 40.0 - 53.0 % Final     MCV 09/30/2021 110* 78 - 100 fL Final     MCH 09/30/2021 35.0* 26.5 - 33.0 pg Final     MCHC 09/30/2021 31.7  31.5 - 36.5 g/dL Final      RDW 09/30/2021 15.3* 10.0 - 15.0 % Final     Platelet Count 09/30/2021 166  150 - 450 10e3/uL Final     SARS CoV2 PCR 09/30/2021 Negative  Negative Final    NEGATIVE: SARS-CoV-2 (COVID-19) RNA not detected, presumed negative.     Vitamin B12 10/02/2021 1,655* 193 - 986 pg/mL Final     Folic Acid 10/02/2021 38.4  >=5.4 ng/mL Final    Deficient: <3.4 ng/mL  Indeterminate: 3.4-5.4 ng/mL  Normal: > 5.4 ng/mL     WBC Count 10/03/2021 3.6* 4.0 - 11.0 10e3/uL Final     RBC Count 10/03/2021 3.28* 4.40 - 5.90 10e6/uL Final     Hemoglobin 10/03/2021 11.5* 13.3 - 17.7 g/dL Final     Hematocrit 10/03/2021 36.1* 40.0 - 53.0 % Final     MCV 10/03/2021 110* 78 - 100 fL Final     MCH 10/03/2021 35.1* 26.5 - 33.0 pg Final     MCHC 10/03/2021 31.9  31.5 - 36.5 g/dL Final     RDW 10/03/2021 14.6  10.0 - 15.0 % Final     Platelet Count 10/03/2021 211  150 - 450 10e3/uL Final     Sodium 10/03/2021 142  133 - 144 mmol/L Final     Potassium 10/03/2021 3.9  3.4 - 5.3 mmol/L Final     Chloride 10/03/2021 110* 94 - 109 mmol/L Final     Carbon Dioxide (CO2) 10/03/2021 27  20 - 32 mmol/L Final     Anion Gap 10/03/2021 5  3 - 14 mmol/L Final     Urea Nitrogen 10/03/2021 7  7 - 30 mg/dL Final     Creatinine 10/03/2021 0.90  0.66 - 1.25 mg/dL Final     Calcium 10/03/2021 9.3  8.5 - 10.1 mg/dL Final     Glucose 10/03/2021 92  70 - 99 mg/dL Final     GFR Estimate 10/03/2021 83  >60 mL/min/1.73m2 Final    As of July 11, 2021, eGFR is calculated by the CKD-EPI creatinine equation, without race adjustment. eGFR can be influenced by muscle mass, exercise, and diet. The reported eGFR is an estimation only and is only applicable if the renal function is stable.     TSH 10/03/2021 2.18  0.40 - 4.00 mU/L Final     SARS CoV2 PCR 10/08/2021 Negative  Negative Final    NEGATIVE: SARS-CoV-2 (COVID-19) RNA not detected, presumed negative.

## 2021-11-24 NOTE — NURSING NOTE
Chief Complaint   Patient presents with     Hospital F/U     follow up from [facility] and from [facility], admitted from 9/23/21-10/8/21, doing better     Pre-visit Screening:  Immunizations:  up to date  Colonoscopy:  is up to date  Mammogram: NA  Asthma Action Test/Plan:  NA  PHQ9:  NA  GAD7:  NA  Questioned patient about current smoking habits Pt. quit smoking some time ago.  Ok to leave detailed message on voice mail for today's visit only Yes, phone # [phone]

## 2021-11-29 ENCOUNTER — TELEPHONE (OUTPATIENT)
Dept: FAMILY MEDICINE | Facility: CLINIC | Age: 76
End: 2021-11-29

## 2021-11-29 DIAGNOSIS — F10.10 ALCOHOL USE DISORDER, MILD, ABUSE: ICD-10-CM

## 2021-11-29 RX ORDER — FOLIC ACID 1 MG/1
1 TABLET ORAL DAILY
Qty: 90 TABLET | Refills: 0 | Status: SHIPPED | OUTPATIENT
Start: 2021-11-29

## 2021-11-29 NOTE — TELEPHONE ENCOUNTER
Armen Phelps is requesting a refill of:    Pending Prescriptions:                       Disp   Refills    folic acid (FOLVITE) 1 MG tablet          90 tab*0            Sig: Take 1 tablet (1 mg) by mouth daily    Please close encounter if RX was sent. Thanks, Halima

## 2021-12-09 ENCOUNTER — TELEPHONE (OUTPATIENT)
Dept: FAMILY MEDICINE | Facility: CLINIC | Age: 76
End: 2021-12-09

## 2021-12-09 DIAGNOSIS — F05 SUNDOWNING: ICD-10-CM

## 2021-12-09 DIAGNOSIS — N40.0 BENIGN PROSTATIC HYPERPLASIA WITHOUT LOWER URINARY TRACT SYMPTOMS: ICD-10-CM

## 2021-12-09 DIAGNOSIS — I69.919 COGNITIVE DEFICITS AS LATE EFFECT OF CEREBROVASCULAR DISEASE: ICD-10-CM

## 2021-12-09 DIAGNOSIS — I10 BENIGN ESSENTIAL HYPERTENSION: ICD-10-CM

## 2021-12-09 RX ORDER — DONEPEZIL HYDROCHLORIDE 5 MG/1
5 TABLET, FILM COATED ORAL AT BEDTIME
Qty: 90 TABLET | Refills: 1 | Status: SHIPPED | OUTPATIENT
Start: 2021-12-09

## 2021-12-09 RX ORDER — QUETIAPINE FUMARATE 25 MG/1
25 TABLET, FILM COATED ORAL AT BEDTIME
Qty: 90 TABLET | Refills: 1 | Status: SHIPPED | OUTPATIENT
Start: 2021-12-09

## 2021-12-09 RX ORDER — LISINOPRIL 10 MG/1
10 TABLET ORAL DAILY
Qty: 90 TABLET | Refills: 1 | Status: SHIPPED | OUTPATIENT
Start: 2021-12-09

## 2021-12-09 RX ORDER — TAMSULOSIN HYDROCHLORIDE 0.4 MG/1
0.4 CAPSULE ORAL DAILY
Qty: 90 CAPSULE | Refills: 0 | Status: SHIPPED | OUTPATIENT
Start: 2021-12-09

## 2021-12-09 NOTE — TELEPHONE ENCOUNTER
Wife dropped off medication list last week.   Left before I could talk to her.   Thinking that she needed refills for her .   He is at [facility]?  Received a refill last week and three more yesterday from [facility] pharmacy    Wife states that the Nursing home has reached out to us without response. I have not seen anything come through besides the refills.     LMOM for Lynn to call back to see exactly what they are needing.

## 2021-12-09 NOTE — TELEPHONE ENCOUNTER
Armen Phelps is requesting a refill of:    Pending Prescriptions:                       Disp   Refills    QUEtiapine (SEROQUEL) 25 MG tablet        90 tab*1            Sig: Take 1 tablet (25 mg) by mouth At Bedtime    tamsulosin (FLOMAX) 0.4 MG capsule        90 cap*0            Sig: Take 1 capsule (0.4 mg) by mouth daily    lisinopril (ZESTRIL) 10 MG tablet         90 tab*1            Sig: Take 1 tablet (10 mg) by mouth daily    donepezil (ARICEPT) 5 MG tablet           90 tab*1            Sig: Take 1 tablet (5 mg) by mouth At Bedtime    Pt needs sent to [facility] pharmacy    Thanks,Halima

## 2021-12-13 DIAGNOSIS — I10 ESSENTIAL HYPERTENSION: ICD-10-CM

## 2021-12-13 RX ORDER — PANTOPRAZOLE SODIUM 40 MG/1
40 TABLET, DELAYED RELEASE ORAL EVERY EVENING
Qty: 90 TABLET | Refills: 1 | Status: SHIPPED | OUTPATIENT
Start: 2021-12-13

## 2021-12-13 NOTE — TELEPHONE ENCOUNTER
Armen Phelps is requesting a refill of:    Pending Prescriptions:                       Disp   Refills    pantoprazole (PROTONIX) 40 MG EC tablet   90 tab*1            Sig: Take 1 tablet (40 mg) by mouth every evening    Please close encounter if RX was sent. Thanks, Halima

## 2021-12-14 DIAGNOSIS — E78.2 MIXED HYPERLIPIDEMIA: Primary | ICD-10-CM

## 2021-12-14 NOTE — TELEPHONE ENCOUNTER
Armne Phelps is requesting a refill of:    Pending Prescriptions:                       Disp   Refills    rosuvastatin (CRESTOR) 20 MG tablet       90 tab*1            Sig: Take 1 tablet (20 mg) by mouth daily

## 2021-12-15 RX ORDER — ROSUVASTATIN CALCIUM 20 MG/1
20 TABLET, COATED ORAL DAILY
Qty: 90 TABLET | Refills: 1 | Status: SHIPPED | OUTPATIENT
Start: 2021-12-15

## 2021-12-20 ENCOUNTER — TRANSFERRED RECORDS (OUTPATIENT)
Dept: FAMILY MEDICINE | Facility: CLINIC | Age: 76
End: 2021-12-20

## 2022-01-18 ENCOUNTER — TRANSFERRED RECORDS (OUTPATIENT)
Dept: FAMILY MEDICINE | Facility: CLINIC | Age: 77
End: 2022-01-18

## 2022-02-08 ENCOUNTER — TRANSFERRED RECORDS (OUTPATIENT)
Dept: FAMILY MEDICINE | Facility: CLINIC | Age: 77
End: 2022-02-08

## 2022-02-14 ENCOUNTER — OFFICE VISIT (OUTPATIENT)
Dept: FAMILY MEDICINE | Facility: CLINIC | Age: 77
End: 2022-02-14

## 2022-02-14 ENCOUNTER — LAB (OUTPATIENT)
Dept: LAB | Facility: CLINIC | Age: 77
End: 2022-02-14
Payer: COMMERCIAL

## 2022-02-14 VITALS
WEIGHT: 196.8 LBS | RESPIRATION RATE: 20 BRPM | HEART RATE: 76 BPM | BODY MASS INDEX: 28.24 KG/M2 | SYSTOLIC BLOOD PRESSURE: 134 MMHG | DIASTOLIC BLOOD PRESSURE: 80 MMHG | TEMPERATURE: 97.2 F

## 2022-02-14 DIAGNOSIS — E78.2 MIXED HYPERLIPIDEMIA: ICD-10-CM

## 2022-02-14 DIAGNOSIS — Z11.52 ENCOUNTER FOR SCREENING FOR COVID-19: ICD-10-CM

## 2022-02-14 DIAGNOSIS — I10 ESSENTIAL HYPERTENSION: ICD-10-CM

## 2022-02-14 DIAGNOSIS — F10.11 HISTORY OF ALCOHOL ABUSE: ICD-10-CM

## 2022-02-14 DIAGNOSIS — Z11.1 SCREENING EXAMINATION FOR PULMONARY TUBERCULOSIS: ICD-10-CM

## 2022-02-14 DIAGNOSIS — R41.89 COGNITIVE DEFICITS: Primary | ICD-10-CM

## 2022-02-14 DIAGNOSIS — Z11.1 SCREENING EXAMINATION FOR PULMONARY TUBERCULOSIS: Primary | ICD-10-CM

## 2022-02-14 DIAGNOSIS — F40.243 FEAR OF FLYING: ICD-10-CM

## 2022-02-14 PROCEDURE — G2023 SPECIMEN COLLECT COVID-19: HCPCS | Performed by: FAMILY MEDICINE

## 2022-02-14 PROCEDURE — 86481 TB AG RESPONSE T-CELL SUSP: CPT

## 2022-02-14 PROCEDURE — 99214 OFFICE O/P EST MOD 30 MIN: CPT | Performed by: FAMILY MEDICINE

## 2022-02-14 PROCEDURE — 36415 COLL VENOUS BLD VENIPUNCTURE: CPT

## 2022-02-14 RX ORDER — LORAZEPAM 0.5 MG/1
0.5 TABLET ORAL EVERY 6 HOURS PRN
Qty: 8 TABLET | Refills: 0 | Status: SHIPPED | OUTPATIENT
Start: 2022-02-14

## 2022-02-14 RX ORDER — CALCIUM CARBONATE/VITAMIN D3 600 MG-10
250 TABLET ORAL DAILY
COMMUNITY
Start: 2022-02-07

## 2022-02-14 NOTE — NURSING NOTE
Armen Phelps is here for orders for TB testing, COVID, and possible anxiety medication for flying. Pt moving to an Assisted living in [place] this week.    Questioned patient about current smoking habits.  Pt. quit smoking some time ago.  PULSE regular  My Chart: active  CLASSIFICATION OF OVERWEIGHT AND OBESITY BY BMI                        Obesity Class           BMI(kg/m2)  Underweight                                    < 18.5  Normal                                         18.5-24.9  Overweight                                     25.0-29.9  OBESITY                     I                  30.0-34.9                             II                 35.0-39.9  EXTREME OBESITY             III                >40                            Patient's  BMI Body mass index is 28.24 kg/m .  http://hin.nhlbi.nih.gov/menuplanner/menu.cgi  Pre-visit planning  Immunizations - up to date  Colonoscopy -   Mammogram -   Asthma -   PHQ9 -    DARLIN-7 -    Hearing Test -

## 2022-02-14 NOTE — PROGRESS NOTES
Assessment & Plan     Cognitive deficits  Stable-forms completed for facility in [place]    History of alcohol abuse  sober    Mixed hyperlipidemia  Well controlled, continue current medications at current doses     Essential hypertension  Well controlled, continue current medications at current doses     Screening examination for pulmonary tuberculosis    - [place] QUANTIFERON GOLD PLUS TB    Encounter for screening for COVID-19    - SARS CoV2 CoVid 19 Qual  ([facility])    Fear of flying  I reviewed the risks, benefits, and possible side effects of the medication.  The patient had an opportunity to ask any questions regarding the treatment plan. The patient was encouraged to call my office if any problems.   - LORazepam (ATIVAN) 0.5 MG tablet  Dispense: 8 tablet; Refill: 0      Review of external notes as documented elsewhere in note  Review of the result(s) of each unique test - lab tests  Ordering of each unique test  Prescription drug management         Regular exercise    No follow-ups on file.    Gerald Diggs MD  [facility]    Ivania Ayers is a 77 year old who presents for the following health issues  accompanied by his spouse.    HPI     Hyperlipidemia Follow-Up      Are you regularly taking any medication or supplement to lower your cholesterol?   Yes- crestor    Are you having muscle aches or other side effects that you think could be caused by your cholesterol lowering medication?  No    Hypertension Follow-up      Do you check your blood pressure regularly outside of the clinic? No     Are you following a low salt diet? No    Are your blood pressures ever more than 140 on the top number (systolic) OR more   than 90 on the bottom number (diastolic), for example 140/90? No    Cognitive deficits,has seen 2 neurologists both recommending 24 hour cares, here with wife-will be moving to [place] in a week, will e in new care facility    Pt gets anxious easily- spouse hoping can use ativan for  flight    Pt not drinking    Pt does also need forms completed for new facility as well as TB test and PCR covid test            Review of Systems   Constitutional, HEENT, cardiovascular, pulmonary, gi and gu systems are negative, except as otherwise noted.      Objective    /80 (BP Location: Left arm, Patient Position: Chair, Cuff Size: Adult Regular)   Pulse 76   Temp 97.2  F (36.2  C)   Resp 20   Wt 89.3 kg (196 lb 12.8 oz)   BMI 28.24 kg/m    Body mass index is 28.24 kg/m .  Physical Exam   GENERAL: healthy, alert and no distress  EYES: Eyes grossly normal to inspection, PERRL and conjunctivae and sclerae normal  HENT: ear canals and TM's normal, nose and mouth without ulcers or lesions  NECK: no adenopathy, no asymmetry, masses, or scars and thyroid normal to palpation  RESP: lungs clear to auscultation - no rales, rhonchi or wheezes  CV: regular rate and rhythm, normal S1 S2, no S3 or S4, no murmur, click or rub, no peripheral edema and peripheral pulses strong  ABDOMEN: soft, nontender, no hepatosplenomegaly, no masses and bowel sounds normal  MS: no gross musculoskeletal defects noted, no edema  SKIN: no suspicious lesions or rashes  PSYCH: memory poor,  affect normal/bright    Admission on 09/23/2021, Discharged on 10/08/2021   Component Date Value Ref Range Status     ABO/RH(D) 09/23/2021 A POS   Final     Antibody Screen 09/23/2021 Negative  Negative Final     SPECIMEN EXPIRATION DATE 09/23/2021 [id number]   Final     Culture 09/23/2021 No anaerobic organisms isolated   Final     Gram Stain Result 09/23/2021 No organisms seen   Final     Gram Stain Result 09/23/2021 1+ WBC seen   Final     Culture 09/23/2021 No Growth   Final     Culture 09/23/2021 No Growth   Final     Magnesium 09/23/2021 1.8  1.6 - 2.3 mg/dL Final     Phosphorus 09/23/2021 2.9  2.5 - 4.5 mg/dL Final     Hemoglobin 09/24/2021 12.5* 13.3 - 17.7 g/dL Final     Sodium 09/24/2021 140  133 - 144 mmol/L Final     Potassium  09/24/2021 3.9  3.4 - 5.3 mmol/L Final     Chloride 09/24/2021 109  94 - 109 mmol/L Final     Carbon Dioxide (CO2) 09/24/2021 23  20 - 32 mmol/L Final     Anion Gap 09/24/2021 8  3 - 14 mmol/L Final     Urea Nitrogen 09/24/2021 8  7 - 30 mg/dL Final     Creatinine 09/24/2021 0.72  0.66 - 1.25 mg/dL Final     Calcium 09/24/2021 8.7  8.5 - 10.1 mg/dL Final     Glucose 09/24/2021 113* 70 - 99 mg/dL Final     GFR Estimate 09/24/2021 >90  >60 mL/min/1.73m2 Final    As of July 11, 2021, eGFR is calculated by the CKD-EPI creatinine equation, without race adjustment. eGFR can be influenced by muscle mass, exercise, and diet. The reported eGFR is an estimation only and is only applicable if the renal function is stable.     Hemoglobin 09/25/2021 10.8* 13.3 - 17.7 g/dL Final     Sodium 09/25/2021 141  133 - 144 mmol/L Final     Potassium 09/25/2021 3.3* 3.4 - 5.3 mmol/L Final     Chloride 09/25/2021 111* 94 - 109 mmol/L Final     Carbon Dioxide (CO2) 09/25/2021 26  20 - 32 mmol/L Final     Anion Gap 09/25/2021 4  3 - 14 mmol/L Final     Urea Nitrogen 09/25/2021 6* 7 - 30 mg/dL Final     Creatinine 09/25/2021 0.70  0.66 - 1.25 mg/dL Final     Calcium 09/25/2021 8.3* 8.5 - 10.1 mg/dL Final     Glucose 09/25/2021 96  70 - 99 mg/dL Final     GFR Estimate 09/25/2021 >90  >60 mL/min/1.73m2 Final    As of July 11, 2021, eGFR is calculated by the CKD-EPI creatinine equation, without race adjustment. eGFR can be influenced by muscle mass, exercise, and diet. The reported eGFR is an estimation only and is only applicable if the renal function is stable.     Potassium 09/25/2021 3.8  3.4 - 5.3 mmol/L Final     Magnesium 09/26/2021 1.7  1.6 - 2.3 mg/dL Final     Sodium 09/26/2021 140  133 - 144 mmol/L Final     Potassium 09/26/2021 3.2* 3.4 - 5.3 mmol/L Final     Chloride 09/26/2021 106  94 - 109 mmol/L Final     Carbon Dioxide (CO2) 09/26/2021 28  20 - 32 mmol/L Final     Anion Gap 09/26/2021 6  3 - 14 mmol/L Final     Urea Nitrogen  09/26/2021 7  7 - 30 mg/dL Final     Creatinine 09/26/2021 0.70  0.66 - 1.25 mg/dL Final     Calcium 09/26/2021 8.7  8.5 - 10.1 mg/dL Final     Glucose 09/26/2021 99  70 - 99 mg/dL Final     Alkaline Phosphatase 09/26/2021 49  40 - 150 U/L Final     AST 09/26/2021 23  0 - 45 U/L Final     ALT 09/26/2021 16  0 - 70 U/L Final     Protein Total 09/26/2021 6.2* 6.8 - 8.8 g/dL Final     Albumin 09/26/2021 2.9* 3.4 - 5.0 g/dL Final     Bilirubin Total 09/26/2021 1.0  0.2 - 1.3 mg/dL Final     GFR Estimate 09/26/2021 >90  >60 mL/min/1.73m2 Final    As of July 11, 2021, eGFR is calculated by the CKD-EPI creatinine equation, without race adjustment. eGFR can be influenced by muscle mass, exercise, and diet. The reported eGFR is an estimation only and is only applicable if the renal function is stable.     WBC Count 09/26/2021 5.9  4.0 - 11.0 10e3/uL Final     RBC Count 09/26/2021 3.32* 4.40 - 5.90 10e6/uL Final     Hemoglobin 09/26/2021 11.7* 13.3 - 17.7 g/dL Final     Hematocrit 09/26/2021 36.4* 40.0 - 53.0 % Final     MCV 09/26/2021 110* 78 - 100 fL Final     MCH 09/26/2021 35.2* 26.5 - 33.0 pg Final     MCHC 09/26/2021 32.1  31.5 - 36.5 g/dL Final     RDW 09/26/2021 15.4* 10.0 - 15.0 % Final     Platelet Count 09/26/2021 125* 150 - 450 10e3/uL Final     Potassium 09/26/2021 3.8  3.4 - 5.3 mmol/L Final     Sodium 09/27/2021 140  133 - 144 mmol/L Final     Potassium 09/27/2021 3.7  3.4 - 5.3 mmol/L Final     Chloride 09/27/2021 107  94 - 109 mmol/L Final     Carbon Dioxide (CO2) 09/27/2021 27  20 - 32 mmol/L Final     Anion Gap 09/27/2021 6  3 - 14 mmol/L Final     Urea Nitrogen 09/27/2021 10  7 - 30 mg/dL Final     Creatinine 09/27/2021 0.79  0.66 - 1.25 mg/dL Final     Calcium 09/27/2021 8.7  8.5 - 10.1 mg/dL Final     Glucose 09/27/2021 100* 70 - 99 mg/dL Final     GFR Estimate 09/27/2021 87  >60 mL/min/1.73m2 Final    As of July 11, 2021, eGFR is calculated by the CKD-EPI creatinine equation, without race adjustment.  eGFR can be influenced by muscle mass, exercise, and diet. The reported eGFR is an estimation only and is only applicable if the renal function is stable.     Magnesium 09/27/2021 2.0  1.6 - 2.3 mg/dL Final     WBC Count 09/27/2021 5.1  4.0 - 11.0 10e3/uL Final     RBC Count 09/27/2021 3.23* 4.40 - 5.90 10e6/uL Final     Hemoglobin 09/27/2021 11.4* 13.3 - 17.7 g/dL Final     Hematocrit 09/27/2021 35.5* 40.0 - 53.0 % Final     MCV 09/27/2021 110* 78 - 100 fL Final     MCH 09/27/2021 35.3* 26.5 - 33.0 pg Final     MCHC 09/27/2021 32.1  31.5 - 36.5 g/dL Final     RDW 09/27/2021 15.5* 10.0 - 15.0 % Final     Platelet Count 09/27/2021 137* 150 - 450 10e3/uL Final     Potassium 09/28/2021 3.7  3.4 - 5.3 mmol/L Final     Potassium 09/29/2021 3.7  3.4 - 5.3 mmol/L Final     Sodium 09/30/2021 138  133 - 144 mmol/L Final     Potassium 09/30/2021 4.1  3.4 - 5.3 mmol/L Final     Chloride 09/30/2021 106  94 - 109 mmol/L Final     Carbon Dioxide (CO2) 09/30/2021 27  20 - 32 mmol/L Final     Anion Gap 09/30/2021 5  3 - 14 mmol/L Final     Urea Nitrogen 09/30/2021 8  7 - 30 mg/dL Final     Creatinine 09/30/2021 0.80  0.66 - 1.25 mg/dL Final     Calcium 09/30/2021 9.0  8.5 - 10.1 mg/dL Final     Glucose 09/30/2021 97  70 - 99 mg/dL Final     GFR Estimate 09/30/2021 87  >60 mL/min/1.73m2 Final    As of July 11, 2021, eGFR is calculated by the CKD-EPI creatinine equation, without race adjustment. eGFR can be influenced by muscle mass, exercise, and diet. The reported eGFR is an estimation only and is only applicable if the renal function is stable.     WBC Count 09/30/2021 4.1  4.0 - 11.0 10e3/uL Final     RBC Count 09/30/2021 3.09* 4.40 - 5.90 10e6/uL Final     Hemoglobin 09/30/2021 10.8* 13.3 - 17.7 g/dL Final     Hematocrit 09/30/2021 34.1* 40.0 - 53.0 % Final     MCV 09/30/2021 110* 78 - 100 fL Final     MCH 09/30/2021 35.0* 26.5 - 33.0 pg Final     MCHC 09/30/2021 31.7  31.5 - 36.5 g/dL Final     RDW 09/30/2021 15.3* 10.0 - 15.0  % Final     Platelet Count 09/30/2021 166  150 - 450 10e3/uL Final     SARS CoV2 PCR 09/30/2021 Negative  Negative Final    NEGATIVE: SARS-CoV-2 (COVID-19) RNA not detected, presumed negative.     Vitamin B12 10/02/2021 1,655* 193 - 986 pg/mL Final     Folic Acid 10/02/2021 38.4  >=5.4 ng/mL Final    Deficient: <3.4 ng/mL  Indeterminate: 3.4-5.4 ng/mL  Normal: > 5.4 ng/mL     WBC Count 10/03/2021 3.6* 4.0 - 11.0 10e3/uL Final     RBC Count 10/03/2021 3.28* 4.40 - 5.90 10e6/uL Final     Hemoglobin 10/03/2021 11.5* 13.3 - 17.7 g/dL Final     Hematocrit 10/03/2021 36.1* 40.0 - 53.0 % Final     MCV 10/03/2021 110* 78 - 100 fL Final     MCH 10/03/2021 35.1* 26.5 - 33.0 pg Final     MCHC 10/03/2021 31.9  31.5 - 36.5 g/dL Final     RDW 10/03/2021 14.6  10.0 - 15.0 % Final     Platelet Count 10/03/2021 211  150 - 450 10e3/uL Final     Sodium 10/03/2021 142  133 - 144 mmol/L Final     Potassium 10/03/2021 3.9  3.4 - 5.3 mmol/L Final     Chloride 10/03/2021 110* 94 - 109 mmol/L Final     Carbon Dioxide (CO2) 10/03/2021 27  20 - 32 mmol/L Final     Anion Gap 10/03/2021 5  3 - 14 mmol/L Final     Urea Nitrogen 10/03/2021 7  7 - 30 mg/dL Final     Creatinine 10/03/2021 0.90  0.66 - 1.25 mg/dL Final     Calcium 10/03/2021 9.3  8.5 - 10.1 mg/dL Final     Glucose 10/03/2021 92  70 - 99 mg/dL Final     GFR Estimate 10/03/2021 83  >60 mL/min/1.73m2 Final    As of July 11, 2021, eGFR is calculated by the CKD-EPI creatinine equation, without race adjustment. eGFR can be influenced by muscle mass, exercise, and diet. The reported eGFR is an estimation only and is only applicable if the renal function is stable.     TSH 10/03/2021 2.18  0.40 - 4.00 mU/L Final     SARS CoV2 PCR 10/08/2021 Negative  Negative Final    NEGATIVE: SARS-CoV-2 (COVID-19) RNA not detected, presumed negative.

## 2022-02-15 LAB — SARS-COV-2 RNA SPEC QL NAA+PROBE: NOT DETECTED

## 2022-02-16 LAB
GAMMA INTERFERON BACKGROUND BLD IA-ACNC: 0.05 IU/ML
M TB IFN-G BLD-IMP: NEGATIVE
M TB IFN-G CD4+ BCKGRND COR BLD-ACNC: 9.95 IU/ML
MITOGEN IGNF BCKGRD COR BLD-ACNC: 0 IU/ML
MITOGEN IGNF BCKGRD COR BLD-ACNC: 0 IU/ML
QUANTIFERON MITOGEN: 10 IU/ML
QUANTIFERON NIL TUBE: 0.05 IU/ML
QUANTIFERON TB1 TUBE: 0.05 IU/ML
QUANTIFERON TB2 TUBE: 0.05

## 2022-02-16 NOTE — ANESTHESIA PROCEDURE NOTES
Airway       Patient location during procedure: OR       Procedure Start/Stop Times: 9/23/2021 8:03 AM and 9/23/2021 8:04 AM  Staff -        CRNA: Chen Avilez APRN CRNA       Performed By: CRNA  Consent for Airway        Urgency: elective  Indications and Patient Condition       Indications for airway management: josh-procedural       Induction type:intravenous       Mask difficulty assessment: 2 - vent by mask + OA or adjuvant +/- NMBA    Final Airway Details       Final airway type: endotracheal airway       Successful airway: ETT - single  Endotracheal Airway Details        ETT size (mm): 8.0       Cuffed: yes       Successful intubation technique: direct laryngoscopy       DL Blade Type: Torres 3       Grade View of Cords: 1       Adjucts: stylet       Position: Right       Measured from: lips       Secured at (cm): 24       Bite block used: Soft    Post intubation assessment        Placement verified by: capnometry, equal breath sounds and chest rise        Number of attempts at approach: 1       Secured with: plastic tape       Ease of procedure: easy       Dentition: Intact and Unchanged          
Breath sounds clear and equal bilaterally.

## 2022-06-08 ENCOUNTER — OFFICE VISIT (OUTPATIENT)
Dept: URBAN - METROPOLITAN AREA CLINIC 19 | Facility: CLINIC | Age: 77
End: 2022-06-08
Payer: MEDICARE

## 2022-06-08 ENCOUNTER — LAB OUTSIDE AN ENCOUNTER (OUTPATIENT)
Dept: URBAN - METROPOLITAN AREA CLINIC 19 | Facility: CLINIC | Age: 77
End: 2022-06-08

## 2022-06-08 ENCOUNTER — DASHBOARD ENCOUNTERS (OUTPATIENT)
Age: 77
End: 2022-06-08

## 2022-06-08 VITALS
HEIGHT: 70 IN | WEIGHT: 205 LBS | HEART RATE: 78 BPM | BODY MASS INDEX: 29.35 KG/M2 | SYSTOLIC BLOOD PRESSURE: 116 MMHG | TEMPERATURE: 98.3 F | OXYGEN SATURATION: 99 % | DIASTOLIC BLOOD PRESSURE: 64 MMHG

## 2022-06-08 DIAGNOSIS — E83.118 OTHER HEMOCHROMATOSIS: ICD-10-CM

## 2022-06-08 DIAGNOSIS — K70.9 ALCOHOLIC LIVER DISEASE: ICD-10-CM

## 2022-06-08 PROBLEM — 399187006: Status: ACTIVE | Noted: 2022-06-08

## 2022-06-08 PROBLEM — 41309000: Status: ACTIVE | Noted: 2022-06-08

## 2022-06-08 PROCEDURE — 99204 OFFICE O/P NEW MOD 45 MIN: CPT | Performed by: STUDENT IN AN ORGANIZED HEALTH CARE EDUCATION/TRAINING PROGRAM

## 2022-06-08 RX ORDER — DONEPEZIL HYDROCHLORIDE 5 MG/1
1 TABLET AT BEDTIME TABLET, FILM COATED ORAL ONCE A DAY
Status: ACTIVE | COMMUNITY

## 2022-06-08 NOTE — HPI-TODAY'S VISIT:
The pt is a 76 yo M here for establishing care.  Has a h/o alcoholic liver disease and hemochromatosis.  PCP concerned and wanted him to establish care.  Had normal liver labs and an US at [facility] recently which were normal per wife.  Prior to coming here, pt was in [facility] and had a fall after an episode of alcoholism with hospitalization, and full workup.  PCP's note in chart.  Will obtain records.  Reviewed labs at this time.

## 2022-10-16 ENCOUNTER — HEALTH MAINTENANCE LETTER (OUTPATIENT)
Age: 77
End: 2022-10-16

## 2022-12-15 ENCOUNTER — OFFICE VISIT (OUTPATIENT)
Dept: URBAN - METROPOLITAN AREA CLINIC 19 | Facility: CLINIC | Age: 77
End: 2022-12-15

## 2023-11-04 ENCOUNTER — HEALTH MAINTENANCE LETTER (OUTPATIENT)
Age: 78
End: 2023-11-04

## 2024-02-29 NOTE — PHARMACY-CONSULT NOTE
Pharmacy Delirium Chart Review    Upon chart review, the following medications may contribute to possible patient delirium: lorazepam (last dose 5 days ago), hydroxyzine (last dose 6 days ago).  Please consult unit pharmacist with further questions.    Amor Lauren, [facility]  [phone]     Pt is a 89yo F with PMHx of Paroxysmal AFIB, HFpEF,  CKD, Adrenal insufficiency, HFpEF, Lymphoma in remission,  HTN, Iron deficiency,  ICH who presented  to  ED from skilled nursing for sob and chest pressure which was worse in the mornings for the last 2 weeks. Admitted for decompensation of diastolic heart failure, started on IV Lasix. Pt has TTE which showed mild AS and severe MR. Cardiology consulted, and after discussion with family conservative approach for management of MR was determined. Florinef was decreased to daily per cardiology recommendation in setting of volume retention and CHF.Palliative team consulted, referral placed for hospice, daughter and son in agreement. Pt with PAfib, not on AC 2/2 previous GI bleed and questionable ICH. Hospital stay also complicated by hypokalemia, which was repleted IV and PO. Spironolactone added to medication regimen to aid in hypokalemia. Home O2 evaluation done, pt qualifies for home O2. Pt with chronic anemia, M spike noted on spep, likely 2/2 possible myelodysplastic syndrome or myeloma, family made aware, agreed will not pursue any further testing for this at this time.     Pt was seen and examined at bedside today, along with daughter and son at bedside, no acute complaints. Denies sob or cp. Pt is medically optimized to return to [place] with O2 therapy and possible hospice care.       ROS: negative except as above.     Vital Signs Last 24 Hrs  T(C): 36.9 (02-29-24 @ 08:25), Max: 36.9 (02-29-24 @ 08:25)  T(F): 98.4 (02-29-24 @ 08:25), Max: 98.4 (02-29-24 @ 08:25)  HR: 64 (02-29-24 @ 08:25) (63 - 68)  BP: 157/49 (02-29-24 @ 08:25) (154/59 - 174/59)  BP(mean): --  RR: 18 (02-29-24 @ 08:25) (18 - 18)  SpO2: 95% (02-29-24 @ 08:25) (95% - 97%)        General: WN/WD NAD  Head- Atraumatic, normocephalic   Neurology: A&Ox3, nonfocal  HEENT- PERRLA, moist muscous membrane  Neck-supple, no JVD  Respiratory: rales R base, decreased BS RUL   CVS:   systolic ejection murmur LLSB   Genitourinary- voiding, non palpable bladder  Extremities: No edema, + peripheral pulses  Skin-no rashes  Psychiatric- mood stable    < from: TTE Echo Complete w/o Contrast w/ Doppler (02.27.24 @ 15:23) >       Summary     The left ventricle is normal in size, wall thickness, wall motion and   contractility as seen in limited views.   Mild asymmetrical septal left ventricular hypertrophy is present.   Mild diastolic dysfunction (stage I).   Estimated left ventricular ejection fraction is 55-60 %.   The left atrium is mildly dilated.   Moderate (2+) eccentric aortic regurgitation.   Severe (4+) mitral regurgitation.   Mild aortic stenosis.   Moderate (2+) tricuspid valve regurgitation is present.   Mild pulmonaryhypertension.   Pleural effusion is present.   Mild aortic stenosis is consistent with previous echo on 9/2022.     Signature    < end of copied text >           Pt is a 89yo F with PMHx of Paroxysmal AFIB, HFpEF,  CKD, Adrenal insufficiency, HFpEF, Lymphoma in remission,  HTN, Iron deficiency,  ICH who [facility]  [facility] and chest pressure which was worse in the mornings for the last 2 weeks. Admitted for decompensation of diastolic heart failure, started on IV Lasix. Pt has TTE which showed mild AS and severe MR. Cardiology consulted, and after discussion with family conservative approach for management of MR was determined. Florinef was decreased to daily per cardiology recommendation in setting of volume retention and CHF.Palliative team consulted, referral placed for hospice, daughter and son in agreement. Pt with PAfib, not on AC 2/2 previous GI bleed and questionable ICH. Hospital stay also complicated by hypokalemia, which was repleted IV and PO. Spironolactone added to medication regimen to aid in hypokalemia. Home O2 evaluation done, pt qualifies for home O2. Pt with chronic anemia, M spike noted on spep, likely 2/2 possible myelodysplastic syndrome or myeloma, family made aware, agreed will not pursue any further testing for this at this time.     Pt was seen and examined at bedside today, along with daughter and son at bedside, no acute complaints. Denies sob or cp. Pt is medically optimized to return to [place] with O2 therapy and possible hospice care.       ROS: negative except as above.     Vital Signs Last 24 Hrs  T(C): 36.9 (02-29-24 @ 08:25), Max: 36.9 (02-29-24 @ 08:25)  T(F): 98.4 (02-29-24 @ 08:25), Max: 98.4 (02-29-24 @ 08:25)  HR: 64 (02-29-24 @ 08:25) (63 - 68)  BP: 157/49 (02-29-24 @ 08:25) (154/59 - 174/59)  BP(mean): --  RR: 18 (02-29-24 @ 08:25) (18 - 18)  SpO2: 95% (02-29-24 @ 08:25) (95% - 97%)        General: WN/WD NAD  Head- Atraumatic, normocephalic   Neurology: A&Ox3, nonfocal  HEENT- PERRLA, moist muscous membrane  Neck-supple, no JVD  Respiratory: rales R base, decreased BS RUL   CVS:   systolic ejection murmur LLSB   Genitourinary- voiding, non palpable bladder  Extremities: No edema, + peripheral pulses  Skin-no rashes  Psychiatric- mood stable    < from: TTE Echo Complete w/o Contrast w/ Doppler (02.27.24 @ 15:23) >       Summary     The left ventricle is normal in size, wall thickness, wall motion and   contractility as seen in limited views.   Mild asymmetrical septal left ventricular hypertrophy is present.   Mild diastolic dysfunction (stage I).   Estimated left ventricular ejection fraction is 55-60 %.   The left atrium is mildly dilated.   Moderate (2+) eccentric aortic regurgitation.   Severe (4+) mitral regurgitation.   Mild aortic stenosis.   Moderate (2+) tricuspid valve regurgitation is present.   Mild pulmonaryhypertension.   Pleural effusion is present.   Mild aortic stenosis is consistent with previous echo on 9/2022.     Signature    < end of copied text >      medicine attending addendum- less dyspneic.  dull lung bases.  we discussed golls of care with patient and family at bedside. [place] without further workup/interventions other than optimizing medical therapywwhich includes adding spironolactine and decreasing the florinef. this to reduce BP, slat and water retention and prevent hypokalemia. total time ~30 min.     Pt is a 89yo F with PMHx of Paroxysmal AFIB, HFpEF,  CKD, Adrenal insufficiency, HFpEF, Lymphoma in remission,  HTN, Iron deficiency,  ICH who [facility]  [facility] [place] for [place] and chest pressure which was worse in the mornings for the last 2 weeks. Admitted for decompensation of diastolic heart failure, started on IV Lasix. Pt has TTE which showed mild AS and severe MR. Cardiology consulted, and after discussion with family conservative approach for management of MR was determined. Florinef was decreased to daily per cardiology recommendation in setting of volume retention and CHF.Palliative team consulted, referral placed for hospice, daughter and son in agreement. Pt with PAfib, not on AC 2/2 previous GI bleed and questionable ICH. Hospital stay also complicated by hypokalemia, which was repleted IV and PO. Spironolactone added to medication regimen to aid in hypokalemia. Home O2 evaluation done, pt qualifies for home O2. Pt with chronic anemia, M spike noted on spep, likely 2/2 possible myelodysplastic syndrome or myeloma, family made aware, agreed will not pursue any further testing for this at this time.     Pt was seen and examined at bedside today, along with daughter and son at bedside, no acute complaints. Denies sob or cp. Pt is medically optimized to return to [place] with O2 therapy and possible hospice care.     ROS: negative except as above.     Vital Signs Last 24 Hrs  T(C): 36.9 (02-29-24 @ 08:25), Max: 36.9 (02-29-24 @ 08:25)  T(F): 98.4 (02-29-24 @ 08:25), Max: 98.4 (02-29-24 @ 08:25)  HR: 64 (02-29-24 @ 08:25) (63 - 68)  BP: 157/49 (02-29-24 @ 08:25) (154/59 - 174/59)  BP(mean): --  RR: 18 (02-29-24 @ 08:25) (18 - 18)  SpO2: 95% (02-29-24 @ 08:25) (95% - 97%)    General: WN/WD NAD  Head- Atraumatic, normocephalic   Neurology: A&Ox3, nonfocal  HEENT- PERRLA, moist muscous membrane  Neck-supple, no JVD  Respiratory: rales R base, decreased BS RUL   CVS:   systolic ejection murmur LLSB   Genitourinary- voiding, non palpable bladder  Extremities: No edema, + peripheral pulses  Skin-no rashes  Psychiatric- mood stable    TTE Echo Complete w/o Contrast w/ Doppler (02.27.24 @ 15:23)      Summary     The left ventricle is normal in size, wall thickness, wall motion and   contractility as seen in limited views.   Mild asymmetrical septal left ventricular hypertrophy is present.   Mild diastolic dysfunction (stage I).   Estimated left ventricular ejection fraction is 55-60 %.   The left atrium is mildly dilated.   Moderate (2+) eccentric aortic regurgitation.   Severe (4+) mitral regurgitation.   Mild aortic stenosis.   Moderate (2+) tricuspid valve regurgitation is present.   Mild pulmonary hypertension.   Pleural effusion is present.   Mild aortic stenosis is consistent with previous echo on 9/2022.      medicine attending addendum- less dyspneic.  dull lung bases.  we discussed goals of care with patient and family at bedside. [place] back to [place] without further workup/interventions other than optimizing medical therapywwhich includes adding spironolactine and decreasing the florinef. this to reduce BP, slat and water retention and prevent hypokalemia. total time ~30 min.

## 2024-06-17 PROBLEM — Z76.89 HEALTH CARE HOME: Status: RESOLVED | Noted: 2020-06-17 | Resolved: 2024-06-17

## 2024-12-22 ENCOUNTER — HEALTH MAINTENANCE LETTER (OUTPATIENT)
Age: 79
End: 2024-12-22

## 2025-01-02 ENCOUNTER — OFFICE VISIT (OUTPATIENT)
Dept: URBAN - METROPOLITAN AREA CLINIC 19 | Facility: CLINIC | Age: 80
End: 2025-01-02
Payer: MEDICARE

## 2025-01-02 VITALS
HEIGHT: 70 IN | WEIGHT: 233 LBS | OXYGEN SATURATION: 99 % | SYSTOLIC BLOOD PRESSURE: 144 MMHG | HEART RATE: 79 BPM | TEMPERATURE: 97.8 F | DIASTOLIC BLOOD PRESSURE: 83 MMHG | BODY MASS INDEX: 33.36 KG/M2

## 2025-01-02 DIAGNOSIS — E83.118 OTHER HEMOCHROMATOSIS: ICD-10-CM

## 2025-01-02 DIAGNOSIS — K70.9 ALCOHOLIC LIVER DISEASE: ICD-10-CM

## 2025-01-02 PROCEDURE — 99215 OFFICE O/P EST HI 40 MIN: CPT | Performed by: NURSE PRACTITIONER

## 2025-01-02 RX ORDER — DONEPEZIL HYDROCHLORIDE 5 MG/1
1 TABLET AT BEDTIME TABLET, FILM COATED ORAL ONCE A DAY
Status: ACTIVE | COMMUNITY

## 2025-01-02 NOTE — HPI-TODAY'S VISIT:
6/8/2022 (Dr. Anderson):        The pt is a 78 yo M here for establishing care. Has a h/o alcoholic liver disease and hemochromatosis. PCP concerned and wanted him to establish care. Had normal liver labs and an US at [facility] recently which were normal per wife. Prior to coming here, pt was in [facility] and had a fall after an episode of alcoholism with hospitalization, and full workup. PCP's note in chart.        Will obtain records. Reviewed labs at this time.    1/2/2025 (HENRIK Bingham): He was last seen in GI clinic in 2022.  Labs and US were ordered but not done.  At the time he reported drinking an occasional glass of wine and he was instructed to refrain from EtOH and avoid NSAIDs. He is here for c/o significant abdominal distention. Onset few months ago. NO pain. No jaundice or itching. BMs daily and regular. No bloody or black stools. No upper GI complaints like reflux or N/V. Appetite is very good. He reports about 20 lb weight gain since. Only new med was for overactive bladder which he stopped recently due to other side effects. One etoh drink a month. Mild edema in bilateral LE which is not new and actually better since he started low salt diet. Takes 400mg Ibuprofen 1-2 times/week. Denies herbal supplements. Family member reports he has memory issues dating back to 2021 and living in [facility].

## 2025-01-03 LAB
AFP, SERUM, TUMOR MARKER: 2.9
ALBUMIN/GLOBULIN RATIO: 1.8
ALBUMIN: 4.6
ALKALINE PHOSPHATASE: 73
ALT: 12
AST: 16
BILIRUBIN, TOTAL: 0.5
BUN/CREATININE RATIO: (no result)
CALCIUM: 9.3
CARBON DIOXIDE: 28
CHLORIDE: 104
CREATININE: 0.9
EGFR: 87
FERRITIN, SERUM: 208
FIB 4 INDEX: 1.56
FIB 4 INTERPRETATION: (no result)
GLOBULIN: 2.5
GLUCOSE: 112
HEMATOCRIT: 40.6
HEMOGLOBIN: 13.1
INR: 1
IRON BIND.CAP.(TIBC): 291
IRON SATURATION: 49
IRON: 143
MCH: 32.2
MCHC: 32.3
MCV: 99.8
MPV: 10.3
PLATELET COUNT: 234
PLATELET COUNT: 234
POTASSIUM: 3.8
PROTEIN, TOTAL: 7.1
PT: 10.9
RDW: 14.5
RED BLOOD CELL COUNT: 4.07
SODIUM: 140
UREA NITROGEN (BUN): 11
WHITE BLOOD CELL COUNT: 5.3

## 2025-01-10 ENCOUNTER — OFFICE VISIT (OUTPATIENT)
Dept: URBAN - METROPOLITAN AREA CLINIC 18 | Facility: CLINIC | Age: 80
End: 2025-01-10

## 2025-01-24 ENCOUNTER — OFFICE VISIT (OUTPATIENT)
Dept: URBAN - METROPOLITAN AREA CLINIC 18 | Facility: CLINIC | Age: 80
End: 2025-01-24
Payer: MEDICARE

## 2025-01-24 DIAGNOSIS — K76.0 FATTY LIVER: ICD-10-CM

## 2025-01-24 DIAGNOSIS — N28.1 CYST OF RIGHT KIDNEY: ICD-10-CM

## 2025-01-24 DIAGNOSIS — K70.9 ALCOHOLIC LIVER DISEASE: ICD-10-CM

## 2025-01-24 PROCEDURE — 76700 US EXAM ABDOM COMPLETE: CPT | Performed by: STUDENT IN AN ORGANIZED HEALTH CARE EDUCATION/TRAINING PROGRAM

## 2025-02-06 ENCOUNTER — OFFICE VISIT (OUTPATIENT)
Dept: URBAN - METROPOLITAN AREA CLINIC 19 | Facility: CLINIC | Age: 80
End: 2025-02-06
Payer: MEDICARE

## 2025-02-06 VITALS
BODY MASS INDEX: 31.98 KG/M2 | DIASTOLIC BLOOD PRESSURE: 84 MMHG | TEMPERATURE: 98.2 F | WEIGHT: 223.4 LBS | HEART RATE: 82 BPM | HEIGHT: 70 IN | OXYGEN SATURATION: 96 % | SYSTOLIC BLOOD PRESSURE: 138 MMHG

## 2025-02-06 DIAGNOSIS — K70.9 ALCOHOLIC LIVER DISEASE: ICD-10-CM

## 2025-02-06 DIAGNOSIS — E83.118 OTHER HEMOCHROMATOSIS: ICD-10-CM

## 2025-02-06 PROCEDURE — 99214 OFFICE O/P EST MOD 30 MIN: CPT | Performed by: STUDENT IN AN ORGANIZED HEALTH CARE EDUCATION/TRAINING PROGRAM

## 2025-02-06 RX ORDER — DONEPEZIL HYDROCHLORIDE 5 MG/1
1 TABLET AT BEDTIME TABLET, FILM COATED ORAL ONCE A DAY
Status: ACTIVE | COMMUNITY

## 2025-02-06 NOTE — EXAM-FUNCTIONAL ASSESSMENT
General--no acute distress Eyes--anicteric HENT--normocephalic, atraumatic head Neck--no lymphadenopathy Chest--normal breath sounds Heart--regular rate and rhythm Abdomen--firm, non tender, mildly distended, no fluid wave appreciated, inverted umbilicus, bowel sounds present

## 2025-02-06 NOTE — HPI-OTHER HISTORIES
6/8/2022 (Dr. Anderson):        The pt is a 76 yo M here for establishing care. Has a h/o alcoholic liver disease and hemochromatosis. PCP concerned and wanted him to establish care. Had normal liver labs and an US at [facility] recently which were normal per wife. Prior to coming here, pt was in [facility] and had a fall after an episode of alcoholism with hospitalization, and full workup. PCP's note in chart.        Will obtain records. Reviewed labs at this time.    1/2/2025 (HENRIK Bingham): He was last seen in GI clinic in 2022. Labs and US were ordered but not done. At the time he reported drinking an occasional glass of wine and he was instructed to refrain from EtOH and avoid NSAIDs. He is here for c/o significant abdominal distention. Onset few months ago. NO pain. No jaundice or itching. BMs daily and regular. No bloody or black stools. No upper GI complaints like reflux or N/V. Appetite is very good. He reports about 20 lb weight gain since. Only new med was for overactive bladder which he stopped recently due to other side effects. One etoh drink a month. Mild edema in bilateral LE which is not new and actually better since he started low salt diet. Takes 400mg Ibuprofen 1-2 times/week. Denies herbal supplements. Family member reports he has memory issues dating back to 2021 and living in [facility]. Plan:  Labs including CBC, CMP, PT/INR, AFP, iron panel, US of abdomen.  RTC in 3 months.

## 2025-04-09 ENCOUNTER — P2P PATIENT RECORD (OUTPATIENT)
Age: 80
End: 2025-04-09

## 2025-04-17 ENCOUNTER — OFFICE VISIT (OUTPATIENT)
Dept: URBAN - METROPOLITAN AREA CLINIC 19 | Facility: CLINIC | Age: 80
End: 2025-04-17
Payer: MEDICARE

## 2025-04-17 DIAGNOSIS — E83.118 OTHER HEMOCHROMATOSIS: ICD-10-CM

## 2025-04-17 DIAGNOSIS — R19.7 CHRONIC DIARRHEA: ICD-10-CM

## 2025-04-17 DIAGNOSIS — K70.9 ALCOHOLIC LIVER DISEASE: ICD-10-CM

## 2025-04-17 DIAGNOSIS — D64.89 ANEMIA DUE TO OTHER CAUSE, NOT CLASSIFIED: ICD-10-CM

## 2025-04-17 PROBLEM — 236071009: Status: ACTIVE | Noted: 2025-04-17

## 2025-04-17 PROBLEM — 271737000: Status: ACTIVE | Noted: 2025-04-17

## 2025-04-17 PROCEDURE — 99214 OFFICE O/P EST MOD 30 MIN: CPT | Performed by: STUDENT IN AN ORGANIZED HEALTH CARE EDUCATION/TRAINING PROGRAM

## 2025-04-17 RX ORDER — DONEPEZIL HYDROCHLORIDE 5 MG/1
1 TABLET AT BEDTIME TABLET, FILM COATED ORAL ONCE A DAY
Status: ON HOLD | COMMUNITY

## 2025-04-17 NOTE — HPI-OTHER HISTORIES
6/8/2022 (Dr. Anderson):        The pt is a 76 yo M here for establishing care. Has a h/o alcoholic liver disease and hemochromatosis. PCP concerned and wanted him to establish care. Had normal liver labs and an US at [facility] recently which were normal per wife. Prior to coming here, pt was in [facility] and had a fall after an episode of alcoholism with hospitalization, and full workup. PCP's note in chart.        Will obtain records. Reviewed labs at this time.    1/2/2025 (HENRIK Bingham): He was last seen in GI clinic in 2022. Labs and US were ordered but not done. At the time he reported drinking an occasional glass of wine and he was instructed to refrain from EtOH and avoid NSAIDs. He is here for c/o significant abdominal distention. Onset few months ago. NO pain. No jaundice or itching. BMs daily and regular. No bloody or black stools. No upper GI complaints like reflux or N/V. Appetite is very good. He reports about 20 lb weight gain since. Only new med was for overactive bladder which he stopped recently due to other side effects. One etoh drink a month. Mild edema in bilateral LE which is not new and actually better since he started low salt diet. Takes 400mg Ibuprofen 1-2 times/week. Denies herbal supplements. Family member reports he has memory issues dating back to 2021 and living in [facility]. Plan:  Labs including CBC, CMP, PT/INR, AFP, iron panel, US of abdomen.  RTC in 3 months.  2/6/2025:  Monica: The pt is an 79 yo M with alcoholic liver disease who presents for f/u.  Seen last month by my NP with labs ordered. He had complained of significant abdominal distention and pruritus when seen in clinic last month.  20 lb wt gain.  One alcoholic beverage a month endorsed.  Mild BLE edema noted but improved since pt was on low sodium diet.  US showed fatty liver without eloisa cirrhosis.  No ascites reported.  No liver lesions.  Iron saturation was 49%.  AFP was unremarkable.  Hb was borderline low at 13.1.  Other labs were normal. Patient is here with his family member.  Patient is minimally verbal.  Per family member with him, this is normal more recently.  However he is alert and oriented.  Paying attention.  Per her, she has been having significant urine output and "peeing like a horse".  He has lost 8 pounds approximately in the last month.  No changes in medications.  Has not needed to use this.  We discussed findings from the ultrasound.  We also discussed diet particularly adequate protein intake to help with minimizing third spacing of fluids.  She also mentioned that she had been moderating and monitoring his sodium intake. Plan:  3 month f/u with Ms. Carmen

## 2025-04-17 NOTE — HPI-TODAY'S VISIT:
4/17/2025:  Monica: 81 yo M with alcoholic liver disease/hemochromatosis present s for 3 month f/u. Pt has been having diarrhea since the last visit and he has been anemic.  His last colonoscopy was about 10 years ago and no polyps were found.  He also had a normal cologuard about 5 years ago.  Has lost 29-30 lbs in 1 year.  Has been started on welchol due to having his CCY.  No overt bleeding.  Has not been doing lactulose due to the diarreha. Wife said they are not sure how aggressive they want to be with treatment.

## 2025-04-18 LAB
ABSOLUTE BASOPHILS: 31
ABSOLUTE EOSINOPHILS: 366
ABSOLUTE LYMPHOCYTES: 1364
ABSOLUTE MONOCYTES: 397
ABSOLUTE NEUTROPHILS: 4042
BASOPHILS: 0.5
EOSINOPHILS: 5.9
FERRITIN, SERUM: 305
HEMATOCRIT: 41.8
HEMOGLOBIN: 14
IRON BIND.CAP.(TIBC): 235
IRON SATURATION: 53
IRON: 124
LYMPHOCYTES: 22
MCH: 32.7
MCHC: 33.5
MCV: 97.7
MONOCYTES: 6.4
MPV: 10.9
NEUTROPHILS: 65.2
PLATELET COUNT: 212
RDW: 14.5
RED BLOOD CELL COUNT: 4.28
TRANSFERRIN: 185
WHITE BLOOD CELL COUNT: 6.2

## 2025-04-22 ENCOUNTER — TELEPHONE ENCOUNTER (OUTPATIENT)
Dept: URBAN - METROPOLITAN AREA CLINIC 19 | Facility: CLINIC | Age: 80
End: 2025-04-22

## 2025-05-05 ENCOUNTER — TELEPHONE ENCOUNTER (OUTPATIENT)
Dept: URBAN - METROPOLITAN AREA CLINIC 19 | Facility: CLINIC | Age: 80
End: 2025-05-05

## 2025-05-05 PROBLEM — 47367009: Status: ACTIVE | Noted: 2025-05-05

## 2025-05-05 RX ORDER — PANCRELIPASE 36000; 180000; 114000 [USP'U]/1; [USP'U]/1; [USP'U]/1
2 TABLETS WITH MEALS, AND 1 TABLET WITH SNACKS CAPSULE, DELAYED RELEASE PELLETS ORAL
Qty: 900 | Refills: 11 | OUTPATIENT
Start: 2025-05-05

## 2025-05-06 ENCOUNTER — TELEPHONE ENCOUNTER (OUTPATIENT)
Dept: URBAN - METROPOLITAN AREA CLINIC 19 | Facility: CLINIC | Age: 80
End: 2025-05-06

## 2025-05-12 ENCOUNTER — OFFICE VISIT (OUTPATIENT)
Dept: URBAN - METROPOLITAN AREA CLINIC 19 | Facility: CLINIC | Age: 80
End: 2025-05-12

## 2025-06-18 NOTE — HPI-TODAY'S VISIT:
Hospitalist Progress Note      PCP: Darian Michelle MD    Date of Admission: 6/15/2025    Chief Complaint: weakness    Subjective: patient awake/alert     Medications:  Reviewed    Infusion Medications    dextrose      sodium chloride       Scheduled Medications    senna  1 tablet Oral Nightly    insulin lispro  0-8 Units SubCUTAneous 4x Daily AC & HS    pantoprazole  40 mg Oral QAM AC    amantadine  100 mg Oral BID    enoxaparin  40 mg SubCUTAneous Daily    baclofen  10 mg Oral 4x Daily    clonazePAM  0.5 mg Oral Q12H    escitalopram  20 mg Oral Daily    gabapentin  600 mg Oral TID    Vitamin D  2,000 Units Oral Daily    nicotine  1 patch TransDERmal Daily    cephALEXin  500 mg Oral 3 times per day     PRN Meds: HYDROcodone 5 mg - acetaminophen, glucose, dextrose bolus **OR** dextrose bolus, glucagon (rDNA), dextrose, sodium chloride flush, sodium chloride, ondansetron **OR** ondansetron, polyethylene glycol, acetaminophen **OR** [DISCONTINUED] acetaminophen, LORazepam, bisacodyl, sodium phosphate      Intake/Output Summary (Last 24 hours) at 6/18/2025 1459  Last data filed at 6/18/2025 0532  Gross per 24 hour   Intake 670 ml   Output --   Net 670 ml       Exam:    /77   Pulse 63   Temp 97.7 °F (36.5 °C) (Oral)   Resp 17   Ht 1.499 m (4' 11\")   Wt 86.1 kg (189 lb 14.4 oz)   SpO2 95%   BMI 38.36 kg/m²     General appearance: No apparent distress, appears stated age and cooperative.  HEENT: Pupils equal, round, and reactive to light. Conjunctivae/corneas clear.  Neck: Supple, with full range of motion. No jugular venous distention. Trachea midline.  Respiratory:  Normal respiratory effort. Clear to auscultation, bilaterally without Rales/Wheezes/Rhonchi.  Cardiovascular: Regular rate and rhythm with normal S1/S2 without murmurs, rubs or gallops.  Abdomen: Soft, non-tender, non-distended with normal bowel sounds.  Musculoskeletal: No clubbing, cyanosis or edema bilaterally.  Full range of motion  2/6/2025:  Monica: The pt is an 79 yo M with alcoholic liver disease who presents for f/u.  Seen last month by my NP with labs ordered. He had complained of significant abdominal distention and pruritus when seen in clinic last month.  20 lb wt gain.  One alcoholic beverage a month endorsed.  Mild BLE edema noted but improved since pt was on low sodium diet.  US showed fatty liver without eloisa cirrhosis.  No ascites reported.  No liver lesions.  Iron saturation was 49%.  AFP was unremarkable.  Hb was borderline low at 13.1.  Other labs were normal. Patient is here with his family member.  Patient is minimally verbal.  Per family member with him, this is normal more recently.  However he is alert and oriented.  Paying attention.  Per her, she has been having significant urine output and "peeing like a horse".  He has lost 8 pounds approximately in the last month.  No changes in medications.  Has not needed to use this.  We discussed findings from the ultrasound.  We also discussed diet particularly adequate protein intake to help with minimizing third spacing of fluids.  She also mentioned that she had been moderating and monitoring his sodium intake.

## (undated) DEVICE — SUCTION TIP YANKAUER W/O VENT K86

## (undated) DEVICE — SU STRATAFIX PDS PLUS 0 CT-1 18" SXPP1A401

## (undated) DEVICE — CATH TRAY FOLEY COUDE SURESTEP 16FR W/DRN BAG LATEX A304416A

## (undated) DEVICE — BLADE CLIPPER SGL USE 9680

## (undated) DEVICE — LINEN POUCH DBL 5427

## (undated) DEVICE — DRSG TEGADERM 4X4 3/4" 1626W

## (undated) DEVICE — DRSG AQUACEL AG HYDROFIBER 3.5X6" 422604

## (undated) DEVICE — DRAPE X-RAY TUBE 00-901169-01-OEC

## (undated) DEVICE — DRAPE STERI TOWEL LG 1010

## (undated) DEVICE — SUCTION FRAZIER 12FR W/OBTURATOR 33120

## (undated) DEVICE — PREP DURAPREP 26ML APL 8630

## (undated) DEVICE — SU VICRYL 2-0 CT-1 27" UND J259H

## (undated) DEVICE — NDL BLUNT 18GA 1" W/O FILTER 305181

## (undated) DEVICE — DRAPE COVER C-ARM SEAMLESS SNAP-KAP 03-KP26 LATEX FREE

## (undated) DEVICE — SYR BULB IRRIG DOVER 60 ML LATEX FREE 67000

## (undated) DEVICE — CUSHION INSERT LG PRONE VIEW JACKSON TABLE

## (undated) DEVICE — LINEN ORTHO ACL PACK 5447

## (undated) DEVICE — GLOVE PROTEXIS W/NEU-THERA 8.0  2D73TE80

## (undated) DEVICE — ESU GROUND PAD ADULT W/CORD E7507

## (undated) DEVICE — SU ETHILON 2-0 PS 18" 585H

## (undated) DEVICE — DRAIN HEMOVAC RESERVOIR KIT 10FR 1/8" MED 00-2550-002-10

## (undated) DEVICE — TOOL DISSECT MIDAS MR8 14CM MATCH HEAD 3MM MR8-14MH30

## (undated) DEVICE — ADH SKIN CLOSURE PREMIERPRO EXOFIN 1.0ML 3470

## (undated) DEVICE — SUCTION MANIFOLD NEPTUNE 2 SYS 4 PORT 0702-020-000

## (undated) DEVICE — DEVICE DUST COLLECTOR BONE BOX S-3500

## (undated) DEVICE — GOWN IMPERVIOUS SPECIALTY XLG/XLONG 32474

## (undated) DEVICE — MARKER SPHERES PASSIVE MEDT PACK 5 8801075

## (undated) DEVICE — LINEN DRAPE 54X72" 5467

## (undated) DEVICE — SOL NACL 0.9% IRRIG 1000ML BOTTLE 2F7124

## (undated) DEVICE — PAD PROAXIS TABLE KIT SPK10182

## (undated) DEVICE — SYR 10ML LL W/O NDL 302995

## (undated) DEVICE — SU MONOCRYL 3-0 PS-2 27" Y427H

## (undated) DEVICE — ESU PENCIL SMOKE EVAC W/ROCKER SWITCH 0703-047-000

## (undated) DEVICE — GLOVE PROTEXIS BLUE W/NEU-THERA 8.5  2D73EB85

## (undated) DEVICE — PACK SET-UP STD 9102

## (undated) DEVICE — MARKER SPHERES PASSIVE MEDT PACK 1 8801071

## (undated) DEVICE — SPONGE COTTONOID 1/2X1" 80-1402

## (undated) DEVICE — ESU ELEC BLADE 2.75" COATED/INSULATED E1455

## (undated) DEVICE — SPONGE RAY-TEC 4X8" 7318

## (undated) DEVICE — CATH IV ANGIO INTRO 12GA 382277

## (undated) DEVICE — PACK SMALL SPINE RIDGES

## (undated) DEVICE — SPONGE SURGIFOAM 01GM POWDER 1978

## (undated) DEVICE — DRAPE LAP W/ARMBOARD 29410

## (undated) RX ORDER — TRANEXAMIC ACID 10 MG/ML
INJECTION, SOLUTION INTRAVENOUS
Status: DISPENSED
Start: 2021-09-23

## (undated) RX ORDER — BUPIVACAINE HYDROCHLORIDE AND EPINEPHRINE 2.5; 5 MG/ML; UG/ML
INJECTION, SOLUTION EPIDURAL; INFILTRATION; INTRACAUDAL; PERINEURAL
Status: DISPENSED
Start: 2021-09-23

## (undated) RX ORDER — GABAPENTIN 100 MG/1
CAPSULE ORAL
Status: DISPENSED
Start: 2021-09-23

## (undated) RX ORDER — ONDANSETRON 2 MG/ML
INJECTION INTRAMUSCULAR; INTRAVENOUS
Status: DISPENSED
Start: 2021-09-23

## (undated) RX ORDER — ACETAMINOPHEN 325 MG/1
TABLET ORAL
Status: DISPENSED
Start: 2021-09-23

## (undated) RX ORDER — PROPOFOL 10 MG/ML
INJECTION, EMULSION INTRAVENOUS
Status: DISPENSED
Start: 2021-09-23

## (undated) RX ORDER — CEFAZOLIN SODIUM/WATER 2 G/20 ML
SYRINGE (ML) INTRAVENOUS
Status: DISPENSED
Start: 2021-09-23

## (undated) RX ORDER — LIDOCAINE HYDROCHLORIDE 10 MG/ML
INJECTION, SOLUTION EPIDURAL; INFILTRATION; INTRACAUDAL; PERINEURAL
Status: DISPENSED
Start: 2021-09-23

## (undated) RX ORDER — FENTANYL CITRATE 50 UG/ML
INJECTION, SOLUTION INTRAMUSCULAR; INTRAVENOUS
Status: DISPENSED
Start: 2021-09-23

## (undated) RX ORDER — GLYCOPYRROLATE 0.2 MG/ML
INJECTION INTRAMUSCULAR; INTRAVENOUS
Status: DISPENSED
Start: 2021-09-23

## (undated) RX ORDER — CEFAZOLIN SODIUM 1 G/3ML
INJECTION, POWDER, FOR SOLUTION INTRAMUSCULAR; INTRAVENOUS
Status: DISPENSED
Start: 2021-09-23

## (undated) RX ORDER — DEXAMETHASONE SODIUM PHOSPHATE 4 MG/ML
INJECTION, SOLUTION INTRA-ARTICULAR; INTRALESIONAL; INTRAMUSCULAR; INTRAVENOUS; SOFT TISSUE
Status: DISPENSED
Start: 2021-09-23

## (undated) RX ORDER — LABETALOL HYDROCHLORIDE 5 MG/ML
INJECTION, SOLUTION INTRAVENOUS
Status: DISPENSED
Start: 2021-09-23

## (undated) RX ORDER — NEOSTIGMINE METHYLSULFATE 1 MG/ML
VIAL (ML) INJECTION
Status: DISPENSED
Start: 2021-09-23